# Patient Record
Sex: FEMALE | Race: BLACK OR AFRICAN AMERICAN | NOT HISPANIC OR LATINO | Employment: OTHER | URBAN - METROPOLITAN AREA
[De-identification: names, ages, dates, MRNs, and addresses within clinical notes are randomized per-mention and may not be internally consistent; named-entity substitution may affect disease eponyms.]

---

## 2021-09-28 ENCOUNTER — HOSPITAL ENCOUNTER (EMERGENCY)
Facility: CLINIC | Age: 81
Discharge: HOME | End: 2021-09-28
Attending: EMERGENCY MEDICINE | Admitting: EMERGENCY MEDICINE
Payer: COMMERCIAL

## 2021-09-28 ENCOUNTER — APPOINTMENT (OUTPATIENT)
Dept: CT IMAGING | Facility: CLINIC | Age: 81
End: 2021-09-28
Attending: EMERGENCY MEDICINE
Payer: COMMERCIAL

## 2021-09-28 ENCOUNTER — APPOINTMENT (OUTPATIENT)
Dept: GENERAL RADIOLOGY | Facility: CLINIC | Age: 81
End: 2021-09-28
Attending: EMERGENCY MEDICINE
Payer: COMMERCIAL

## 2021-09-28 VITALS
DIASTOLIC BLOOD PRESSURE: 92 MMHG | OXYGEN SATURATION: 98 % | RESPIRATION RATE: 18 BRPM | HEART RATE: 90 BPM | TEMPERATURE: 97.9 F | SYSTOLIC BLOOD PRESSURE: 120 MMHG

## 2021-09-28 DIAGNOSIS — U07.1 INFECTION DUE TO 2019 NOVEL CORONAVIRUS: ICD-10-CM

## 2021-09-28 LAB
ALBUMIN SERPL-MCNC: 3.4 G/DL (ref 3.4–5)
ALBUMIN UR-MCNC: 100 MG/DL
ALP SERPL-CCNC: 157 U/L (ref 40–150)
ALT SERPL W P-5'-P-CCNC: 13 U/L (ref 0–50)
ANION GAP SERPL CALCULATED.3IONS-SCNC: 9 MMOL/L (ref 3–14)
APPEARANCE UR: ABNORMAL
AST SERPL W P-5'-P-CCNC: 29 U/L (ref 0–45)
BASOPHILS # BLD MANUAL: 0 10E3/UL (ref 0–0.2)
BASOPHILS NFR BLD MANUAL: 0 %
BILIRUB SERPL-MCNC: 1 MG/DL (ref 0.2–1.3)
BILIRUB UR QL STRIP: ABNORMAL
BUN SERPL-MCNC: 42 MG/DL (ref 7–30)
CALCIUM SERPL-MCNC: 9.4 MG/DL (ref 8.5–10.1)
CHLORIDE BLD-SCNC: 106 MMOL/L (ref 94–109)
CO2 SERPL-SCNC: 23 MMOL/L (ref 20–32)
COLOR UR AUTO: YELLOW
CREAT SERPL-MCNC: 1.13 MG/DL (ref 0.52–1.04)
EOSINOPHIL # BLD MANUAL: 0 10E3/UL (ref 0–0.7)
EOSINOPHIL NFR BLD MANUAL: 0 %
ERYTHROCYTE [DISTWIDTH] IN BLOOD BY AUTOMATED COUNT: 15 % (ref 10–15)
GFR SERPL CREATININE-BSD FRML MDRD: 46 ML/MIN/1.73M2
GLUCOSE BLD-MCNC: 104 MG/DL (ref 70–99)
GLUCOSE UR STRIP-MCNC: NEGATIVE MG/DL
HCT VFR BLD AUTO: 51.2 % (ref 35–47)
HGB BLD-MCNC: 16.7 G/DL (ref 11.7–15.7)
HGB UR QL STRIP: NEGATIVE
HYALINE CASTS: 4 /LPF
KETONES UR STRIP-MCNC: 10 MG/DL
LEUKOCYTE ESTERASE UR QL STRIP: ABNORMAL
LIPASE SERPL-CCNC: 139 U/L (ref 73–393)
LYMPHOCYTES # BLD MANUAL: 0.8 10E3/UL (ref 0.8–5.3)
LYMPHOCYTES NFR BLD MANUAL: 18 %
MCH RBC QN AUTO: 29.6 PG (ref 26.5–33)
MCHC RBC AUTO-ENTMCNC: 32.6 G/DL (ref 31.5–36.5)
MCV RBC AUTO: 91 FL (ref 78–100)
MONOCYTES # BLD MANUAL: 0.4 10E3/UL (ref 0–1.3)
MONOCYTES NFR BLD MANUAL: 9 %
MUCOUS THREADS #/AREA URNS LPF: PRESENT /LPF
NEUTROPHILS # BLD MANUAL: 3.3 10E3/UL (ref 1.6–8.3)
NEUTROPHILS NFR BLD MANUAL: 73 %
NITRATE UR QL: NEGATIVE
NRBC # BLD AUTO: 0.1 10E3/UL
NRBC BLD MANUAL-RTO: 2 %
PH UR STRIP: 5.5 [PH] (ref 5–7)
PLAT MORPH BLD: ABNORMAL
PLATELET # BLD AUTO: 343 10E3/UL (ref 150–450)
POTASSIUM BLD-SCNC: 4.3 MMOL/L (ref 3.4–5.3)
PROT SERPL-MCNC: 8.6 G/DL (ref 6.8–8.8)
RBC # BLD AUTO: 5.65 10E6/UL (ref 3.8–5.2)
RBC MORPH BLD: ABNORMAL
RBC URINE: 4 /HPF
SARS-COV-2 RNA RESP QL NAA+PROBE: POSITIVE
SODIUM SERPL-SCNC: 138 MMOL/L (ref 133–144)
SP GR UR STRIP: 1.03 (ref 1–1.03)
SQUAMOUS EPITHELIAL: 3 /HPF
UROBILINOGEN UR STRIP-MCNC: 4 MG/DL
WBC # BLD AUTO: 4.5 10E3/UL (ref 4–11)
WBC URINE: 4 /HPF

## 2021-09-28 PROCEDURE — 82040 ASSAY OF SERUM ALBUMIN: CPT | Performed by: EMERGENCY MEDICINE

## 2021-09-28 PROCEDURE — 81003 URINALYSIS AUTO W/O SCOPE: CPT | Performed by: EMERGENCY MEDICINE

## 2021-09-28 PROCEDURE — 36415 COLL VENOUS BLD VENIPUNCTURE: CPT | Performed by: EMERGENCY MEDICINE

## 2021-09-28 PROCEDURE — 258N000003 HC RX IP 258 OP 636: Performed by: EMERGENCY MEDICINE

## 2021-09-28 PROCEDURE — 250N000011 HC RX IP 250 OP 636: Performed by: EMERGENCY MEDICINE

## 2021-09-28 PROCEDURE — C9803 HOPD COVID-19 SPEC COLLECT: HCPCS | Performed by: EMERGENCY MEDICINE

## 2021-09-28 PROCEDURE — 71046 X-RAY EXAM CHEST 2 VIEWS: CPT

## 2021-09-28 PROCEDURE — U0003 INFECTIOUS AGENT DETECTION BY NUCLEIC ACID (DNA OR RNA); SEVERE ACUTE RESPIRATORY SYNDROME CORONAVIRUS 2 (SARS-COV-2) (CORONAVIRUS DISEASE [COVID-19]), AMPLIFIED PROBE TECHNIQUE, MAKING USE OF HIGH THROUGHPUT TECHNOLOGIES AS DESCRIBED BY CMS-2020-01-R: HCPCS | Performed by: EMERGENCY MEDICINE

## 2021-09-28 PROCEDURE — 83690 ASSAY OF LIPASE: CPT | Performed by: EMERGENCY MEDICINE

## 2021-09-28 PROCEDURE — 96360 HYDRATION IV INFUSION INIT: CPT | Mod: 59 | Performed by: EMERGENCY MEDICINE

## 2021-09-28 PROCEDURE — 93005 ELECTROCARDIOGRAM TRACING: CPT | Performed by: EMERGENCY MEDICINE

## 2021-09-28 PROCEDURE — 99285 EMERGENCY DEPT VISIT HI MDM: CPT | Mod: 25 | Performed by: EMERGENCY MEDICINE

## 2021-09-28 PROCEDURE — 99284 EMERGENCY DEPT VISIT MOD MDM: CPT | Performed by: EMERGENCY MEDICINE

## 2021-09-28 PROCEDURE — 85027 COMPLETE CBC AUTOMATED: CPT | Performed by: EMERGENCY MEDICINE

## 2021-09-28 PROCEDURE — 74177 CT ABD & PELVIS W/CONTRAST: CPT

## 2021-09-28 RX ORDER — IOPAMIDOL 755 MG/ML
100 INJECTION, SOLUTION INTRAVASCULAR ONCE
Status: COMPLETED | OUTPATIENT
Start: 2021-09-28 | End: 2021-09-28

## 2021-09-28 RX ORDER — SODIUM CHLORIDE 9 MG/ML
100 INJECTION, SOLUTION INTRAVENOUS ONCE
Status: COMPLETED | OUTPATIENT
Start: 2021-09-28 | End: 2021-09-28

## 2021-09-28 RX ADMIN — IOPAMIDOL 135 ML: 755 INJECTION, SOLUTION INTRAVENOUS at 19:08

## 2021-09-28 RX ADMIN — SODIUM CHLORIDE 74 ML: 9 INJECTION, SOLUTION INTRAVENOUS at 19:08

## 2021-09-28 RX ADMIN — SODIUM CHLORIDE 1000 ML: 9 INJECTION, SOLUTION INTRAVENOUS at 18:10

## 2021-09-28 NOTE — ED PROVIDER NOTES
ED Provider Note  [facility]      History     Chief Complaint   Patient presents with     Cough     HPI  Cara Cool is a 81 year old female with a history of autoimmune hepatitis, paroxysmal atrial tachycardia, hypertension, hypercholesterolemia, IgM monoclonal gammopathy, TIA,  who presents for evaluation of Cough    Reports she has been feeling unwell for the past week, which started shortly after her niece came to stay with her.  She reports feeling fatigued, decreased oral intake, dry heaving that started last night.  No fevers that she is aware of but she has felt chilled intermittently.  She has a productive cough which she reports is chronic and has not changed in severity, volume of sputum or sputum characteristics.  No chest pain or shortness of breath.  She denies abdominal pain, dysuria, urinary frequency.  She said that she has had decreased stooling but has not had straining or constipation. No lightheadedness, dizziness, syncope/near syncope.    Vaccinated against COVID-19 x2 (last dose was 9/3)    Past Medical and Surgical History, Medications, Allergies, and Social History were reviewed in the electronic medical record. Review with the patient was attempted but limited due to patient unable to recall.       Review of Systems  A complete review of systems was performed with pertinent positives and negatives noted in the HPI, and all other systems negative.    Physical Exam   BP: 107/75  Pulse: 90  Temp: 98.1  F (36.7  C)  Resp: 16  SpO2: 100 %  Physical Exam  Vitals and nursing note reviewed.   Constitutional:       General: She is not in acute distress.  HENT:      Head: Normocephalic and atraumatic.      Right Ear: External ear normal.      Left Ear: External ear normal.   Eyes:      Conjunctiva/sclera: Conjunctivae normal.   Cardiovascular:      Rate and Rhythm: Normal rate and regular rhythm.   Pulmonary:      Effort: Pulmonary effort is normal. No respiratory  distress.      Breath sounds: Normal breath sounds. No stridor. No wheezing, rhonchi or rales.   Abdominal:      General: Abdomen is flat.      Tenderness: There is abdominal tenderness (LLQ ). There is no guarding or rebound.   Musculoskeletal:         General: No deformity.   Skin:     General: Skin is warm and dry.      Capillary Refill: Capillary refill takes less than 2 seconds.   Neurological:      General: No focal deficit present.      Mental Status: She is alert.   Psychiatric:         Mood and Affect: Mood normal.       Diagnostics/Procedures      Results for orders placed or performed during the hospital encounter of 09/28/21   Chest XR,  PA & LAT     Status: None    Narrative    EXAM: XR CHEST 2 VW  LOCATION: [facility]  DATE/TIME: 9/28/2021 7:04 PM    INDICATION: cough  COMPARISON: None.      Impression    IMPRESSION: A small indistinct opacity in the right upper lung may represent a developing pneumonia. Recommend a follow-up chest radiograph after therapy to document resolution.    Lungs hyperinflated. Mild diffuse airway wall thickening. No pleural effusion. The cardiac silhouette and pulmonary vasculature are normal.    NOTE: ABNORMAL REPORT    THE DICTATION ABOVE DESCRIBES AN ABNORMALITY FOR WHICH FOLLOW-UP IS NEEDED.    CT Abdomen Pelvis w Contrast     Status: None    Narrative    EXAM: CT ABDOMEN PELVIS W CONTRAST  LOCATION: [facility]  DATE/TIME: 9/28/2021 6:33 PM    INDICATION: Left lower quadrant pain and fever.  COMPARISON: None.  TECHNIQUE: CT scan of the abdomen and pelvis was performed following injection of IV contrast. Multiplanar reformats were obtained. Dose reduction techniques were used.  CONTRAST: 135 mL Isovue 370    FINDINGS:   LOWER CHEST: Mild fibrotic changes at both lung bases. The visualized lung bases are otherwise clear.    HEPATOBILIARY: Probable small stones or sludge within the  gallbladder. Gallbladder is otherwise unremarkable. No hepatic masses are seen.    PANCREAS: Normal.    SPLEEN: Unremarkable..    ADRENAL GLANDS: Normal.    KIDNEYS/BLADDER: There is mild cortical scarring in the interpolar region of the right kidney anteriorly. No significant mass, stones, or hydronephrosis. There are simple or benign cysts. No follow up is needed.    BOWEL: Scattered colonic diverticulosis. No obstruction or inflammatory change.    LYMPH NODES: No enlarged lymph nodes in the abdomen or pelvis.    VASCULATURE: Moderate atherosclerotic aortoiliac calcification.    PELVIC ORGANS: The uterus is not seen, and is likely surgically absent. Multiple images through the pelvis are degraded by artifact related to a left hip arthroplasty.    MUSCULOSKELETAL: Degenerative changes are noted in the visualized thoracolumbar spine.      Impression    IMPRESSION:   1.  No acute abnormality in the abdomen or pelvis. No cause for left lower quadrant pain is identified.  2.  Colonic diverticulosis, without convincing evidence for diverticulitis.  3.  Small amount of sludge and/or stones within the gallbladder.   Symptomatic COVID-19 Virus (Coronavirus) by PCR Nasopharyngeal     Status: Abnormal    Specimen: Nasopharyngeal; Swab   Result Value Ref Range    SARS CoV2 PCR Positive (A) Negative    Narrative    Testing was performed using the Xpert Xpress SARS-CoV-2 Assay on the  Cepheid Gene-Xpert Instrument Systems. Additional information about  this Emergency Use Authorization (EUA) assay can be found via the Lab  Guide. This test should be ordered for the detection of SARS-CoV-2 in  individuals who meet SARS-CoV-2 clinical and/or epidemiological  criteria. Test performance is unknown in asymptomatic patients. This  test is for in vitro diagnostic use under the FDA EUA for  laboratories certified under CLIA to perform high complexity testing.  This test has not been FDA cleared or approved. A negative result  does not  rule out the presence of PCR inhibitors in the specimen or  target RNA in concentration below the limit of detection for the  assay. The possibility of a false negative should be considered if  the patient's recent exposure or clinical presentation suggests  COVID-19. This test was validated by the [facility]. This laboratory is certified under  the Clinical Laboratory Improvement Amendments of 1988 (CLIA-88) as  qualified to perform high complexity laboratory testing.     Comprehensive metabolic panel     Status: Abnormal   Result Value Ref Range    Sodium 138 133 - 144 mmol/L    Potassium 4.3 3.4 - 5.3 mmol/L    Chloride 106 94 - 109 mmol/L    Carbon Dioxide (CO2) 23 20 - 32 mmol/L    Anion Gap 9 3 - 14 mmol/L    Urea Nitrogen 42 (H) 7 - 30 mg/dL    Creatinine 1.13 (H) 0.52 - 1.04 mg/dL    Calcium 9.4 8.5 - 10.1 mg/dL    Glucose 104 (H) 70 - 99 mg/dL    Alkaline Phosphatase 157 (H) 40 - 150 U/L    AST 29 0 - 45 U/L    ALT 13 0 - 50 U/L    Protein Total 8.6 6.8 - 8.8 g/dL    Albumin 3.4 3.4 - 5.0 g/dL    Bilirubin Total 1.0 0.2 - 1.3 mg/dL    GFR Estimate 46 (L) >60 mL/min/1.73m2   Lipase     Status: Normal   Result Value Ref Range    Lipase 139 73 - 393 U/L   UA with Microscopic reflex to Culture     Status: Abnormal    Specimen: Urine, NOS   Result Value Ref Range    Color Urine Yellow Colorless, Straw, Light Yellow, Yellow    Appearance Urine Slightly Cloudy (A) Clear    Glucose Urine Negative Negative mg/dL    Bilirubin Urine Small (A) Negative    Ketones Urine 10  (A) Negative mg/dL    Specific Gravity Urine 1.030 1.003 - 1.035    Blood Urine Negative Negative    pH Urine 5.5 5.0 - 7.0    Protein Albumin Urine 100  (A) Negative mg/dL    Urobilinogen Urine 4.0 (A) Normal, 2.0 mg/dL    Nitrite Urine Negative Negative    Leukocyte Esterase Urine Small (A) Negative    Mucus Urine Present (A) None Seen /LPF    RBC Urine 4 (H) <=2 /HPF    WBC Urine 4 <=5 /HPF     Squamous Epithelials Urine 3 (H) <=1 /HPF    Hyaline Casts Urine 4 (H) <=2 /LPF    Narrative    Urine Culture not indicated   CBC with platelets and differential     Status: Abnormal   Result Value Ref Range    WBC Count 4.5 4.0 - 11.0 10e3/uL    RBC Count 5.65 (H) 3.80 - 5.20 10e6/uL    Hemoglobin 16.7 (H) 11.7 - 15.7 g/dL    Hematocrit 51.2 (H) 35.0 - 47.0 %    MCV 91 78 - 100 fL    MCH 29.6 26.5 - 33.0 pg    MCHC 32.6 31.5 - 36.5 g/dL    RDW 15.0 10.0 - 15.0 %    Platelet Count 343 150 - 450 10e3/uL   Manual Differential     Status: Abnormal   Result Value Ref Range    % Neutrophils 73 %    % Lymphocytes 18 %    % Monocytes 9 %    % Eosinophils 0 %    % Basophils 0 %    NRBCs per 100 WBC 2 (H) <=0 %    Absolute Neutrophils 3.3 1.6 - 8.3 10e3/uL    Absolute Lymphocytes 0.8 0.8 - 5.3 10e3/uL    Absolute Monocytes 0.4 0.0 - 1.3 10e3/uL    Absolute Eosinophils 0.0 0.0 - 0.7 10e3/uL    Absolute Basophils 0.0 0.0 - 0.2 10e3/uL    Absolute NRBCs 0.1 (H) <=0.0 10e3/uL    RBC Morphology Confirmed RBC Indices     Platelet Assessment  Automated Count Confirmed. Platelet morphology is normal.     Automated Count Confirmed. Platelet morphology is normal.   EKG 12 lead     Status: None (Preliminary result)   Result Value Ref Range    Systolic Blood Pressure  mmHg    Diastolic Blood Pressure  mmHg    Ventricular Rate 86 BPM    Atrial Rate 86 BPM    VA Interval 136 ms    QRS Duration 66 ms     ms    QTc 459 ms    P Axis -5 degrees    R AXIS 12 degrees    T Axis 70 degrees    Interpretation ECG       Sinus rhythm  Nonspecific T wave abnormality  Abnormal ECG     CBC with platelets differential     Status: Abnormal    Narrative    The following orders were created for panel order CBC with platelets differential.  Procedure                               Abnormality         Status                     ---------                               -----------         ------                     CBC with platelets and d...[[id number]]   Abnormal            Final result               Manual Differential[[id number]]          Abnormal            Final result                 Please view results for these tests on the individual orders.     Medications   0.9% sodium chloride BOLUS (0 mLs Intravenous Stopped 9/28/21 1900)   sodium chloride 0.9% infusion (0 mLs Intravenous Stopped 9/28/21 1920)   iopamidol (ISOVUE-370) solution 100 mL (135 mLs Intravenous Given 9/28/21 1908)        Assessments & Plan (with Medical Decision Making)   Cara Cool is a 81 year old female presenting with generalized malaise, and poor PO intake x1 week. Vital signs are within normal limits. Patient's chief complaint is listed as cough; however, she reports this is a chronic productive cough that has not change in quality, frequency, severity or sputum characteristics.     Differential includes but is not limited to COVID-19 infection, electrolyte abnormality, bowel obstruction, diverticulitis among others    Plan - labs, ECG, CXR and CT imaging of the abdomen/pelvis. Disposition pending ED course.     I have reviewed the nursing notes. I have reviewed the findings, diagnosis, plan and need for follow up with the patient.    ED Course as of Sep 28 2204   Tue Sep 28, 2021   1734 Personally reviewed and interpreted ECG. Sinus rhythm with ventricular rate of 86bpm. Normal intervals. T wave inversions in V1, V2, T wave flattening diffusely. No other acute ischemic findings. No prior ECGs available for comparison.       1823 May be hemoconcentrated   CBC with platelets differential(!)   1832 Comprehensive metabolic panel(!)   1832 Lipase   2001 Symptomatic COVID-19 Virus (Coronavirus) by PCR Nasopharyngeal(!)   2001 1.  No acute abnormality in the abdomen or pelvis. No cause for left lower quadrant pain is identified.  2.  Colonic diverticulosis, without convincing evidence for diverticulitis.  3.  Small amount of sludge and/or stones within the gallbladder   CT Abdomen Pelvis w  Contrast   2036 A small indistinct opacity in the right upper lung may represent a developing pneumonia. Recommend a follow-up chest radiograph after therapy to document resolution.     Lungs hyperinflated. Mild diffuse airway wall thickening. No pleural effusion. The cardiac silhouette and pulmonary vasculature are normal.   Chest XR,  PA & LAT   2036 Indistinct opacity in right upper lung. Considered possible consolidative bacterial pneumonia but more likely secondary to active COVID-19 infection       2144 Small leukocyte esterase with minimal WBCs and squamous epithelial cells present. Likely contaminated.    UA with Microscopic reflex to Culture(!)   2156 Results reviewed with the patient. Patient feels comfortable with plan to discharge home. Discussed discharge instructions, return precautions and follow-up plans. Questions answered. Patient expressed understanding and agreement with the plan.  Patient was discharged home.            New Prescriptions    No medications on file       Final diagnoses:   Infection due to 2019 novel coronavirus          Maite Hill MD  09/28/21 [id number]

## 2021-09-28 NOTE — ED TRIAGE NOTES
Pt states her family member who live with her is sick and she is now having a cough and wants to be checked for COVID.

## 2021-09-29 LAB
ATRIAL RATE - MUSE: 86 BPM
DIASTOLIC BLOOD PRESSURE - MUSE: NORMAL MMHG
INTERPRETATION ECG - MUSE: NORMAL
P AXIS - MUSE: -5 DEGREES
PR INTERVAL - MUSE: 136 MS
QRS DURATION - MUSE: 66 MS
QT - MUSE: 384 MS
QTC - MUSE: 459 MS
R AXIS - MUSE: 12 DEGREES
SYSTOLIC BLOOD PRESSURE - MUSE: NORMAL MMHG
T AXIS - MUSE: 70 DEGREES
VENTRICULAR RATE- MUSE: 86 BPM

## 2021-09-29 NOTE — DISCHARGE INSTRUCTIONS
You were diagnosed with COVID-19 infection today. Stay home and self isolate. Drink plenty of fluids. All household/close contacts need to be tested for COVID. Return to the ED if you become lightheaded/dizzy, have chest pain or shortness of breath, or concerned for dehydration.

## 2024-07-17 ENCOUNTER — APPOINTMENT (OUTPATIENT)
Dept: CT IMAGING | Facility: CLINIC | Age: 84
DRG: 543 | End: 2024-07-17
Payer: COMMERCIAL

## 2024-07-17 ENCOUNTER — HOSPITAL ENCOUNTER (INPATIENT)
Facility: CLINIC | Age: 84
LOS: 7 days | Discharge: HOME HEALTH | DRG: 543 | End: 2024-07-25
Attending: INTERNAL MEDICINE | Admitting: FAMILY MEDICINE
Payer: COMMERCIAL

## 2024-07-17 DIAGNOSIS — N30.00 ACUTE CYSTITIS WITHOUT HEMATURIA: ICD-10-CM

## 2024-07-17 DIAGNOSIS — I47.19 ATRIAL TACHYCARDIA (H): Primary | ICD-10-CM

## 2024-07-17 DIAGNOSIS — I49.8 JUNCTIONAL RHYTHM: ICD-10-CM

## 2024-07-17 DIAGNOSIS — Z86.711 HX OF PULMONARY EMBOLUS: ICD-10-CM

## 2024-07-17 DIAGNOSIS — M54.9 BACK PAIN, UNSPECIFIED BACK LOCATION, UNSPECIFIED BACK PAIN LATERALITY, UNSPECIFIED CHRONICITY: ICD-10-CM

## 2024-07-17 DIAGNOSIS — S22.089A CLOSED FRACTURE OF ELEVENTH THORACIC VERTEBRA, UNSPECIFIED FRACTURE MORPHOLOGY, INITIAL ENCOUNTER (H): ICD-10-CM

## 2024-07-17 DIAGNOSIS — I48.0 PAROXYSMAL ATRIAL FIBRILLATION (H): ICD-10-CM

## 2024-07-17 DIAGNOSIS — M89.9 LYTIC BONE LESIONS ON XRAY: ICD-10-CM

## 2024-07-17 DIAGNOSIS — Z86.79 HISTORY OF PAROXYSMAL ATRIAL TACHYCARDIA: Chronic | ICD-10-CM

## 2024-07-17 PROBLEM — R57.9 SHOCK (H): Status: ACTIVE | Noted: 2021-10-08

## 2024-07-17 PROBLEM — M81.0 OSTEOPOROSIS: Status: ACTIVE | Noted: 2017-08-23

## 2024-07-17 PROBLEM — E21.3 HYPERPARATHYROIDISM (H): Status: ACTIVE | Noted: 2019-11-12

## 2024-07-17 PROBLEM — I26.02 ACUTE SADDLE PULMONARY EMBOLISM WITH ACUTE COR PULMONALE (H): Status: ACTIVE | Noted: 2021-10-08

## 2024-07-17 PROBLEM — K75.4 HEPATITIS, AUTOIMMUNE (H): Status: ACTIVE | Noted: 2017-11-13

## 2024-07-17 PROBLEM — E66.01 OBESITY, CLASS III, BMI 40-49.9 (MORBID OBESITY) (H): Status: ACTIVE | Noted: 2021-05-22

## 2024-07-17 PROBLEM — M89.8X9 LYTIC BONE LESIONS ON XRAY: Status: ACTIVE | Noted: 2024-07-17

## 2024-07-17 PROBLEM — N28.89 RENAL MASS: Status: ACTIVE | Noted: 2018-05-08

## 2024-07-17 PROBLEM — I47.10 SUPRAVENTRICULAR TACHYCARDIA (H): Status: ACTIVE | Noted: 2021-10-29

## 2024-07-17 PROBLEM — D47.2 IGM MONOCLONAL GAMMOPATHY OF UNCERTAIN SIGNIFICANCE: Status: ACTIVE | Noted: 2017-12-22

## 2024-07-17 PROBLEM — E66.813 OBESITY, CLASS III, BMI 40-49.9 (MORBID OBESITY) (H): Status: ACTIVE | Noted: 2021-05-22

## 2024-07-17 LAB
ALBUMIN SERPL BCG-MCNC: 4 G/DL (ref 3.5–5.2)
ALP SERPL-CCNC: 204 U/L (ref 40–150)
ALT SERPL W P-5'-P-CCNC: 6 U/L (ref 0–50)
ANION GAP SERPL CALCULATED.3IONS-SCNC: 16 MMOL/L (ref 7–15)
APTT PPP: 39 SECONDS (ref 22–38)
AST SERPL W P-5'-P-CCNC: 25 U/L (ref 0–45)
BASOPHILS # BLD AUTO: 0 10E3/UL (ref 0–0.2)
BASOPHILS NFR BLD AUTO: 0 %
BILIRUB SERPL-MCNC: 0.8 MG/DL
BUN SERPL-MCNC: 29.2 MG/DL (ref 8–23)
CALCIUM SERPL-MCNC: 10.5 MG/DL (ref 8.8–10.4)
CHLORIDE SERPL-SCNC: 98 MMOL/L (ref 98–107)
CREAT SERPL-MCNC: 0.86 MG/DL (ref 0.51–0.95)
EGFRCR SERPLBLD CKD-EPI 2021: 67 ML/MIN/1.73M2
EOSINOPHIL # BLD AUTO: 0 10E3/UL (ref 0–0.7)
EOSINOPHIL NFR BLD AUTO: 0 %
ERYTHROCYTE [DISTWIDTH] IN BLOOD BY AUTOMATED COUNT: 14.9 % (ref 10–15)
GLUCOSE SERPL-MCNC: 103 MG/DL (ref 70–99)
HCO3 SERPL-SCNC: 24 MMOL/L (ref 22–29)
HCT VFR BLD AUTO: 43.2 % (ref 35–47)
HGB BLD-MCNC: 14.4 G/DL (ref 11.7–15.7)
IMM GRANULOCYTES # BLD: 0 10E3/UL
IMM GRANULOCYTES NFR BLD: 1 %
INR PPP: 2.17 (ref 0.85–1.15)
LYMPHOCYTES # BLD AUTO: 0.9 10E3/UL (ref 0.8–5.3)
LYMPHOCYTES NFR BLD AUTO: 12 %
MAGNESIUM SERPL-MCNC: 1.7 MG/DL (ref 1.7–2.3)
MCH RBC QN AUTO: 29.9 PG (ref 26.5–33)
MCHC RBC AUTO-ENTMCNC: 33.3 G/DL (ref 31.5–36.5)
MCV RBC AUTO: 90 FL (ref 78–100)
MONOCYTES # BLD AUTO: 0.9 10E3/UL (ref 0–1.3)
MONOCYTES NFR BLD AUTO: 11 %
NEUTROPHILS # BLD AUTO: 5.7 10E3/UL (ref 1.6–8.3)
NEUTROPHILS NFR BLD AUTO: 76 %
NRBC # BLD AUTO: 0 10E3/UL
NRBC BLD AUTO-RTO: 0 /100
PLATELET # BLD AUTO: 434 10E3/UL (ref 150–450)
POTASSIUM SERPL-SCNC: 4.2 MMOL/L (ref 3.4–5.3)
PROT SERPL-MCNC: 8.4 G/DL (ref 6.4–8.3)
RBC # BLD AUTO: 4.82 10E6/UL (ref 3.8–5.2)
SODIUM SERPL-SCNC: 138 MMOL/L (ref 135–145)
TROPONIN T SERPL HS-MCNC: 26 NG/L
TROPONIN T SERPL HS-MCNC: 27 NG/L
WBC # BLD AUTO: 7.6 10E3/UL (ref 4–11)

## 2024-07-17 PROCEDURE — 83735 ASSAY OF MAGNESIUM: CPT

## 2024-07-17 PROCEDURE — 85025 COMPLETE CBC W/AUTO DIFF WBC: CPT

## 2024-07-17 PROCEDURE — 85610 PROTHROMBIN TIME: CPT

## 2024-07-17 PROCEDURE — 99285 EMERGENCY DEPT VISIT HI MDM: CPT | Mod: 25 | Performed by: INTERNAL MEDICINE

## 2024-07-17 PROCEDURE — 250N000011 HC RX IP 250 OP 636

## 2024-07-17 PROCEDURE — 85730 THROMBOPLASTIN TIME PARTIAL: CPT

## 2024-07-17 PROCEDURE — 96374 THER/PROPH/DIAG INJ IV PUSH: CPT

## 2024-07-17 PROCEDURE — 71260 CT THORAX DX C+: CPT

## 2024-07-17 PROCEDURE — 999N000104 CT THORACIC SPINE RECONSTRUCTED

## 2024-07-17 PROCEDURE — 99285 EMERGENCY DEPT VISIT HI MDM: CPT | Performed by: INTERNAL MEDICINE

## 2024-07-17 PROCEDURE — 80053 COMPREHEN METABOLIC PANEL: CPT

## 2024-07-17 PROCEDURE — 96361 HYDRATE IV INFUSION ADD-ON: CPT | Performed by: INTERNAL MEDICINE

## 2024-07-17 PROCEDURE — 250N000011 HC RX IP 250 OP 636: Performed by: INTERNAL MEDICINE

## 2024-07-17 PROCEDURE — 93010 ELECTROCARDIOGRAM REPORT: CPT

## 2024-07-17 PROCEDURE — 36415 COLL VENOUS BLD VENIPUNCTURE: CPT

## 2024-07-17 PROCEDURE — 82565 ASSAY OF CREATININE: CPT | Performed by: STUDENT IN AN ORGANIZED HEALTH CARE EDUCATION/TRAINING PROGRAM

## 2024-07-17 PROCEDURE — 96360 HYDRATION IV INFUSION INIT: CPT | Mod: 59 | Performed by: INTERNAL MEDICINE

## 2024-07-17 PROCEDURE — G0378 HOSPITAL OBSERVATION PER HR: HCPCS

## 2024-07-17 PROCEDURE — 93005 ELECTROCARDIOGRAM TRACING: CPT

## 2024-07-17 PROCEDURE — 258N000003 HC RX IP 258 OP 636

## 2024-07-17 PROCEDURE — 250N000009 HC RX 250: Performed by: INTERNAL MEDICINE

## 2024-07-17 PROCEDURE — 84484 ASSAY OF TROPONIN QUANT: CPT

## 2024-07-17 PROCEDURE — 250N000013 HC RX MED GY IP 250 OP 250 PS 637

## 2024-07-17 RX ORDER — HYDROCHLOROTHIAZIDE 50 MG/1
50 TABLET ORAL DAILY
COMMUNITY
Start: 2023-12-22

## 2024-07-17 RX ORDER — LIDOCAINE 40 MG/G
CREAM TOPICAL
Status: DISCONTINUED | OUTPATIENT
Start: 2024-07-17 | End: 2024-07-25 | Stop reason: HOSPADM

## 2024-07-17 RX ORDER — SPIRONOLACTONE 50 MG/1
50 TABLET, FILM COATED ORAL DAILY
Status: ON HOLD | COMMUNITY
Start: 2023-12-22 | End: 2024-07-18

## 2024-07-17 RX ORDER — ATORVASTATIN CALCIUM 20 MG/1
20 TABLET, FILM COATED ORAL DAILY
COMMUNITY
Start: 2023-12-22

## 2024-07-17 RX ORDER — OXYCODONE HYDROCHLORIDE 5 MG/1
5 TABLET ORAL EVERY 4 HOURS PRN
Status: DISCONTINUED | OUTPATIENT
Start: 2024-07-17 | End: 2024-07-18

## 2024-07-17 RX ORDER — OXYCODONE HYDROCHLORIDE 5 MG/1
5 TABLET ORAL ONCE
Status: COMPLETED | OUTPATIENT
Start: 2024-07-17 | End: 2024-07-17

## 2024-07-17 RX ORDER — DIGOXIN 125 MCG
125 TABLET ORAL DAILY
COMMUNITY
Start: 2023-12-22

## 2024-07-17 RX ORDER — AMOXICILLIN 250 MG
2 CAPSULE ORAL 2 TIMES DAILY PRN
Status: DISCONTINUED | OUTPATIENT
Start: 2024-07-17 | End: 2024-07-25 | Stop reason: HOSPADM

## 2024-07-17 RX ORDER — VITAMIN B COMPLEX
1000 TABLET ORAL DAILY
COMMUNITY

## 2024-07-17 RX ORDER — POLYETHYLENE GLYCOL 3350 17 G/17G
17 POWDER, FOR SOLUTION ORAL DAILY
Status: DISCONTINUED | OUTPATIENT
Start: 2024-07-18 | End: 2024-07-25 | Stop reason: HOSPADM

## 2024-07-17 RX ORDER — IOPAMIDOL 755 MG/ML
100 INJECTION, SOLUTION INTRAVASCULAR ONCE
Status: COMPLETED | OUTPATIENT
Start: 2024-07-17 | End: 2024-07-17

## 2024-07-17 RX ORDER — IOPAMIDOL 755 MG/ML
100 INJECTION, SOLUTION INTRAVASCULAR ONCE
Status: DISCONTINUED | OUTPATIENT
Start: 2024-07-17 | End: 2024-07-18

## 2024-07-17 RX ORDER — CALCIUM CARBONATE 500 MG/1
1000 TABLET, CHEWABLE ORAL 4 TIMES DAILY PRN
Status: DISCONTINUED | OUTPATIENT
Start: 2024-07-17 | End: 2024-07-25 | Stop reason: HOSPADM

## 2024-07-17 RX ORDER — ALENDRONATE SODIUM 70 MG/1
70 TABLET ORAL
COMMUNITY
Start: 2023-12-22

## 2024-07-17 RX ORDER — SPIRONOLACTONE 50 MG/1
50 TABLET, FILM COATED ORAL DAILY
COMMUNITY
Start: 2024-05-02

## 2024-07-17 RX ORDER — AMOXICILLIN 250 MG
1 CAPSULE ORAL 2 TIMES DAILY PRN
Status: DISCONTINUED | OUTPATIENT
Start: 2024-07-17 | End: 2024-07-25 | Stop reason: HOSPADM

## 2024-07-17 RX ORDER — ENOXAPARIN SODIUM 100 MG/ML
40 INJECTION SUBCUTANEOUS EVERY 24 HOURS
Status: DISCONTINUED | OUTPATIENT
Start: 2024-07-18 | End: 2024-07-18

## 2024-07-17 RX ORDER — HYDROMORPHONE HYDROCHLORIDE 1 MG/ML
0.5 INJECTION, SOLUTION INTRAMUSCULAR; INTRAVENOUS; SUBCUTANEOUS ONCE
Status: COMPLETED | OUTPATIENT
Start: 2024-07-17 | End: 2024-07-17

## 2024-07-17 RX ORDER — AZATHIOPRINE 50 MG/1
75 TABLET ORAL DAILY
COMMUNITY
Start: 2024-05-02

## 2024-07-17 RX ADMIN — SODIUM CHLORIDE 90 ML: 9 INJECTION, SOLUTION INTRAVENOUS at 20:20

## 2024-07-17 RX ADMIN — HYDROMORPHONE HYDROCHLORIDE 0.5 MG: 1 INJECTION, SOLUTION INTRAMUSCULAR; INTRAVENOUS; SUBCUTANEOUS at 22:25

## 2024-07-17 RX ADMIN — SODIUM CHLORIDE 1000 ML: 9 INJECTION, SOLUTION INTRAVENOUS at 18:51

## 2024-07-17 RX ADMIN — OXYCODONE HYDROCHLORIDE 5 MG: 5 TABLET ORAL at 17:38

## 2024-07-17 RX ADMIN — IOPAMIDOL 90 ML: 755 INJECTION, SOLUTION INTRAVENOUS at 20:20

## 2024-07-17 ASSESSMENT — ACTIVITIES OF DAILY LIVING (ADL)
ADLS_ACUITY_SCORE: 35
ADLS_ACUITY_SCORE: 33
ADLS_ACUITY_SCORE: 35
ADLS_ACUITY_SCORE: 35

## 2024-07-17 ASSESSMENT — COLUMBIA-SUICIDE SEVERITY RATING SCALE - C-SSRS
6. HAVE YOU EVER DONE ANYTHING, STARTED TO DO ANYTHING, OR PREPARED TO DO ANYTHING TO END YOUR LIFE?: NO
1. IN THE PAST MONTH, HAVE YOU WISHED YOU WERE DEAD OR WISHED YOU COULD GO TO SLEEP AND NOT WAKE UP?: NO
2. HAVE YOU ACTUALLY HAD ANY THOUGHTS OF KILLING YOURSELF IN THE PAST MONTH?: NO

## 2024-07-17 NOTE — ED TRIAGE NOTES
Triage Assessment (Adult)       Row Name 07/17/24 5261          Triage Assessment    Airway WDL WDL        Respiratory WDL    Respiratory WDL WDL        Skin Circulation/Temperature WDL    Skin Circulation/Temperature WDL WDL        Cardiac WDL    Cardiac WDL WDL

## 2024-07-17 NOTE — ED PROVIDER NOTES
"ED Provider Note  [facility]      History     Chief Complaint   Patient presents with    Back Pain     States that it started out like gas on her left side, flank area, then it moved to her right shoulder blade and now it is back down to her left side; has been taking \"gas pills.\" Denies nausea or vomitting; last BM was 2-3 days ago, was normal for her. Has decreased appetite.     The history is provided by the patient and medical records. No  was used.     Cara Cool is a 84 year old female who presents to the ED with complaint of back pain. Past medical history notable for saddle PE with acute cor pulmonale, atrial tachycardia, paroxysmal atrial fibrillation, cholelithiasis, hypercholesterolemia, hypertension, hyperparathyroidism, obesity, osteoarthritis, TIA, tobacco use.  She presents with complaints of worsening, nontraumatic back pain.  She states that her pain began about 1 week ago and has been progressively worsening in severity prompting her arrival today for further evaluation.  She denies trauma, new heavy lifting or exacerbating activities that could have prompted or exacerbated her back pain 1 week ago.  She locates her pain to her mid central back with radiation bilaterally.  She denies history of back injuries or back pain at this severity.  She denies any radiation of the pain into her abdomen.  She denies any symptoms of sweating, nausea, vomiting, changes in her bowel movements, or urinary symptoms.  She denies on several occasions, symptoms of chest pain, palpitations, shortness of air, pain radiating into her shoulders, neck, jaw, or upper extremities.  She denies headache, vision changes or loss of vision.  She denies any numbness, tingling, or weakness into her upper or lower extremities.  She denies any overflow incontinence of bladder or bowel or numbness in her groin area.    Past Medical History  Past Medical History:   Diagnosis Date    " Arthritis     Hypertension      Past Surgical History:   Procedure Laterality Date    ORTHOPEDIC SURGERY      hip and knee replacement surgery     No current outpatient medications on file.    No Known Allergies  Family History  History reviewed. No pertinent family history.  Social History   Social History     Tobacco Use    Smoking status: Never    Smokeless tobacco: Never   Substance Use Topics    Alcohol use: Not Currently    Drug use: Never      Past medical history, past surgical history, medications, allergies, family history, and social history were reviewed with the patient. No additional pertinent items.     A medically appropriate review of systems was performed with pertinent positives and negatives noted in the HPI, and all other systems negative.    Physical Exam   BP: 111/75  Pulse: 65  Temp: 97.9  F (36.6  C)  Resp: 20  Weight: 105.8 kg (233 lb 3.2 oz)  SpO2: 97 %  Physical Exam  Constitutional:       General: She is not in acute distress.     Appearance: Normal appearance. She is obese. She is not ill-appearing, toxic-appearing or diaphoretic.   HENT:      Mouth/Throat:      Mouth: Mucous membranes are dry.      Pharynx: Oropharynx is clear.   Cardiovascular:      Rate and Rhythm: Regular rhythm. Tachycardia present.      Pulses: Normal pulses.   Pulmonary:      Effort: Pulmonary effort is normal. No respiratory distress.      Breath sounds: Normal breath sounds. No stridor. No wheezing or rales.   Abdominal:      General: Abdomen is flat. Bowel sounds are normal. There is no distension.      Palpations: Abdomen is soft.      Tenderness: There is no abdominal tenderness. There is no guarding or rebound.   Musculoskeletal:      Cervical back: Normal range of motion.      Thoracic back: Tenderness and bony tenderness present. No spasms.      Lumbar back: Normal. No tenderness or bony tenderness.        Back:    Neurological:      General: No focal deficit present.      Mental Status: She is alert and  oriented to person, place, and time. Mental status is at baseline.   Psychiatric:         Mood and Affect: Mood normal.         Behavior: Behavior normal.           ED Course, Procedures, & Data     ED Course as of 07/17/24 2208 Wed Jul 17, 2024   1854 Troponin T, High Sensitivity(!): 27   1855 Anion Gap(!): 16   1947 Troponin T, High Sensitivity(!): 26  Not drawn at 2 hour margaret   2013 INR(!): 2.17     Procedures            EKG Interpretation:      Interpreted by Lizet Rendon PA-C  Time reviewed: 1819  Symptoms at time of EKG: Back pain   Rhythm: junctional and sinus tachycardia  Rate: Tachycardia and 120-130  Axis: Normal  Ectopy: premature junctional contraction  Conduction: normal  ST Segments/ T Waves: Non-specific ST-T wave changes  Q Waves: none  Comparison to prior: New EKG findings from previous on 9/28/2021    Clinical Impression: non-specific EKG, junctional rhythm, and sinus tachycardia         Results for orders placed or performed during the hospital encounter of 07/17/24   CT Thoracic Spine Reconstructed     Status: None    Narrative    EXAM: CT THORACIC SPINE RECONSTRUCTED  LOCATION: [facility]  DATE: 7/17/2024    INDICATION: central lower thoracic back pain, reproducible; non traumatic, reproducible  COMPARISON: None.  TECHNIQUE: Routine CT Thoracic Spine without IV contrast. Multiplanar reformats. Dose reduction techniques were used.     FINDINGS:  VERTEBRA: Fracture of T11 with slight vertebral body height loss. There is suggestion of underlying lesion in the left pedicle extending into vertebral bodies and posterior elements as well as into adjacent soft tissues. MRI thoracic spine without and   with contrast recommended; providing patient is MRI compatible. Mild wedging of a few mid thoracic vertebral bodies with mildly exaggerated thoracic kyphosis. Upper left and lower right thoracic curve.    CANAL/FORAMINA: Mild to moderate degenerative  changes without significant canal narrowing at any level. Multilevel mild to moderate neural foraminal narrowing.    PARASPINAL: Probable 22 mm nodule posterior aspect left thyroid gland; not well-visualized ultrasound recommended, if not done previously.      Impression    IMPRESSION:  1.  Fracture of T11 with slight vertebral body height loss. There is suggestion of underlying lesion in the left pedicle extending into vertebral bodies and posterior elements as well as into adjacent soft tissues.   2.  MRI thoracic spine without and with contrast recommended; providing patient is MRI compatible.  3.  No definite other fractures.     CT Aortic Survey w Contrast     Status: None    Narrative    EXAM: CT AORTIC SURVEY W CONTRAST  LOCATION: [facility]  DATE: 7/17/2024    INDICATION: back pain worsening x1 week; hx saddle PE, tobacco use  COMPARISON: No prior chest imaging; CT of the abdomen and pelvis with contrast 9/28/2021  TECHNIQUE: CT angiogram chest abdomen pelvis during arterial phase of injection of IV contrast. 2D and 3D MIP reconstructions were performed by the CT technologist. Dose reduction techniques were used.   CONTRAST: 90mL Isovue 370    FINDINGS:   CT ANGIOGRAM CHEST, ABDOMEN, AND PELVIS: The main pulmonary artery is normal caliber. There are no pulmonary artery filling defects. The pulmonary arteries taper normally as they extend towards the pleura. Minimal calcification of the aortic root.   Sinotubular junction is preserved. No thoracic aortic aneurysm. 2 vessel arch anatomy. Mixed attenuation plaque is present in the arch and descending aorta. No acute aortic syndrome.    Mixed attenuation plaque is present in the normal caliber abdominal aorta and in the common iliac arteries. The and celiac axis, SMA, and ANNETTE are patent. No stenoses of the origins or proximal segments of the renal arteries.    LUNGS AND PLEURA: Upper lung predominant emphysema. There  is a circumscribed, polygonal nodule along the lateral costal pleura of the right upper lobe measuring 5-6 mm (series 4, image 98) which has typical features of an intrapulmonary lymph node. No   other lung nodules. Trachea and central airways are patent. No bronchial wall thickening or bronchiectasis. No pleural effusions.    MEDIASTINUM: Cardiac chambers are normal in size. No pericardial effusion. No enlarged mediastinal or hilar lymph nodes. The esophagus is decompressed. Heterogeneous thyroid gland, but no actionable thyroid nodule.    CORONARY ARTERY CALCIFICATION: Severe.    HEPATOBILIARY: Slightly lobulated liver contour. Small calcified stones layer into the fundus of the gallbladder. No gallbladder wall thickening or pericholecystic inflammation. No bile duct enlargement.    PANCREAS: Normal.    SPLEEN: Spleen is normal in size. There are several embolic coils along the course of the splenic artery in the left upper quadrant.    ADRENAL GLANDS: Normal.    KIDNEYS/BLADDER: No significant mass, stones, or hydronephrosis. There are simple or benign cysts. No follow up is needed.    BOWEL: Mild diverticulosis of the colon in the left lower quadrant. No dilated bowel or bowel wall thickening.    LYMPH NODES: There are no enlarged lymph nodes in the abdomen or pelvis.    PELVIC ORGANS: Status post hysterectomy. No pelvic mass.    MUSCULOSKELETAL: Generalized bone demineralization. There is a lytic lesion related to the left transverse process of T12 (series 4, image 225). Status post left hip arthroplasty. No periprosthetic fractures. Fibrofatty thickening deep to the latissimus   muscles bilaterally consistent with mild elastofibroma dorsi. Small fat-containing umbilical hernia. Previous abdominoplasty.      Impression    IMPRESSION:    1.  No acute pulmonary embolism or acute aortic syndrome.  2.  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  3.  Diverticulosis of the distal colon but no diverticulitis.  4.  Cholelithiasis.     Comprehensive metabolic panel     Status: Abnormal   Result Value Ref Range    Sodium 138 135 - 145 mmol/L    Potassium 4.2 3.4 - 5.3 mmol/L    Carbon Dioxide (CO2) 24 22 - 29 mmol/L    Anion Gap 16 (H) 7 - 15 mmol/L    Urea Nitrogen 29.2 (H) 8.0 - 23.0 mg/dL    Creatinine 0.86 0.51 - 0.95 mg/dL    GFR Estimate 67 >60 mL/min/1.73m2    Calcium 10.5 (H) 8.8 - 10.4 mg/dL    Chloride 98 98 - 107 mmol/L    Glucose 103 (H) 70 - 99 mg/dL    Alkaline Phosphatase 204 (H) 40 - 150 U/L    AST 25 0 - 45 U/L    ALT 6 0 - 50 U/L    Protein Total 8.4 (H) 6.4 - 8.3 g/dL    Albumin 4.0 3.5 - 5.2 g/dL    Bilirubin Total 0.8 <=1.2 mg/dL   Troponin T, High Sensitivity     Status: Abnormal   Result Value Ref Range    Troponin T, High Sensitivity 27 (H) <=14 ng/L   Magnesium     Status: Normal   Result Value Ref Range    Magnesium 1.7 1.7 - 2.3 mg/dL   CBC with platelets and differential     Status: None   Result Value Ref Range    WBC Count 7.6 4.0 - 11.0 10e3/uL    RBC Count 4.82 3.80 - 5.20 10e6/uL    Hemoglobin 14.4 11.7 - 15.7 g/dL    Hematocrit 43.2 35.0 - 47.0 %    MCV 90 78 - 100 fL    MCH 29.9 26.5 - 33.0 pg    MCHC 33.3 31.5 - 36.5 g/dL    RDW 14.9 10.0 - 15.0 %    Platelet Count 434 150 - 450 10e3/uL    % Neutrophils 76 %    % Lymphocytes 12 %    % Monocytes 11 %    % Eosinophils 0 %    % Basophils 0 %    % Immature Granulocytes 1 %    NRBCs per 100 WBC 0 <1 /100    Absolute Neutrophils 5.7 1.6 - 8.3 10e3/uL    Absolute Lymphocytes 0.9 0.8 - 5.3 10e3/uL    Absolute Monocytes 0.9 0.0 - 1.3 10e3/uL    Absolute Eosinophils 0.0 0.0 - 0.7 10e3/uL    Absolute Basophils 0.0 0.0 - 0.2 10e3/uL    Absolute Immature Granulocytes 0.0 <=0.4 10e3/uL    Absolute NRBCs 0.0 10e3/uL   Troponin T, High Sensitivity     Status: Abnormal   Result Value Ref Range    Troponin T, High Sensitivity 26 (H) <=14 ng/L   INR      Status: Abnormal   Result Value Ref Range    INR 2.17 (H) 0.85 - 1.15   Partial thromboplastin time     Status: Abnormal   Result Value Ref Range    aPTT 39 (H) 22 - 38 Seconds   EKG 12-lead, tracing only     Status: None (Preliminary result)   Result Value Ref Range    Systolic Blood Pressure  mmHg    Diastolic Blood Pressure  mmHg    Ventricular Rate 129 BPM    Atrial Rate 133 BPM    OK Interval  ms    QRS Duration 110 ms     ms    QTc 457 ms    P Axis  degrees    R AXIS 2 degrees    T Axis 142 degrees    Interpretation ECG       Accelerated Junctional rhythm with retrograde conduction  Nonspecific ST and T wave abnormality  Abnormal ECG     CBC with platelets differential     Status: None    Narrative    The following orders were created for panel order CBC with platelets differential.  Procedure                               Abnormality         Status                     ---------                               -----------         ------                     CBC with platelets and d...[[id number]]                      Final result                 Please view results for these tests on the individual orders.     Medications   iopamidol (ISOVUE-370) solution 100 mL (has no administration in time range)   sodium chloride 0.9 % bag 100mL (has no administration in time range)   HYDROmorphone (PF) (DILAUDID) injection 0.5 mg (has no administration in time range)   oxyCODONE (ROXICODONE) tablet 5 mg (5 mg Oral $Given 7/17/24 1738)   sodium chloride 0.9% BOLUS 1,000 mL ( Intravenous Restarted 7/17/24 1915)   iopamidol (ISOVUE-370) solution 100 mL (90 mLs Intravenous $Given 7/17/24 2020)   sodium chloride 0.9 % bag 100mL (90 mLs Intravenous $Given 7/17/24 2020)     Labs Ordered and Resulted from Time of ED Arrival to Time of ED Departure   COMPREHENSIVE METABOLIC PANEL - Abnormal       Result Value    Sodium 138      Potassium 4.2      Carbon Dioxide (CO2) 24      Anion Gap 16 (*)     Urea Nitrogen 29.2 (*)      Creatinine 0.86      GFR Estimate 67      Calcium 10.5 (*)     Chloride 98      Glucose 103 (*)     Alkaline Phosphatase 204 (*)     AST 25      ALT 6      Protein Total 8.4 (*)     Albumin 4.0      Bilirubin Total 0.8     TROPONIN T, HIGH SENSITIVITY - Abnormal    Troponin T, High Sensitivity 27 (*)    TROPONIN T, HIGH SENSITIVITY - Abnormal    Troponin T, High Sensitivity 26 (*)    INR - Abnormal    INR 2.17 (*)    PARTIAL THROMBOPLASTIN TIME - Abnormal    aPTT 39 (*)    MAGNESIUM - Normal    Magnesium 1.7     CBC WITH PLATELETS AND DIFFERENTIAL    WBC Count 7.6      RBC Count 4.82      Hemoglobin 14.4      Hematocrit 43.2      MCV 90      MCH 29.9      MCHC 33.3      RDW 14.9      Platelet Count 434      % Neutrophils 76      % Lymphocytes 12      % Monocytes 11      % Eosinophils 0      % Basophils 0      % Immature Granulocytes 1      NRBCs per 100 WBC 0      Absolute Neutrophils 5.7      Absolute Lymphocytes 0.9      Absolute Monocytes 0.9      Absolute Eosinophils 0.0      Absolute Basophils 0.0      Absolute Immature Granulocytes 0.0      Absolute NRBCs 0.0     ROUTINE UA WITH MICROSCOPIC REFLEX TO CULTURE   IONIZED CALCIUM   TROPONIN T, HIGH SENSITIVITY   PROTEIN IMMUNOFIXATION SERUM   PROTEIN IMMUNOFIXATION URINE     CT Aortic Survey w Contrast   Final Result   IMPRESSION:      1.  No acute pulmonary embolism or acute aortic syndrome.   2.  Focal lytic lesion associated with the left T12 transverse process consistent with neoplasm, either representing a myelomatous lesion or lytic metastasis.   3.  Diverticulosis of the distal colon but no diverticulitis.   4.  Cholelithiasis.         CT Thoracic Spine Reconstructed   Final Result   IMPRESSION:   1.  Fracture of T11 with slight vertebral body height loss. There is suggestion of underlying lesion in the left pedicle extending into vertebral bodies and posterior elements as well as into adjacent soft tissues.    2.  MRI thoracic spine without and with  contrast recommended; providing patient is MRI compatible.   3.  No definite other fractures.                Critical care was not performed.     Medical Decision Making  The patient's presentation was of high complexity (an acute health issue posing potential threat to life or bodily function).    The patient's evaluation involved:  ordering and/or review of 3+ test(s) in this encounter (see separate area of note for details)    The patient's management necessitated high risk (a decision regarding hospitalization).    Assessment & Plan    Cara is an 84-year-old female that presented to the ED with complaints of nontraumatic severe back pain.  Acceptable vital signs initially on presentation but notable tachycardia in the 120s on EKG and on monitor when placed on cardiac monitoring without any chest symptoms including pain, palpitations, shortness of breath.  Otherwise afebrile without any hypotension, hypertension, hypoxia on room air.  No neurodeficits to the upper or lower extremities.  No loss of bladder or bowel function, saddle anesthesia, or complete weakness in the lower extremities.  No neurodeficits on exam.  Elevated calcium at 10.5 with pending ionized calcium.  Initial troponin 27 with repeat at 26 that was obtained due to tachycardia.  INR 2.17.   CT aortic survey negative for PE or acute aortic syndrome but notable for focal lytic lesion to the left T12 transverse process consistent with neoplasm as well as diverticulosis without diverticulitis and cholelithiasis.  CT thoracic spine reconstruction notable for fracture of T11 with slight vertebral body height loss without any other definitive fractures.  IV NS bolus in the ED with improvement of heart rate.  EKG negative for concerning ST segment changes indicating an ischemic event.  P.o. oxycodone and IV Dilaudid 0.5 in the ED for pain.  Discussed these new findings on her imaging with the patient at length as well as discussion and recommendation  of admission for pain control and as needed follow-up with oncology while in the hospital.  Patient voiced comfort and agreeability defer admission to the hospital due to her living alone and continuing to have uncontrolled pain.  Discussed patient case with triage hospitalist was agreeable to the admission plan.  All questions were answered to the best of ED provider ability prior to transfer of care to the general medicine service.    I have reviewed the nursing notes. I have reviewed the findings, diagnosis, plan and need for follow up with the patient.    New Prescriptions    No medications on file       Final diagnoses:   Lytic bone lesions on xray   Closed fracture of eleventh thoracic vertebra, unspecified fracture morphology, initial encounter (H)   Back pain     Lizet Rendon PA-C    --    ED Attending Physician Attestation    I Audrey Alexander MD, MD, cared for this patient with the Advanced Practice Provider ([facility]). I personally provided a substantive portion of the care for this patient, including approving the care plan for the number and complexity of problems addressed and taking responsibility related to the risk of complications and/or morbidity or mortality of patient management. Please see the LUCERO's documentation for full details.    Summary of HPI, PE, ED Course   Patient is a 84 year old female evaluated in the emergency department for mid back pain. Exam and ED course notable for labs ok but CT T 12 lytic lesion and T11 fx. After the completion of care in the emergency department, the patient was admitted to inpatient also added immunofixation serum and urine as labs and CT finding suggestive for myeloma with high Ca, nonanion gap metabolic acidosis and mildly elevated total protein..      Audrey Alexander MD, MD  Emergency Medicine    Audrey Alexander MD  [facility] EMERGENCY DEPARTMENT  7/17/2024     Lizet Rendon PA-C  07/17/24 2211       Audrey Alexander MD  07/18/24  0019

## 2024-07-18 ENCOUNTER — APPOINTMENT (OUTPATIENT)
Dept: CARDIOLOGY | Facility: CLINIC | Age: 84
DRG: 543 | End: 2024-07-18
Attending: NURSE PRACTITIONER
Payer: COMMERCIAL

## 2024-07-18 ENCOUNTER — APPOINTMENT (OUTPATIENT)
Dept: MRI IMAGING | Facility: CLINIC | Age: 84
DRG: 543 | End: 2024-07-18
Payer: COMMERCIAL

## 2024-07-18 ENCOUNTER — APPOINTMENT (OUTPATIENT)
Dept: MRI IMAGING | Facility: CLINIC | Age: 84
DRG: 543 | End: 2024-07-18
Attending: STUDENT IN AN ORGANIZED HEALTH CARE EDUCATION/TRAINING PROGRAM
Payer: COMMERCIAL

## 2024-07-18 ENCOUNTER — APPOINTMENT (OUTPATIENT)
Dept: OCCUPATIONAL THERAPY | Facility: CLINIC | Age: 84
DRG: 543 | End: 2024-07-18
Attending: STUDENT IN AN ORGANIZED HEALTH CARE EDUCATION/TRAINING PROGRAM
Payer: COMMERCIAL

## 2024-07-18 ENCOUNTER — APPOINTMENT (OUTPATIENT)
Dept: PHYSICAL THERAPY | Facility: CLINIC | Age: 84
DRG: 543 | End: 2024-07-18
Attending: STUDENT IN AN ORGANIZED HEALTH CARE EDUCATION/TRAINING PROGRAM
Payer: COMMERCIAL

## 2024-07-18 LAB
ALBUMIN SERPL BCG-MCNC: 3.4 G/DL (ref 3.5–5.2)
ALP SERPL-CCNC: 178 U/L (ref 40–150)
ALT SERPL W P-5'-P-CCNC: <5 U/L (ref 0–50)
ANION GAP SERPL CALCULATED.3IONS-SCNC: 15 MMOL/L (ref 7–15)
AST SERPL W P-5'-P-CCNC: 18 U/L (ref 0–45)
ATRIAL RATE - MUSE: 122 BPM
ATRIAL RATE - MUSE: 133 BPM
ATRIAL RATE - MUSE: 60 BPM
B2 GLYCOPROT1 IGG SERPL IA-ACNC: 1.4 U/ML
B2 GLYCOPROT1 IGM SERPL IA-ACNC: 2.5 U/ML
BASOPHILS # BLD AUTO: 0 10E3/UL (ref 0–0.2)
BASOPHILS NFR BLD AUTO: 0 %
BILIRUB SERPL-MCNC: 0.7 MG/DL
BUN SERPL-MCNC: 27.3 MG/DL (ref 8–23)
CA-I BLD-MCNC: 4.8 MG/DL (ref 4.4–5.2)
CALCIUM SERPL-MCNC: 10 MG/DL (ref 8.8–10.4)
CHLORIDE SERPL-SCNC: 102 MMOL/L (ref 98–107)
CREAT SERPL-MCNC: 0.89 MG/DL (ref 0.51–0.95)
CREAT SERPL-MCNC: 0.9 MG/DL (ref 0.51–0.95)
DIASTOLIC BLOOD PRESSURE - MUSE: NORMAL MMHG
DIGOXIN SERPL-MCNC: 0.7 NG/ML (ref 0.6–2)
EGFRCR SERPLBLD CKD-EPI 2021: 64 ML/MIN/1.73M2
EGFRCR SERPLBLD CKD-EPI 2021: 64 ML/MIN/1.73M2
EOSINOPHIL # BLD AUTO: 0.1 10E3/UL (ref 0–0.7)
EOSINOPHIL NFR BLD AUTO: 1 %
ERYTHROCYTE [DISTWIDTH] IN BLOOD BY AUTOMATED COUNT: 15 % (ref 10–15)
GLUCOSE SERPL-MCNC: 102 MG/DL (ref 70–99)
HCO3 SERPL-SCNC: 23 MMOL/L (ref 22–29)
HCT VFR BLD AUTO: 42 % (ref 35–47)
HGB BLD-MCNC: 13.6 G/DL (ref 11.7–15.7)
IMM GRANULOCYTES # BLD: 0 10E3/UL
IMM GRANULOCYTES NFR BLD: 1 %
INTERPRETATION ECG - MUSE: NORMAL
KAPPA LC FREE SER-MCNC: 5.36 MG/DL (ref 0.33–1.94)
KAPPA LC FREE/LAMBDA FREE SER NEPH: 1.97 {RATIO} (ref 0.26–1.65)
LAMBDA LC FREE SERPL-MCNC: 2.72 MG/DL (ref 0.57–2.63)
LDH SERPL L TO P-CCNC: 344 U/L (ref 0–250)
LVEF ECHO: NORMAL
LYMPHOCYTES # BLD AUTO: 1 10E3/UL (ref 0.8–5.3)
LYMPHOCYTES NFR BLD AUTO: 19 %
MCH RBC QN AUTO: 29.5 PG (ref 26.5–33)
MCHC RBC AUTO-ENTMCNC: 32.4 G/DL (ref 31.5–36.5)
MCV RBC AUTO: 91 FL (ref 78–100)
MONOCYTES # BLD AUTO: 0.8 10E3/UL (ref 0–1.3)
MONOCYTES NFR BLD AUTO: 16 %
NEUTROPHILS # BLD AUTO: 3.2 10E3/UL (ref 1.6–8.3)
NEUTROPHILS NFR BLD AUTO: 63 %
NRBC # BLD AUTO: 0 10E3/UL
NRBC BLD AUTO-RTO: 0 /100
P AXIS - MUSE: NORMAL DEGREES
PHOSPHATE SERPL-MCNC: 3.2 MG/DL (ref 2.5–4.5)
PLATELET # BLD AUTO: 404 10E3/UL (ref 150–450)
POTASSIUM SERPL-SCNC: 3.7 MMOL/L (ref 3.4–5.3)
PR INTERVAL - MUSE: NORMAL MS
PROT PATTERN SERPL IFE-IMP: NORMAL
PROT SERPL-MCNC: 7.3 G/DL (ref 6.4–8.3)
QRS DURATION - MUSE: 110 MS
QRS DURATION - MUSE: 78 MS
QRS DURATION - MUSE: 80 MS
QT - MUSE: 312 MS
QT - MUSE: 316 MS
QT - MUSE: 316 MS
QTC - MUSE: 452 MS
QTC - MUSE: 453 MS
QTC - MUSE: 457 MS
R AXIS - MUSE: 0 DEGREES
R AXIS - MUSE: 1 DEGREES
R AXIS - MUSE: 2 DEGREES
RBC # BLD AUTO: 4.61 10E6/UL (ref 3.8–5.2)
SODIUM SERPL-SCNC: 140 MMOL/L (ref 135–145)
SYSTOLIC BLOOD PRESSURE - MUSE: NORMAL MMHG
T AXIS - MUSE: 140 DEGREES
T AXIS - MUSE: 142 DEGREES
T AXIS - MUSE: 147 DEGREES
TROPONIN T SERPL HS-MCNC: 34 NG/L
TROPONIN T SERPL HS-MCNC: 35 NG/L
VENTRICULAR RATE- MUSE: 123 BPM
VENTRICULAR RATE- MUSE: 124 BPM
VENTRICULAR RATE- MUSE: 129 BPM
WBC # BLD AUTO: 5.1 10E3/UL (ref 4–11)

## 2024-07-18 PROCEDURE — 250N000013 HC RX MED GY IP 250 OP 250 PS 637

## 2024-07-18 PROCEDURE — 86334 IMMUNOFIX E-PHORESIS SERUM: CPT | Performed by: PATHOLOGY

## 2024-07-18 PROCEDURE — 84166 PROTEIN E-PHORESIS/URINE/CSF: CPT | Performed by: PATHOLOGY

## 2024-07-18 PROCEDURE — 82374 ASSAY BLOOD CARBON DIOXIDE: CPT | Performed by: STUDENT IN AN ORGANIZED HEALTH CARE EDUCATION/TRAINING PROGRAM

## 2024-07-18 PROCEDURE — 99222 1ST HOSP IP/OBS MODERATE 55: CPT | Mod: AI | Performed by: STUDENT IN AN ORGANIZED HEALTH CARE EDUCATION/TRAINING PROGRAM

## 2024-07-18 PROCEDURE — 82232 ASSAY OF BETA-2 PROTEIN: CPT | Performed by: PATHOLOGY

## 2024-07-18 PROCEDURE — 83521 IG LIGHT CHAINS FREE EACH: CPT | Performed by: PATHOLOGY

## 2024-07-18 PROCEDURE — 84166 PROTEIN E-PHORESIS/URINE/CSF: CPT | Mod: 26 | Performed by: PATHOLOGY

## 2024-07-18 PROCEDURE — 72148 MRI LUMBAR SPINE W/O DYE: CPT | Mod: 26 | Performed by: RADIOLOGY

## 2024-07-18 PROCEDURE — 84155 ASSAY OF PROTEIN SERUM: CPT | Performed by: PATHOLOGY

## 2024-07-18 PROCEDURE — 93010 ELECTROCARDIOGRAM REPORT: CPT | Performed by: INTERNAL MEDICINE

## 2024-07-18 PROCEDURE — 250N000012 HC RX MED GY IP 250 OP 636 PS 637: Performed by: FAMILY MEDICINE

## 2024-07-18 PROCEDURE — 97535 SELF CARE MNGMENT TRAINING: CPT | Mod: GO | Performed by: OCCUPATIONAL THERAPIST

## 2024-07-18 PROCEDURE — G0378 HOSPITAL OBSERVATION PER HR: HCPCS

## 2024-07-18 PROCEDURE — 84484 ASSAY OF TROPONIN QUANT: CPT | Performed by: STUDENT IN AN ORGANIZED HEALTH CARE EDUCATION/TRAINING PROGRAM

## 2024-07-18 PROCEDURE — 86335 IMMUNFIX E-PHORSIS/URINE/CSF: CPT | Performed by: PATHOLOGY

## 2024-07-18 PROCEDURE — 72146 MRI CHEST SPINE W/O DYE: CPT | Mod: 26 | Performed by: RADIOLOGY

## 2024-07-18 PROCEDURE — 99222 1ST HOSP IP/OBS MODERATE 55: CPT | Mod: 25 | Performed by: NURSE PRACTITIONER

## 2024-07-18 PROCEDURE — 120N000002 HC R&B MED SURG/OB UMMC

## 2024-07-18 PROCEDURE — 82330 ASSAY OF CALCIUM: CPT | Performed by: STUDENT IN AN ORGANIZED HEALTH CARE EDUCATION/TRAINING PROGRAM

## 2024-07-18 PROCEDURE — 36415 COLL VENOUS BLD VENIPUNCTURE: CPT | Performed by: STUDENT IN AN ORGANIZED HEALTH CARE EDUCATION/TRAINING PROGRAM

## 2024-07-18 PROCEDURE — 84165 PROTEIN E-PHORESIS SERUM: CPT | Mod: TC | Performed by: PATHOLOGY

## 2024-07-18 PROCEDURE — 82784 ASSAY IGA/IGD/IGG/IGM EACH: CPT | Performed by: PATHOLOGY

## 2024-07-18 PROCEDURE — 93306 TTE W/DOPPLER COMPLETE: CPT | Mod: 26 | Performed by: INTERNAL MEDICINE

## 2024-07-18 PROCEDURE — 84100 ASSAY OF PHOSPHORUS: CPT | Performed by: STUDENT IN AN ORGANIZED HEALTH CARE EDUCATION/TRAINING PROGRAM

## 2024-07-18 PROCEDURE — 86335 IMMUNFIX E-PHORSIS/URINE/CSF: CPT | Mod: 26 | Performed by: PATHOLOGY

## 2024-07-18 PROCEDURE — 85025 COMPLETE CBC W/AUTO DIFF WBC: CPT | Performed by: STUDENT IN AN ORGANIZED HEALTH CARE EDUCATION/TRAINING PROGRAM

## 2024-07-18 PROCEDURE — 82040 ASSAY OF SERUM ALBUMIN: CPT | Performed by: STUDENT IN AN ORGANIZED HEALTH CARE EDUCATION/TRAINING PROGRAM

## 2024-07-18 PROCEDURE — 83615 LACTATE (LD) (LDH) ENZYME: CPT | Performed by: STUDENT IN AN ORGANIZED HEALTH CARE EDUCATION/TRAINING PROGRAM

## 2024-07-18 PROCEDURE — 72146 MRI CHEST SPINE W/O DYE: CPT

## 2024-07-18 PROCEDURE — 255N000002 HC RX 255 OP 636: Performed by: INTERNAL MEDICINE

## 2024-07-18 PROCEDURE — 72148 MRI LUMBAR SPINE W/O DYE: CPT

## 2024-07-18 PROCEDURE — 80162 ASSAY OF DIGOXIN TOTAL: CPT | Performed by: STUDENT IN AN ORGANIZED HEALTH CARE EDUCATION/TRAINING PROGRAM

## 2024-07-18 PROCEDURE — 84165 PROTEIN E-PHORESIS SERUM: CPT | Mod: 26 | Performed by: PATHOLOGY

## 2024-07-18 PROCEDURE — 999N000208 ECHOCARDIOGRAM COMPLETE

## 2024-07-18 PROCEDURE — 97161 PT EVAL LOW COMPLEX 20 MIN: CPT | Mod: GP

## 2024-07-18 PROCEDURE — 250N000013 HC RX MED GY IP 250 OP 250 PS 637: Performed by: STUDENT IN AN ORGANIZED HEALTH CARE EDUCATION/TRAINING PROGRAM

## 2024-07-18 PROCEDURE — 97165 OT EVAL LOW COMPLEX 30 MIN: CPT | Mod: GO | Performed by: OCCUPATIONAL THERAPIST

## 2024-07-18 PROCEDURE — 99221 1ST HOSP IP/OBS SF/LOW 40: CPT | Mod: GC | Performed by: NEUROLOGICAL SURGERY

## 2024-07-18 PROCEDURE — 99223 1ST HOSP IP/OBS HIGH 75: CPT | Mod: FS | Performed by: PHYSICIAN ASSISTANT

## 2024-07-18 PROCEDURE — 86146 BETA-2 GLYCOPROTEIN ANTIBODY: CPT | Performed by: STUDENT IN AN ORGANIZED HEALTH CARE EDUCATION/TRAINING PROGRAM

## 2024-07-18 PROCEDURE — 93005 ELECTROCARDIOGRAM TRACING: CPT

## 2024-07-18 PROCEDURE — 99418 PROLNG IP/OBS E/M EA 15 MIN: CPT | Performed by: PHYSICIAN ASSISTANT

## 2024-07-18 PROCEDURE — 86334 IMMUNOFIX E-PHORESIS SERUM: CPT | Mod: 26 | Performed by: PATHOLOGY

## 2024-07-18 PROCEDURE — 97530 THERAPEUTIC ACTIVITIES: CPT | Mod: GP

## 2024-07-18 PROCEDURE — C8929 TTE W OR WO FOL WCON,DOPPLER: HCPCS

## 2024-07-18 RX ORDER — NALOXONE HYDROCHLORIDE 0.4 MG/ML
0.2 INJECTION, SOLUTION INTRAMUSCULAR; INTRAVENOUS; SUBCUTANEOUS
Status: DISCONTINUED | OUTPATIENT
Start: 2024-07-18 | End: 2024-07-25 | Stop reason: HOSPADM

## 2024-07-18 RX ORDER — SENNOSIDES 8.6 MG
8.6 TABLET ORAL 2 TIMES DAILY
Status: DISCONTINUED | OUTPATIENT
Start: 2024-07-18 | End: 2024-07-25 | Stop reason: HOSPADM

## 2024-07-18 RX ORDER — DIGOXIN 125 MCG
125 TABLET ORAL DAILY
Status: DISCONTINUED | OUTPATIENT
Start: 2024-07-18 | End: 2024-07-18

## 2024-07-18 RX ORDER — VITAMIN B COMPLEX
25 TABLET ORAL DAILY
Status: DISCONTINUED | OUTPATIENT
Start: 2024-07-18 | End: 2024-07-25 | Stop reason: HOSPADM

## 2024-07-18 RX ORDER — HYDROCHLOROTHIAZIDE 50 MG/1
50 TABLET ORAL DAILY
Status: DISCONTINUED | OUTPATIENT
Start: 2024-07-18 | End: 2024-07-25 | Stop reason: HOSPADM

## 2024-07-18 RX ORDER — SPIRONOLACTONE 25 MG/1
50 TABLET ORAL DAILY
Status: DISCONTINUED | OUTPATIENT
Start: 2024-07-18 | End: 2024-07-25 | Stop reason: HOSPADM

## 2024-07-18 RX ORDER — ALENDRONATE SODIUM 70 MG/1
70 TABLET ORAL
Status: DISCONTINUED | OUTPATIENT
Start: 2024-07-18 | End: 2024-07-18

## 2024-07-18 RX ORDER — NALOXONE HYDROCHLORIDE 0.4 MG/ML
0.4 INJECTION, SOLUTION INTRAMUSCULAR; INTRAVENOUS; SUBCUTANEOUS
Status: DISCONTINUED | OUTPATIENT
Start: 2024-07-18 | End: 2024-07-25 | Stop reason: HOSPADM

## 2024-07-18 RX ORDER — ATORVASTATIN CALCIUM 20 MG/1
20 TABLET, FILM COATED ORAL DAILY
Status: DISCONTINUED | OUTPATIENT
Start: 2024-07-18 | End: 2024-07-25 | Stop reason: HOSPADM

## 2024-07-18 RX ORDER — OXYCODONE HYDROCHLORIDE 5 MG/1
5 TABLET ORAL EVERY 4 HOURS PRN
Status: DISCONTINUED | OUTPATIENT
Start: 2024-07-18 | End: 2024-07-21

## 2024-07-18 RX ORDER — LIDOCAINE 4 G/G
1 PATCH TOPICAL
Status: DISCONTINUED | OUTPATIENT
Start: 2024-07-18 | End: 2024-07-25 | Stop reason: HOSPADM

## 2024-07-18 RX ORDER — POLYETHYLENE GLYCOL 3350 17 G/17G
1 POWDER, FOR SOLUTION ORAL DAILY PRN
COMMUNITY

## 2024-07-18 RX ORDER — AZATHIOPRINE 50 MG/1
50 TABLET ORAL DAILY
Status: DISCONTINUED | OUTPATIENT
Start: 2024-07-18 | End: 2024-07-18

## 2024-07-18 RX ORDER — ACETAMINOPHEN 325 MG/1
650 TABLET ORAL EVERY 8 HOURS PRN
Status: DISCONTINUED | OUTPATIENT
Start: 2024-07-18 | End: 2024-07-25 | Stop reason: HOSPADM

## 2024-07-18 RX ADMIN — OXYCODONE HYDROCHLORIDE 5 MG: 5 TABLET ORAL at 21:17

## 2024-07-18 RX ADMIN — AZATHIOPRINE 75 MG: 50 TABLET ORAL at 11:58

## 2024-07-18 RX ADMIN — SENNOSIDES 8.6 MG: 8.6 TABLET, FILM COATED ORAL at 09:12

## 2024-07-18 RX ADMIN — Medication 25 MCG: at 09:12

## 2024-07-18 RX ADMIN — Medication 12.5 MG: at 20:35

## 2024-07-18 RX ADMIN — OXYCODONE HYDROCHLORIDE 5 MG: 5 TABLET ORAL at 09:12

## 2024-07-18 RX ADMIN — ACETAMINOPHEN 650 MG: 325 TABLET, FILM COATED ORAL at 02:36

## 2024-07-18 RX ADMIN — POLYETHYLENE GLYCOL 3350 17 G: 17 POWDER, FOR SOLUTION ORAL at 09:12

## 2024-07-18 RX ADMIN — ATORVASTATIN CALCIUM 20 MG: 20 TABLET, FILM COATED ORAL at 09:12

## 2024-07-18 RX ADMIN — OXYCODONE HYDROCHLORIDE 5 MG: 5 TABLET ORAL at 02:36

## 2024-07-18 RX ADMIN — HUMAN ALBUMIN MICROSPHERES AND PERFLUTREN 5 ML: 10; .22 INJECTION, SOLUTION INTRAVENOUS at 13:15

## 2024-07-18 RX ADMIN — SENNOSIDES 8.6 MG: 8.6 TABLET, FILM COATED ORAL at 20:35

## 2024-07-18 RX ADMIN — SPIRONOLACTONE 50 MG: 25 TABLET ORAL at 09:12

## 2024-07-18 RX ADMIN — RIVAROXABAN 20 MG: 10 TABLET, FILM COATED ORAL at 17:31

## 2024-07-18 RX ADMIN — HYDROCHLOROTHIAZIDE 50 MG: 50 TABLET ORAL at 09:12

## 2024-07-18 RX ADMIN — SENNOSIDES AND DOCUSATE SODIUM 2 TABLET: 50; 8.6 TABLET ORAL at 02:36

## 2024-07-18 ASSESSMENT — ACTIVITIES OF DAILY LIVING (ADL)
ADLS_ACUITY_SCORE: 32
ADLS_ACUITY_SCORE: 35
ADLS_ACUITY_SCORE: 32
ADLS_ACUITY_SCORE: 35
ADLS_ACUITY_SCORE: 32
ADLS_ACUITY_SCORE: 35
ADLS_ACUITY_SCORE: 35
PREVIOUS_RESPONSIBILITIES: MEAL PREP;HOUSEKEEPING;LAUNDRY;SHOPPING;MEDICATION MANAGEMENT;FINANCES
ADLS_ACUITY_SCORE: 35
ADLS_ACUITY_SCORE: 35
ADLS_ACUITY_SCORE: 32
ADLS_ACUITY_SCORE: 35
ADLS_ACUITY_SCORE: 35
ADLS_ACUITY_SCORE: 32
ADLS_ACUITY_SCORE: 32
ADLS_ACUITY_SCORE: 35
ADLS_ACUITY_SCORE: 32
ADLS_ACUITY_SCORE: 32
ADLS_ACUITY_SCORE: 35
ADLS_ACUITY_SCORE: 32
ADLS_ACUITY_SCORE: 35

## 2024-07-18 NOTE — CONSULTS
Hematology Consult Note   Date of Service: 07/18/2024    Patient: Cara Cool  MRN: [id number]  Admission Date: 7/17/2024  Hospital Day # Hospital Day: 2  Primary Outpatient Hematologist: Dr. Partha Sherman at [facility]    Reason for Consult: MGUS, unclear if MM?  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      #MGUS (co-migrating IgM and IgG kappa)-dx 2017  -Last visit with hematology 12/2023, labs stable, recommended follow-up in 6 months with repeat labs   -M spike has been stable since 10/2020   -k/l FLC ratio 1.97 (previously 2.64 12/2023, and 2.28 11/2021)  -CBC without anemia, no renal dysfunction, or hypercalcemia     #possible lytic bone lesions of T12/Fracture of T11  -myelomatous vs metastatic, MRI recommended   -CT aortic survey without evidence of RCC recurrence (has renal cysts), no other evidence of malignancy     Recommendations:   -follow-up SPEP, b2 microglobulin   -Follow-up MRI spine for further characterization of lesion  -Bone scan   -Given low suspicion for progression of MGUS as cause of lytic  lesion we favor biopsy of spinal lesion over bone marrow biopsy for next diagnostic step-- appreciate neurosurgery consultation    Patient was seen and plan of care was discussed with attending physician Dr. Huang.    We will continue to follow this patient. Please don't hesitate to contact the Fellow On-Call with questions.    I spent 35 minutes in the care of this patient today, which included time necessary for preparation for the visit, obtaining history, ordering medications/tests/procedures as medically indicated, review of pertinent medical literature, counseling of the patient, communication of recommendations to the care team, and documentation time.     Karly Granados PA-C  [facility]  [phone]        HEMATOLOGY STAFF  Patient was seen and evaluated as part of a shared APRN/PA visit.  Chart notes, vital signs, labs, and imaging studies reviewed.      At this time, our suspicion for multiple myeloma is low, although some laboratory tests are still pending (e.g. repeat SPEP).  However as of December 2023, the amount of monoclonal protein in her serum was stable to decreased over the previous several years ([facility]  In addition her current renal function is normal and unchanged from baseline, her CBC is completely normal (no anemia), and although she has elevated free kappa and lambda light chains in her serum, the degree of elevation is slight and unchanged over the last several years when compared with outside labs.  Her calcium is high normal to minimally elevated, but this is also not a new finding, and dates back at least several years when compared to outside labs.    Thus, although she does have a monoclonal gammopathy (MGUS) our suspicion for transformation to myeloma is low.  Nonetheless, the lytic bone lesion is concerning and warrants further workup.  At this time, biopsy of that lesion is a higher yield test than bone marrow biopsy given our low suspicion for  myeloma.    Total time on date of encounter 80 minutes.      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        Hematologic History:  #IgM Kappa MGUS- Dx 2017 in the setting of acute autoimmune hepatitis  --11/3/2017:   -SPEP 0.8  monoclonal protein, IgM kappa  -k/l FLC: kappa elevated 3.44, lambda WNL 2.45, no ratio reported     12/15/27:   -UPEP without monoclonal protein     4/6/18:   -SPEP with 0.3 monoclonal protein   --k/l FLC: kappa elevated 6.58, lambda elevated 3.01, no ratio reported     10/19/18:   -SPEP with 0.4 monoclonal protein   -k/l FLC: kappa elevated 7.57, lambda elevated 3.49,  no ratio reported     4/19/19:  -SPEP with 0.3 monoclonal protein   -k/l FLC: kappa elevated 5.6, lambda elevated  2.72, ratio elevated 2.06    11/7/19:   -SPEP with 0.3 monoclonal protein   -k/l FLC: kappa elevated 5.79, lambda elevated 2.73, ratio 2.12     10/8/2020:   -SPEP with 0.2 monoclonal protein, now comitigating IgM and IgG kappa   -k/l ratio: kappa elevated 5.47, lambda WNL 2.47, ratio elevated 2.21    8/11/21:   -SPEP with 0.2 monoclonal protein, comitigating IgM and IgG kappa   -k/l ratio: kappa elevated 6.13, lambda elevated 2.69, ratio elevated 2.28    -1/25/24: most recent visit with Dr. Partha Sherman at [facility] (prior to  that hadn't been seen since 2021)  -M spike 0.2, comigrating IgM and IgG kappa   -k/l ratio: kappa elevated 5.18, lambda WNL 1.96, k/l ratio 2.64  -B2 microglobulin 3.2  -Everything stable, recommended to return in 6 months for follow-up     7/17/24:   -Presented with back pain   -CT spine without contrast showed Fracture of T11 with slight vertebral body height loss- suggestion of underlying lesion in left pedicle extending into adjacent soft tissues, recommend MRI spine  -CT aortic survey lytic lesion related to the left transverse process of T12. No lymphadenopathy or malignancy noted.    Review of Systems: Pertinent positive and negative systems described in HPI; the remainder of the 14 systems are negative    Past Medical History:  Past Medical History:   Diagnosis Date    Arthritis     Hypertension        Past Surgical History:  Past Surgical History:   Procedure Laterality Date    ORTHOPEDIC SURGERY      hip and knee replacement surgery       Social History:  Social History     Socioeconomic History    Marital status:      Spouse name: None    Number of children: None    Years of education: None    Highest education level: None   Tobacco Use    Smoking status: Never    Smokeless tobacco: Never   Substance and Sexual Activity    Alcohol use: Not Currently    Drug use: Never        Family History  History reviewed. No pertinent family history.    Outpatient Medications:  Current Facility-Administered Medications   Medication Dose Route Frequency Provider Last Rate Last Admin    acetaminophen (TYLENOL) tablet 650 mg  650 mg Oral Q8H PRN Luc Hammonds MD   650 mg at 07/18/24 0236    atorvastatin (LIPITOR) tablet 20 mg  20 mg Oral Daily Luc Hammonds MD   20 mg at 07/18/24 0912    azaTHIOprine (IMURAN) tablet 50 mg  50 mg Oral Daily Luc Hammonds MD        calcium carbonate (TUMS) chewable tablet 1,000 mg  1,000 mg Oral 4x Daily PRN Luc Hammonds MD        [Held by provider]  digoxin (LANOXIN) tablet 125 mcg  125 mcg Oral Daily Luc Hammonds MD        hydrochlorothiazide (HYDRODIURIL) tablet 50 mg  50 mg Oral Daily Luc Hammonds MD   50 mg at 07/18/24 0912    Lidocaine (LIDOCARE) 4 % Patch 1 patch  1 patch Transdermal Q24H PRN Luc Hammonds MD        lidocaine (LMX4) cream   Topical Q1H PRN Luc Hammonds MD        lidocaine 1 % 0.1-1 mL  0.1-1 mL Other Q1H PRN Luc Hammonds MD        naloxone (NARCAN) injection 0.2 mg  0.2 mg Intravenous Q2 Min PRN Mayra Nicolas MD        Or    naloxone (NARCAN) injection 0.4 mg  0.4 mg Intravenous Q2 Min PRMayra Noonan MD        Or    naloxone (NARCAN) injection 0.2 mg  0.2 mg Intramuscular Q2 Min PRMayra Noonan MD        Or    naloxone (NARCAN) injection 0.4 mg  0.4 mg Intramuscular Q2 Min PRMayra Noonan MD        oxyCODONE (ROXICODONE) tablet 5 mg  5 mg Oral Q4H PRN Mayra Nicolas MD   5 mg at 07/18/24 0912    Or    oxyCODONE IR (ROXICODONE) half-tab 2.5 mg  2.5 mg Oral Q4H PRN Mayra Nicolas MD        polyethylene glycol (MIRALAX) Packet 17 g  17 g Oral Daily Luc Hammonds MD   17 g at 07/18/24 0912    rivaroxaban ANTICOAGULANT (XARELTO) tablet 20 mg  20 mg Oral Daily with supper Luc Hammonds MD        senna-docusate (SENOKOT-S/PERICOLACE) 8.6-50 MG per tablet 1 tablet  1 tablet Oral BID PRN Luc Hammonds MD        Or    senna-docusate (SENOKOT-S/PERICOLACE) 8.6-50 MG per tablet 2 tablet  2 tablet Oral BID PRN Luc Hammonds MD   2 tablet at 07/18/24 0236    sennosides (SENOKOT) tablet 8.6 mg  8.6 mg Oral BID Luc Hammonds MD   8.6 mg at 07/18/24 0912    sodium chloride (PF) 0.9% PF flush 3 mL  3 mL Intracatheter Q8H Luc Hammonds MD   3 mL at 07/18/24 0915    sodium chloride (PF) 0.9% PF flush 3 mL  3 mL Intracatheter q1 min prn Luc Hammonds MD        spironolactone (ALDACTONE) tablet 50 mg  50 mg Oral Daily Luc Hammonds MD   50 mg at 07/18/24 0912     Vitamin D3 (CHOLECALCIFEROL) tablet 25 mcg  25 mcg Oral Daily Luc Hammonds MD   25 mcg at 07/18/24 0912        Physical Exam:    /69 (BP Location: Right arm)   Pulse 77   Temp 97.5  F (36.4  C) (Oral)   Resp 19   Wt 107.1 kg (236 lb 1.6 oz)   SpO2 100%   Constitutional: Awake, alert, cooperative, in NAD. Sitting up in bed.   Eyes: EOMI, sclera clear, conjunctiva normal.   ENT: Normocephalic, without obvious abnormality, moist mucus membranes   Respiratory: Non-labored breathing.   Cardiovascular: no edema, warm well perfused   GI: soft, non-distended   Skin: No concerning lesions or rash on exposed areas.   Musculoskeletal: Normal bulk.   Neurologic: Awake, alert & oriented x 3, clear speech, moves all 4 extremities   Psych: appropriate affect     Labs & Studies: I personally reviewed the following studies:  ROUTINE LABS (Last four results):  CMP  Recent Labs   Lab 07/18/24  0649 07/18/24  0026 07/17/24 1914 07/17/24  1804     --   --  138   POTASSIUM 3.7  --   --  4.2   CHLORIDE 102  --   --  98   CO2 23  --   --  24   ANIONGAP 15  --   --  16*   *  --   --  103*   BUN 27.3*  --   --  29.2*   CR 0.90  --  0.89 0.86   GFRESTIMATED 64  --  64 67   TREVOR 10.0  --   --  10.5*   MAG  --   --   --  1.7   PHOS  --  3.2  --   --    PROTTOTAL 7.3  --   --  8.4*   ALBUMIN 3.4*  --   --  4.0   BILITOTAL 0.7  --   --  0.8   ALKPHOS 178*  --   --  204*   AST 18  --   --  25   ALT <5  --   --  6     CBC  Recent Labs   Lab 07/18/24 0649 07/17/24  1804   WBC 5.1 7.6   RBC 4.61 4.82   HGB 13.6 14.4   HCT 42.0 43.2   MCV 91 90   MCH 29.5 29.9   MCHC 32.4 33.3   RDW 15.0 14.9    434     INR  Recent Labs   Lab 07/17/24 1914   INR 2.17*

## 2024-07-18 NOTE — PROGRESS NOTES
07/18/24 1511   Appointment Info   Signing Clinician's Name / Credentials (PT) Emma Westphalen, SPT   Student Supervision On-site supervision provided   Living Environment   People in Home alone   Current Living Arrangements house   Home Accessibility stairs to enter home;stairs within home   Number of Stairs, Main Entrance 6   Stair Railings, Main Entrance railings on both sides of stairs   Number of Stairs, Within Home, Primary four  (4 stairs to get to basement where laundry is)   Stair Railings, Within Home, Primary railing on left side (ascending)   Transportation Anticipated family or friend will provide  (son in-law can drive her)   Living Environment Comments granddaughter comes over to help with cleanin when needed   Self-Care   Usual Activity Tolerance good   Current Activity Tolerance moderate   Regular Exercise No   Equipment Currently Used at Home cane, straight;shower chair;grab bar, tub/shower;raised toilet seat;walker, standard   Fall history within last six months no   Activity/Exercise/Self-Care Comment Pt typically completes all ADLs/ self-care independently, has family that lives close and can come help when necessary. Pt uses cane when ambulating longer distances, but reports she is a furniture surfer at home   General Information   Onset of Illness/Injury or Date of Surgery 07/17/24   Referring Physician Luc Hammonds MD   Patient/Family Therapy Goals Statement (PT) not endorsed   Pertinent History of Current Problem (include personal factors and/or comorbidities that impact the POC) per EMR: Cara Cool is an 84 year old female admitted on 7/17/24 for worsening non-traumatic back pain. Relevant PMHx of RCC, b/l renal cysts, MGUS, saddle PE with acute cor pulmonale, atrial tachycardia, and paroxysmal afib. Found to have left T12 transverse process lytic lesion consistent with neoplasm, T11 fx, tachycardia (resolved) and junctional rhythm on EKG. Currently, getting worked up for MM  and possibly getting T12 biopsy. Hematology, cardiology, neurosurgery, and IR onboard.   Existing Precautions/Restrictions no known precautions/restrictions   Weight-Bearing Status - LUE full weight-bearing   Weight-Bearing Status - RUE full weight-bearing   Weight-Bearing Status - LLE full weight-bearing   Weight-Bearing Status - RLE full weight-bearing   Cognition   Affect/Mental Status (Cognition) WNL   Pain Assessment   Patient Currently in Pain Yes, see Vital Sign flowsheet   Integumentary/Edema   Integumentary/Edema no deficits were identifed   Posture    Posture Forward head position;Protracted shoulders;Kyphosis   Range of Motion (ROM)   Range of Motion ROM is WFL   Strength (Manual Muscle Testing)   Strength (Manual Muscle Testing) strength is WFL   Bed Mobility   Comment, (Bed Mobility) Pt performed supine <> sit using logroll technique with SBA and at very slow speed   Transfers   Comment, (Transfers) Pt performed STS with SBA using cane/FWW   Gait/Stairs (Locomotion)   Comment, (Gait/Stairs) Did not assess stairs at this time; pt able to ambulate 75' with CGA using cane initially and trailing FWW   Balance   Balance Comments Pt has good sitting balance, fair balance with standing and ambulation   Sensory Examination   Sensory Perception patient reports no sensory changes   Coordination   Coordination no deficits were identified   Muscle Tone   Muscle Tone no deficits were identified   Clinical Impression   Criteria for Skilled Therapeutic Intervention Yes, treatment indicated   PT Diagnosis (PT) decreased functional mobility   Influenced by the following impairments back pain which increases with mobility, weakness. decreased activity tolerance   Functional limitations due to impairments bed mobility, transfers, gait and stairs   Clinical Presentation (PT Evaluation Complexity) stable   Clinical Presentation Rationale per clinical judgement   Clinical Decision Making (Complexity) low complexity   Planned  Therapy Interventions (PT) balance training;bed mobility training;gait training;groups;home exercise program;patient/family education;postural re-education;ROM (range of motion);stair training;strengthening;stretching;transfer training;progressive activity/exercise;risk factor education;home program guidelines;other (see comments);neuromuscular re-education   Risk & Benefits of therapy have been explained evaluation/treatment results reviewed;care plan/treatment goals reviewed   PT Total Evaluation Time   PT Eval, Low Complexity Minutes (59780) 5   Physical Therapy Goals   PT Frequency Daily   PT Predicted Duration/Target Date for Goal Attainment 07/25/24   PT Goals Bed Mobility;Transfers;Gait;Stairs   PT: Bed Mobility Independent  (using logroll)   PT: Transfers Modified independent   PT: Gait Modified independent;100 feet   PT: Stairs 6 stairs;Supervision/stand-by assist;Rail on both sides   Interventions   Interventions Quick Adds Therapeutic Activity   Therapeutic Activity   Therapeutic Activities: dynamic activities to improve functional performance Minutes (04458) 13   Symptoms Noted During/After Treatment Increased pain   Treatment Detail/Skilled Intervention Pt supine in bed upon arrival and agreeable to therapy. Explained to pt using logroll technique when getting in/out of bed d/t severe back pain and potential MM. Pt was able to complete supine <> sitting using the logroll technique, but needed extra time to complete due to increasing back pain. Pt completed sit>stand with SBA and use of cane. Pt ambulated ~30' with cane in R and began using rail on L, so recommended FWW use. Pt ambulated addition ~50' with FWW' pt had no LOB or instability with ambulation but had to take several standing rest breaks due to increased back pain. Pt supine in bed at end of session with call light within reach and all needs met.   PT Discharge Planning   PT Plan bed mobility using logroll, progress ambulation distance with  can vs FWW, STSs, initiate stairs when appropriate   PT Discharge Recommendation (DC Rec) home with home care physical therapy;Transitional Care Facility   PT Rationale for DC Rec Pt lives alone with 6 stairs to enter the home and 4 stairs down to the laundry, but has family close that is available to help with needed. Pts' mobility is currently limited d/t severe back pain, but once pain is better controlled, anticipate that pts mobility will improve and can safely discharge home due to previously being independent and being motivated to gain mobility back.   PT Brief overview of current status SBA- CGA for mobility with FWW/cane   Total Session Time   Timed Code Treatment Minutes 13   Total Session Time (sum of timed and untimed services) 18

## 2024-07-18 NOTE — PROGRESS NOTES
[facility]    Progress Note - [facility]       Date of Admission:  7/17/2024    Today's plan:   - discuss further MM workup with hematology  - consult neurosurgery re: any neurosurgical interventions  - consult IR re T12 biopsy per neurosurgery recs  - request 2nd read for CT aortic survey to assess metastatic burden per neurosurgery recs  - order bone scan    Assessment & Plan   Cara Cool is an 84 year old female admitted on 7/17/24 for worsening non-traumatic back pain. Relevant PMHx of RCC, b/l renal cysts, MGUS, saddle PE with acute cor pulmonale, atrial tachycardia, and paroxysmal afib. Found to have left T12 transverse process lytic lesion consistent with neoplasm, T11 fx, tachycardia (resolved) and junctional rhythm on EKG. Currently, getting worked up for MM and possibly getting T12 biopsy. Hematology, cardiology, neurosurgery, and IR onboard.     # Mid-back pain  # Concern for multiple myeloma  # MGUS  # Elevated SPEP  # Elevated beta 2 glycoprotein  # Elevated serum   Differential diagnosis includes most likely MM (constant back pain rather than aggravated by movement, elevated lab values above that are associated with multiple myeloma, lytic lesions on imaging), metastasis from other malignancy, other bone and tissue cancers.    Interventions  Pain:  - Tylenol 650 mg q8h PRN (not to exceed 2g in one day due to autoimmune hepatitis)  - Lidocaine patch  - Oxycodone 2.5-5 mg q4h PRN  - Hold PTA tramadol as patient takes 50 mg daily - not as effective for bone pain but can consider restarting if above medications not helpful  - Consider additional cancer screening with shared decision making     Workup  - SPEP serum, urine, iCal, UA, kappa and lambda light chain  - Bone scan on 7/19  - Phosphorus  - Daily CBC, CMP  - MRI w wo contrast T & L spine (added L too per neurosx recs)  - Consulted IR for biopsy per neurosx recs   - Other  considerations to discuss with oncology to look for additional bone involvement and mets: Technetium-99 bone scintigraphy, radiographic skeletal survey, whole body CT, whole body MRI, or positron emission tomography scan     Consults  - Hematology, IR, neurosurgery      # History of Afib  # Tachycardia  # Junctional rhythm  # Elevated troponin  History of Afib, tachycardic to 120s-130s on admission, asymptomatic. Multiple EKGs showed junctional rhythm and ST and T-wave abnormalities; however, these abnormalities were not present by last EKG. Troponin plateau at 27. Consulted cardiology who recommended treating with metoprolol if her HR is sustained at 140s. Low concern for ACS due to absence of symptoms and EKG reassuring against ACS. Elevated troponin may be due to demand ischemia with tachycardia.  - Per cards, don't resume digoxin (7/18 digoxin level 0.7, therapeutic) + no need to trend troponin  - Per cards, start metoprolol 2.5mg BID  - Per cards, if elevated rates become more persistent, could trial low dose BB (Lopressor 12.5 mg BID)  - Per cards, follow up 1-2 months (they will place referral)  - Cards will obtain TTE to ensure EF still normal  - Rivaroxaban  20 mg daily    # History of acute saddle PE with acute cor pulmonale  Occurred in 2021. Unknown whether provoked; per chart review may have been provoked after the patient had covid. She did state that she was told to take anticoagulants indefinitely. No SOB or CP though tachycardic throughout admission thus far. CT aortic survey not concerning for PE.   - Rivaroxaban (XARELTO) 20 MG tablet       # Osteoporosis  DXA in 2/2024 diagnosed osteoporosis. Fracture at T11 on CTSee above; patient may have had non-traumatic pathologic fracture either alone or in tandem with another pathologic process.  - Cholecalciferol 800 unit(s) daily     # Acute autoimmune hepatitis   # Cholelithiasis  $ Bilateral multiple renal cysts  Hepatitis diagnosed in 2017. Stable on  azathioprine. Stable LFTs with elevated alk phos, which may be due to bone pathology such as multiple myeloma. Bilateral multiple renal cysts, benign-appearing, seen on abdominal imaging since at least 2021. Stable creatinine with no masses or tenderness on abdominal exam.  - AzaTHIOprine 50 MG tablet daily  - Daily CMP     # Hyperparathyroidism s/p parathyroid adenoma removal  # Hypercalcemia, chronic  Per chart review, calcium elevated since at least 2010, after which she was found to have an elevated PTH which eventually led to discovery of an inferior L parathyroid adenoma which was resected in 2019. Her calcium remains elevated at 10.5 (corrected also 10.5). Unclear whether this is due to ongoing parathyroid pathology and/or additional pathology such as multiple myeloma.   - iCal  - Consider additional hypercalcemia workup     # TIA  # Hypercholesterolemia  TIA occurred in 2013 per chart review. Left her with eye findings that resemble strabismus and moderately constricted pupils at baseline per patient. Otherwise, nonfocal neurological exam.  - Atorvastatin 20 mg daily     # Hypertension  Wnl and stable since admission. Unclear whether she takes individual medications below or combination pill. Pharmacy med rec done.   - HydroCHLOROthiazide (ORETIC) 50 MG tablet    - Spironolactone (ALDACTONE) 50 MG tablet      Osteoarthritis  Tramadol 50 mg daily PTA - patient agreed to try tylenol and oxycodone as there is better evidence for these medications for bone pain than tramadol.  - Hold PTA tramadol     # Constipation  Last BM four days ago.  -Miralax scheduled daily  -Senna scheduled daily     # Tobacco use  Unsure of pack years; consider exploring her smoking history and if she is interested in quitting if applicable.     Diet: Combination Diet Regular Diet Adult    DVT Prophylaxis: DOAC  Lundberg Catheter: Not present  Fluids: None  Lines: PIV    Cardiac Monitoring: ACTIVE order. Indication: junctional rhythm  Code  Status: Full Code           # Hypercalcemia: Highest Ca = 10.5 mg/dL in last 2 days, will monitor as appropriate    # Hypoalbuminemia: Lowest albumin = 3.4 g/dL at 7/18/2024  6:49 AM, will monitor as appropriate  # Drug Induced Coagulation Defect: home medication list includes an anticoagulant medication    # Hypertension: Noted on problem list                        Disposition Plan      Expected Discharge Date: discharge after imaging results and recs from teams involved.                 The patient's care was discussed with the Attending Physician, Dr. Uriostegui, Chief Resident/Fellow, and Patient.    Clara Metcalf MD  [facility]  [facility]  Securely message with [employer] (more info)  Text page via [facility] Paging/Directory   See signed in provider for up to date coverage information  ______________________________________________________________________    Interval History   Pertinent overnight events: no acute events.     Pt reports feeling better. Endorses mid-back pain with movement only. Denies CP, SOB, N/V/D, abdominal pain.     Physical Exam   Vital Signs: Temp: 97.5  F (36.4  C) Temp src: Oral BP: 101/69 Pulse: 77   Resp: 19 SpO2: 100 % O2 Device: Nasal cannula Oxygen Delivery: 2 LPM  Weight: 236 lbs 1.6 oz    Constitutional: awake, alert, cooperative, no apparent distress, and appears stated age  Eyes: Eyes with R eye down and to right, pupils constricted, which patient states is her baseline.   Respiratory: No increased work of breathing, good air exchange, clear to auscultation bilaterally, no crackles or wheezing  Cardiovascular: Regular rate and rhythm, normal S1 and S2, no S3 or S4, and no murmur noted  GI: Non-distended, non-tender      Data     I have personally reviewed the following data over the past 24 hrs:    5.1  \   13.6   / 404     140 102 27.3 (H) /  102 (H)   3.7 23 0.90 \     ALT: <5 AST: 18 AP: 178 (H) TBILI: 0.7   ALB: 3.4  (L) TOT PROTEIN: 7.3 LIPASE: N/A     Trop: 34 (H) BNP: N/A     INR:  2.17 (H) PTT:  39 (H)   D-dimer:  N/A Fibrinogen:  N/A     Ferritin:  N/A % Retic:  N/A LDH:  344 (H)       Imaging results reviewed over the past 24 hrs:   Recent Results (from the past 24 hour(s))   CT Thoracic Spine Reconstructed    Narrative    EXAM: CT THORACIC SPINE RECONSTRUCTED  LOCATION: [facility]  DATE: 7/17/2024    INDICATION: central lower thoracic back pain, reproducible; non traumatic, reproducible  COMPARISON: None.  TECHNIQUE: Routine CT Thoracic Spine without IV contrast. Multiplanar reformats. Dose reduction techniques were used.     FINDINGS:  VERTEBRA: Fracture of T11 with slight vertebral body height loss. There is suggestion of underlying lesion in the left pedicle extending into vertebral bodies and posterior elements as well as into adjacent soft tissues. MRI thoracic spine without and   with contrast recommended; providing patient is MRI compatible. Mild wedging of a few mid thoracic vertebral bodies with mildly exaggerated thoracic kyphosis. Upper left and lower right thoracic curve.    CANAL/FORAMINA: Mild to moderate degenerative changes without significant canal narrowing at any level. Multilevel mild to moderate neural foraminal narrowing.    PARASPINAL: Probable 22 mm nodule posterior aspect left thyroid gland; not well-visualized ultrasound recommended, if not done previously.      Impression    IMPRESSION:  1.  Fracture of T11 with slight vertebral body height loss. There is suggestion of underlying lesion in the left pedicle extending into vertebral bodies and posterior elements as well as into adjacent soft tissues.   2.  MRI thoracic spine without and with contrast recommended; providing patient is MRI compatible.  3.  No definite other fractures.     CT Aortic Survey w Contrast    Narrative    EXAM: CT AORTIC SURVEY W CONTRAST  LOCATION: [facility]  [facility]  DATE: 7/17/2024    INDICATION: back pain worsening x1 week; hx saddle PE, tobacco use  COMPARISON: No prior chest imaging; CT of the abdomen and pelvis with contrast 9/28/2021  TECHNIQUE: CT angiogram chest abdomen pelvis during arterial phase of injection of IV contrast. 2D and 3D MIP reconstructions were performed by the CT technologist. Dose reduction techniques were used.   CONTRAST: 90mL Isovue 370    FINDINGS:   CT ANGIOGRAM CHEST, ABDOMEN, AND PELVIS: The main pulmonary artery is normal caliber. There are no pulmonary artery filling defects. The pulmonary arteries taper normally as they extend towards the pleura. Minimal calcification of the aortic root.   Sinotubular junction is preserved. No thoracic aortic aneurysm. 2 vessel arch anatomy. Mixed attenuation plaque is present in the arch and descending aorta. No acute aortic syndrome.    Mixed attenuation plaque is present in the normal caliber abdominal aorta and in the common iliac arteries. The and celiac axis, SMA, and ANNETTE are patent. No stenoses of the origins or proximal segments of the renal arteries.    LUNGS AND PLEURA: Upper lung predominant emphysema. There is a circumscribed, polygonal nodule along the lateral costal pleura of the right upper lobe measuring 5-6 mm (series 4, image 98) which has typical features of an intrapulmonary lymph node. No   other lung nodules. Trachea and central airways are patent. No bronchial wall thickening or bronchiectasis. No pleural effusions.    MEDIASTINUM: Cardiac chambers are normal in size. No pericardial effusion. No enlarged mediastinal or hilar lymph nodes. The esophagus is decompressed. Heterogeneous thyroid gland, but no actionable thyroid nodule.    CORONARY ARTERY CALCIFICATION: Severe.    HEPATOBILIARY: Slightly lobulated liver contour. Small calcified stones layer into the fundus of the gallbladder. No gallbladder wall thickening or pericholecystic inflammation. No bile  duct enlargement.    PANCREAS: Normal.    SPLEEN: Spleen is normal in size. There are several embolic coils along the course of the splenic artery in the left upper quadrant.    ADRENAL GLANDS: Normal.    KIDNEYS/BLADDER: No significant mass, stones, or hydronephrosis. There are simple or benign cysts. No follow up is needed.    BOWEL: Mild diverticulosis of the colon in the left lower quadrant. No dilated bowel or bowel wall thickening.    LYMPH NODES: There are no enlarged lymph nodes in the abdomen or pelvis.    PELVIC ORGANS: Status post hysterectomy. No pelvic mass.    MUSCULOSKELETAL: Generalized bone demineralization. There is a lytic lesion related to the left transverse process of T12 (series 4, image 225). Status post left hip arthroplasty. No periprosthetic fractures. Fibrofatty thickening deep to the latissimus   muscles bilaterally consistent with mild elastofibroma dorsi. Small fat-containing umbilical hernia. Previous abdominoplasty.      Impression    IMPRESSION:    1.  No acute pulmonary embolism or acute aortic syndrome.  2.  Focal lytic lesion associated with the left T12 transverse process consistent with neoplasm, either representing a myelomatous lesion or lytic metastasis.  3.  Diverticulosis of the distal colon but no diverticulitis.  4.  Cholelithiasis.

## 2024-07-18 NOTE — PROGRESS NOTES
"Brief Note  Assessed patient at bedside in light of persistent tachycardia and EKG still showing junctional rhythm. Patient was sleeping calmly with monitor reading of HR 120s prior to provider waking her. Patient states she is unclear of her complete heart history, but does state she was on \"something\" for her heart previously. Patient states feels currently fine. Patient denies chest pain. Denies SOB. Denies    General: Resting on the bed.  Head: No obvious trauma to head.  Ears, Nose, Throat:  External ears normal.    Eyes:  Conjunctivae clear.    CV: Tachycardic rate and  regular rhythm.  No murmurs.      Respiratory: Effort normal and breath sounds normal.  No wheezing or crackles.   Neuro: Alert. Normal mentation.  Normal speech.    Psych: Normal mood and affect. Behavior is normal.      Assessment/Plan:  #Tachycardia  Patient has tachycardia of unclear etiology at this time although appears most likely either related to atrial fibrillation, junctional rhythm or AVNRT. Thus, will repeat EKG   - STAT repeat EKG  - transfer for telemetry (currently accepted to 6MS and being transferred shortly per bedside RN report)    #Elevated troponin  Appears likely elevated troponin is demand ischemia not ACS. However up trended troponin of 35 (at 0026) from 26 (at 1914) will repeat ACS rule out.  - STAT troponin  - STAT repeat EKG  - anticipate discuss case with cardiology if repeat troponin is still up trended     Mayra Nicolas MD  7/18/2024  1:50 AM      Brief Update  Patient case was discussed with cardiologist over the phone by Dr. Hammonds. Cardiology recommends not starting metoprolol at this time unless HR>140 or hemodynamic instability  - formal cardiology consult placed  - continue on telemetry  7/18/2024  3:01 AM    Brief Update  Cardiology discussed case further and does not think her EKG is showing Afib with RVR but rather junctional rhythm. They recommend starting 12.5 mg po metoprolol if she has sustained HR " 140s-150s and if that is not effective 2.5 mg IV metoprolol. If she becomes hemodynamically unstable they recommend not giving metoprolol and calling them to consider cardioversion.

## 2024-07-18 NOTE — H&P
[facility]    History and Physical - [facility] Service       Date of Admission:  7/17/2024    Assessment & Plan      Cara Cool is a 84 year old female who presents to the ED with complaint of back pain. Past medical history notable for saddle PE with acute cor pulmonale, atrial tachycardia, paroxysmal atrial fibrillation, cholelithiasis, bilateral renal cysts, hypercholesterolemia, hypertension, hyperparathyroidism, obesity, osteoarthritis, TIA, tobacco use.  She is admitted for workup of worsening, nontraumatic back pain and pain management.     # Mid-back pain  # Concern for multiple myeloma  # MGUS  # Elevated SPEP  # Elevated beta 2 glycoprotein  # Elevated serum   Patient with a week of constant mid-back pain with no history of trauma. Had elevated labs above in 1/2024--though asymptomatic. She was found to have a very small (0.8 g) IgM kappa monoclonal protein during a workup for autoimmune hepatitis (see below) with plan to follow up in 6 months and repeat labs (repeat labs not yet drawn). Differential diagnosis includes most likely multiple myeloma (constant back pain rather than aggravated by movement, elevated lab values above that are associated with multiple myeloma, lytic lesions on imaging), other less likely traumatic fracture given no trauma history (though could still be pathological fracture in context of osteoporosis), metastasis from other malignancy (reassuring lung nodule on CT resembling a lymph node though smokes tobacco, negative breast cancer screen in 2016, no other evidence on chest/abdomen/pelvic imaging of malignancy), other bone and tissue cancers.    Interventions  Pain:  - Tylenol 650 mg q8h (not to exceed 2g in one day due to autoimmune hepatitis)  - Lidocaine patch  - Oxycodone 2.5-5 mg q4h  - Hold PTA tramadol as patient takes 50 mg daily - not as effective for bone pain but can consider restarting if above  medications not helpful  - Consider additional cancer screening with shared decision making    Workup  - SPEP serum, urine, iCal, UA, kappa and lambda light chain  - LDH  - Phosphorus  - Daily CBC, CMP  - MRI w wo contrast T spine  - Other considerations to discuss with oncology to look for additional bone involvement and mets: Technetium-99 bone scintigraphy, radiographic skeletal survey, whole body CT, whole body MRI, or positron emission tomography scan    Consults  - Oncology    # History of Afib  # Tachycardia  # Junctional rhythm  # Elevated troponin  History of Afib,  though  tachycardic to 120s-130s since admission with intermittent decreases to 80s. Patient denies CP, SOB, dizziness, lightheadedness. Multiple EKGs showing junctional rhythm and ST and T-wave abnormalities; however, these abnormalities were not present by last EKG. Troponin initially in 20s, peaked, reinier again to 30s, peaked again. Consulted cardiology who recommended treating with metoprolol if her HR is sustained at 140s-140s. Low concern for ACS due to absence of symptoms and EKG reassuring against ACS. Additional differential for tachycardia with junctional rhythm is digoxin toxicity. Elevated troponin may be due to demand ischemia with tachycardia. Cardiology consulted to assist with investigating etiology and management.  - Digoxin (LANOXIN) 125 MCG tablet  - Rivaroxaban  20 mg daily  - Digoxin level  - Cardiology consult    # History of acute saddle pulmonary embolism with acute cor pulmonale  Occurred in 2021. Unknown whether provoked; per chart review may have been provoked after the patient had covid. She did state that she was told to take anticoagulants indefinitely. No SOB or CP though tachycardic throughout admission thus far. CT aortic survey not concerning for PE.   - Rivaroxaban (XARELTO) 20 MG tablet      # Osteoporosis  DXA in 2/2024 diagnosed osteoporosis. Fracture at T11 on CTSee above; patient may have had non-traumatic  pathologic fracture either alone or in tandem with another pathologic process.  - Cholecalciferol 800 unit(s) daily    # Acute autoimmune hepatitis   # Cholelithiasis  $ Bilateral multiple renal cysts  Hepatitis diagnosed in 2017. Stable on azathioprine. Stable LFTs with elevated alk phos, which may be due to bone pathology such as multiple myeloma. Bilateral multiple renal cysts, benign-appearing, seen on abdominal imaging since at least 2021. Stable creatinine with no masses or tenderness on abdominal exam.  - AzaTHIOprine 50 MG tablet daily  - Daily CMP    # Hyperparathyroidism s/p parathyroid adenoma removal  # Hypercalcemia, chronic  Per chart review, calcium elevated since at least 2010, after which she was found to have an elevated PTH which eventually led to discovery of an inferior L parathyroid adenoma which was resected in 2019. Her calcium remains elevated at 10.5 (corrected also 10.5). Unclear whether this is due to ongoing parathyroid pathology and/or additional pathology such as multiple myeloma.   - iCal  - Consider additional hypercalcemia workup    # TIA  # Hypercholesterolemia  TIA occurred in 2013 per chart review. Left her with eye findings that resemble strabismus and moderately constricted pupils at baseline per patient. Otherwise, nonfocal neurological exam.  - Atorvastatin 20 mg daily    # Hypertension  Wnl and stable since admission. Unclear whether she takes individual medications below or combination pill. Patient did not know her med list exactly; used list in home meds in Epic. Took all meds 7/17 AM, needs to be resolved ASA 7/18. Consult pharmacy as below.  - Clarify with patient which pill she takes  - HydroCHLOROthiazide (ORETIC) 50 MG tablet    - Spironolactone (ALDACTONE) 50 MG tablet   - Pharmacy med history consult    Osteoarthritis  Tramadol 50 mg daily PTA - patient agreed to try tylenol and oxycodone as there is better evidence for these medications for bone pain than  tramadol.  - Hold PTA tramadol    # Constipation  Last BM four days ago.  -Miralax scheduled daily  -Senna scheduled daily    # Tobacco use  Unsure of pack years; consider exploring her smoking history and if she is interested in quitting if applicable.       Diet:  Regular  DVT Prophylaxis: DOAC  Lundberg Catheter: Not present  Fluids: None  Lines: None     Cardiac Monitoring: tele  Code Status:  Full    Clinically Significant Risk Factors Present on Admission           # Hypercalcemia: Highest Ca = 10.5 mg/dL in last 2 days, will monitor as appropriate     # Coagulation Defect: INR = 2.17 (Ref range: 0.85 - 1.15) and/or PTT = 39 Seconds (Ref range: 22 - 38 Seconds), will monitor for bleeding    # Hypertension: Noted on problem list                        Disposition Plan      Expected Discharge Date: 07/18/2024                The patient's care was discussed with the Attending Physician, Dr. Barker .      Luc Hammonds MD  [facility]  [facility]  Securely message with Vocera (more info)  Text page via [facility] Paging/Directory   See signed in provider for up to date coverage information  ______________________________________________________________________    Chief Complaint     Back pain    History is obtained from the patient    History of Present Illness     Cara Cool is a 84 year old female who presents to the ED with complaint of back pain. Past medical history notable for saddle PE with acute cor pulmonale, atrial tachycardia, paroxysmal atrial fibrillation, cholelithiasis, bilateral renal cysts, hypercholesterolemia, hypertension, hyperparathyroidism, obesity, osteoarthritis, TIA, tobacco use.     Pain began one week ago and progressively worsened in severity. Denies trauma heavy lifting or other exacerbating activities. Pain is in central mid back and radiatese bilaterally. No history of back pain that feels like this. Denies sweating,  nausea, vomiting, changes in BM or urinary symptoms. No chest pain, palpitations, shortness of breath. No numbness tingling or weakness. No bowel or bladder incontinence.    Patient went to oncology 1/2024 where she was found to have MGUS. Labs as above were drawn to work up for multiple myeloma.    ED Course  -Vitals: tachy to 140, otherwise wnl  -Labs: CMP hypercalcemia to 10.5 (corrected 10.5), AG 16, Total protein 8.4, alk phos 204, Trop 27 --> 26, INR 2.17, Ptt 39  -EKG: Qtc 457, accelerated junctional rhythm with retrograde conduction, nonspecific ST and T wave abnormalities  -Imaging: CT aortic survey with contrast: no evidence of PE or acute aortic pathology.  **focal lytic lesion associated w left T12 transverse process consistent with neoplasm, either represesnting a myelomatous lesion or nabil metastasis**, cholelithiasis. CT T-spine reconstructed: T11 fracture w slight vertebral body height loss, possible underlying lesion in L pedicle estending into vertebral bodies and posterir elements and adjacent soft tissues  **recommended MRI T spine w and wo contrast recommended**    Interventions:  IV dilaudid, 0.5 mg  Po oxycodone 5 mg  1L NS bolus      Past Medical History    Past Medical History:   Diagnosis Date    Arthritis     Hypertension        Past Surgical History   Past Surgical History:   Procedure Laterality Date    ORTHOPEDIC SURGERY      hip and knee replacement surgery       Prior to Admission Medications   None           Physical Exam   Vital Signs: Temp: 97.6  F (36.4  C) Temp src: Oral BP: 132/80 Pulse: 84   Resp: 18 SpO2: 93 % O2 Device: None (Room air)    Weight: 233 lbs 3.2 oz  Constitutional:       General: She is not in acute distress.     Appearance: Normal appearance. She is not ill-appearing, toxic-appearing or diaphoretic.   HENT:      Mouth/Throat:      Mouth: Mucous membranes are dry.      Pharynx: Oropharynx is clear.   Cardiovascular:      Rate and Rhythm: Regular rhythm.  Tachycardia present.      Pulses: Normal pulses.   Pulmonary:      Effort: Pulmonary effort is normal. No respiratory distress.      Breath sounds: Normal breath sounds. No stridor. No wheezing or rales.   Abdominal:      General: Abdomen is flat. Bowel sounds are normal. There is no distension.      Palpations: Abdomen is soft.      Tenderness: There is no abdominal tenderness. There is no guarding or rebound.   Musculoskeletal:      Cervical back: Normal range of motion.      Thoracic back: Tenderness and bony tenderness present. No spasms.      Lumbar back: Normal. No tenderness or bony tenderness.        Back:  Neurological:      General: No focal deficit present.      Mental Status: She is alert and oriented to person, place, and time. Mental status is at baseline.  Eyes with R eye down and to right, pupils constricted, which patient states is her baseline.   Psychiatric:         Mood and Affect: Mood normal.         Behavior: Behavior normal.

## 2024-07-18 NOTE — PROGRESS NOTES
/62 (BP Location: Right arm, Patient Position: Supine)   Pulse (!) 124   Temp 97.5  F (36.4  C) (Oral)   Resp 18   Wt 105.8 kg (233 lb 3.2 oz)   SpO2 92%     Arrived to 5 ortho from  ED. Tachycardiac 125-140s. 1 LPM while sleeping. Minimal pain on the left lower back.       Notified by MD patient needs tele after re-evaluating EKG Results. Charge nurse made aware. Patient will be transferring to [address] [place] [address] for tele monitoring.

## 2024-07-18 NOTE — PROGRESS NOTES
Major Shift Events:  Arrived to unit from 5 ortho at 0210. 2 RN skin check done by RN Celeste Ruiz and RN Sue Burch. Skin intact except for infiltrated IV site on right forearm from ED, denies pain at site.   A&Ox4, pain in back controlled w/ PRN tylenol and oxycodone. Tachycardic, normotensive. Denies chest pain and SOB. Lung sounds clear. Possible murmur detected. Stated she is constipated and has not had a bowel movement since 7/14, given PRN senna. Continent, up to bathroom with SBA and uses her cane from home.  Provider notified at 0245 about sustained HR in the 120's. Per provider, no intervention necessary at this time and to notify team if HR is sustained 140's. Cardiology consulted.    Plan: Continue observation. Thoracic spine MRI in morning.    For vital signs and complete assessments, please see documentation flowsheets.

## 2024-07-18 NOTE — PHARMACY-ADMISSION MEDICATION HISTORY
Pharmacist Admission Medication History    Admission medication history is complete. The information provided in this note is only as accurate as the sources available at the time of the update.    Information Source(s): [facility]Everywhere[facility]    Pertinent Information:   Patient was a good historian, knew most medications/dosing. She reports not having taken any medications in past few days. Takes alendronate on Sundays but did not take it this past Sunday.    Changes made to PTA medication list:  Added: polyethylene glycol (per patient)  Deleted: duplicate spironolactone, vitamin D 10 mcg, spironolactone-hydrochlorothiazide tablet,   Changed:   Added directions to rivaroxaban, azathioprine, alendronate, vitamin D 25 mcg tablet (per patient, Surescripts, [facility] Care Everywhere)     Allergies reviewed with patient and updates made in EHR: yes    Medication History Completed By: Rosa Isela [facility] 7/18/2024 8:26 AM      PTA Med List   Medication Sig Last Dose    alendronate (FOSAMAX) 70 MG tablet Take 70 mg by mouth every 7 days On Sundays Past Month    atorvastatin (LIPITOR) 20 MG tablet Take 20 mg by mouth daily Past Week    azaTHIOprine (IMURAN) 50 MG tablet Take 75 mg by mouth daily Past Week    digoxin (LANOXIN) 125 MCG tablet Take 125 mcg by mouth daily Past Week    hydrochlorothiazide (HYDRODIURIL) 50 MG tablet Take 50 mg by mouth daily Past Week    polyethylene glycol (MIRALAX) 17 GM/Dose powder Take 1 Capful by mouth daily as needed for constipation Past Week    rivaroxaban ANTICOAGULANT (XARELTO) 20 MG TABS tablet Take 20 mg by mouth daily (with dinner) Past Week    spironolactone (ALDACTONE) 50 MG tablet Take 50 mg by mouth daily Past Week    Vitamin D3 (VITAMIN D-1000 MAX ST) 25 mcg (1000 units) tablet Take 1,000 Units by mouth daily Past Week

## 2024-07-18 NOTE — PLAN OF CARE
Goal Outcome Evaluation:      Plan of Care Reviewed With: patient    Overall Patient Progress: no change    Outcome Evaluation: Patient A&O x4. Able to make needs known and uses call light appropriately. Assist x1 with walker and gait belt. Continent of bowel and bladder. R PIV saline locked. Pain managed by prn oxycodone. No acute events this shift. Continue with plan of care.

## 2024-07-18 NOTE — CONSULTS
Cardiology Inpatient Consultation  July 18, 2024    Reason for Consult:  A cardiology consult was requested by Dr. Barker from the [place]'s service to provide clinical guidance regarding intermittent tachycardia of unclear etiology.    Assessment and Plan:   # Atrial tachycardia, likely AVNRT  # History of paroxysmal atrial fibrillation  # Hx saddle PE 2021  # Elevated troponin  Patient states she was diagnosed with AF/AT when diagnosed with COVID-19 in 2021. She states that she has been on digoxin since then and has never experienced any other cardiac symptoms including palpitations, SOB, dizziness, syncope or chest pain. She has overall been tolerating her medications, including anticoagulation, well without side effects.    Patient was admitted to hospital 7/17 with c/c of back pain with concern for multiple myeloma based on lab abnormalities. Oncology has been consulted for this reason. Other pertinent labs/imaging includes CT aorta revealed no PE, focal lytic lesion with left T12 transverse process consistent with neoplasm. Troponin elevated but flat, likely due to tachycardia.  Overnight 7/17, RN noticed elevated HR's to the 120's. Cardiology consult was placed for this persistent tachycardia. Patient remained HDS with BP > 100 and was asymptomatic (per chart review appears she was awoken by staff and she had no complaints/palpitations). On review of her EKG's, it appears that she was in atrial tachycardia. This does not appear to be atrial fibrillation as the QRS complexes are at a regular interval. This does not appear to be sinus tachycardia due to lack of P wave prior to each QRS complex. This does not appear to be a ventricular complex due to narrow QRS complexes. There were no saved telemetry strips to review  On exam, she denies any symptoms during her tachycardia event. States she was diagnosed with AF in 2021 and denies ever having recurrence of symptoms.     Recommendation:  - Unclear rational why  patient was on digoxin PTA, patient does not believe she has tried other rate control options (Diltiazem, BB). Will obtain TTE to ensure EF still normal (previously normal EF in 2021 with RV dilation in setting of PE and cor pulmonale, no follow up TTE available). Would recommend stopping digoxin and starting Lopressor 12.5 mg BID  # No need to continue to trend troponin  - Asymptomatic, self-limiting symptoms, no further treatment indicated. She has a history of atrial tachycardia and these arrhythmias are likely triggered by acute illness/lab abnormalities  - If elevated rates become more persistent, could trial low dose BB (Lopressor 12.5 mg BID)  - Continue PTA Xarelto  - Cardiology follow up 1-2 months (will place referral)    Patient discussed with Dr. Finch, who agrees with above plan.    Thank you for consulting the cardiovascular services at the [facility]. Please do not hesitate to call us with any questions. We will sign off pending stable echocardiogram findings. If abnormal we will continue to follow.     Acacia MONTGOMERY, CNP  [facility] Cardiology Consult Team  [phone]    HPI:   Cara Cool is a 84 year old female with past medical history of saddle PE with acute cor pulmonale, atrial tachycardia, paroxysmal atrial fibrillation, cholelithiasis, bilateral renal cysts, hypercholesterolemia, hypertension, hyperparathyroidism, obesity, osteoarthritis, TIA, tobacco use.  She is admitted for workup of worsening, nontraumatic back pain and pain management.     At the time of interview, the patient denies chest pain, dyspnea at rest or with exertion, orthopnea, PND, palpitations, lightheadedness, or syncope.     Review of Systems:    Complete review of systems was performed and negative except per HPI.    PMH:  Past Medical History:   Diagnosis Date    Arthritis     Hypertension      Active Problems:  Patient Active Problem List    Diagnosis Date Noted    Back pain  07/17/2024     Priority: Medium    Lytic bone lesions on xray 07/17/2024     Priority: Medium    Closed fracture of eleventh thoracic vertebra, unspecified fracture morphology, initial encounter (H) 07/17/2024     Priority: Medium    Paroxysmal atrial fibrillation (H) 12/04/2022     Priority: Medium     Formatting of this note might be different from the original.   Occured when patient had COVID and a PE      Hx of pulmonary embolus 12/04/2022     Priority: Medium     Formatting of this note might be different from the original.   When pt had COVID, felt to be provoked      Supraventricular tachycardia (H24) 10/29/2021     Priority: Medium    Acute saddle pulmonary embolism with acute cor pulmonale (H) 10/08/2021     Priority: Medium    Shock (H) 10/08/2021     Priority: Medium    Obesity, Class III, BMI 40-49.9 (morbid obesity) (H) 05/22/2021     Priority: Medium    Atrial tachycardia (H24) 05/20/2021     Priority: Medium    Hyperparathyroidism (H24) 11/12/2019     Priority: Medium    Renal mass 05/08/2018     Priority: Medium     Formatting of this note might be different from the original.   Added automatically from request for surgery [id number]  Formatting of this note might be different from the original.   Formatting of this note might be different from the original.   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   Noticed on admission for hip replacement on 3/13/13.  Was given IV metoprolol x 2 and converted  back to sinus rhythm.  Had another short run the next day. Was started on 12.5 bid of metoprolol for prevention.  Cardiology saw pt and stated that if recurrences remain a problem in the future she may be a candidate for radiofrequency ablation.  Formatting of this note might be different from the original.   Formatting of this note might be different from the original.   Noticed on admission for hip replacement on 3/13/13.  Was given IV metoprolol x 2 and converted back to sinus rhythm.  Had another short run the next day. Was started on 12.5 bid of metoprolol for prevention.  Cardiology saw pt and stated that if recurrences remain a problem in the future she may be a candidate for radiofrequency ablation.      S/P hip replacement 03/12/2013     Priority: Medium     Formatting of this note might be different from the original.   3/2013      Gallbladder polyp 04/30/2010     Priority: Medium    Cholelithiasis 04/21/2010     Priority: Medium    Constipation 05/30/2008     Priority: Medium    Varicose veins of legs 05/30/2008     Priority: Medium    Allergic rhinitis 08/16/2007     Priority: Medium    Hypercholesterolemia 08/16/2007     Priority: Medium    Hypertension 08/16/2007     Priority: Medium    Osteoarthritis of knee 08/16/2007     Priority: Medium    Retinal artery occlusion 08/16/2007     Priority: Medium    Tobacco abuse 08/16/2007     Priority: Medium     Social History:  Social History     Tobacco Use    Smoking status: Never    Smokeless tobacco: Never   Substance Use Topics    Alcohol use: Not Currently    Drug use: Never     Family History:  History reviewed. No pertinent family history.    Medications:  Current Facility-Administered Medications   Medication Dose Route Frequency Provider Last Rate Last Admin    atorvastatin (LIPITOR) tablet 20 mg  20 mg Oral Daily Luc Hammonds MD        azaTHIOprine (IMURAN) tablet 50 mg  50 mg Oral Daily Luc Hammonds MD        cholecalciferol (VITAMIN  D3) tablet 20 mcg  20 mcg Oral Daily Luc Hammonds MD        [Held by provider] digoxin (LANOXIN) tablet 125 mcg  125 mcg Oral Daily Luc Hammonds MD        hydrochlorothiazide (HYDRODIURIL) tablet 50 mg  50 mg Oral Daily Luc Hammonds MD        polyethylene glycol (MIRALAX) Packet 17 g  17 g Oral Daily Luc Hammonds MD        rivaroxaban ANTICOAGULANT (XARELTO) tablet 20 mg  20 mg Oral Daily with supper Luc Hammonds MD        sennosides (SENOKOT) tablet 8.6 mg  8.6 mg Oral BID Luc Hammonds MD        sodium chloride (PF) 0.9% PF flush 3 mL  3 mL Intracatheter Q8H Luc Hammonds MD        spironolactone (ALDACTONE) tablet 50 mg  50 mg Oral Daily Luc Hammonds MD        Vitamin D3 (CHOLECALCIFEROL) tablet 25 mcg  25 mcg Oral Daily Luc Hammonds MD           Current Facility-Administered Medications   Medication Dose Route Frequency Provider Last Rate Last Admin       Physical Exam:  Temp:  [96.8  F (36  C)-97.9  F (36.6  C)] 96.8  F (36  C)  Pulse:  [] 126  Resp:  [10-30] 20  BP: (103-132)/(62-80) 116/77  SpO2:  [91 %-98 %] 92 %    Intake/Output Summary (Last 24 hours) at 7/18/2024 0704  Last data filed at 7/18/2024 0241  Gross per 24 hour   Intake 240 ml   Output --   Net 240 ml     GEN: pleasant, no acute distress  HEENT: no icterus  CV: RRR, normal s1/s2, no murmurs/rubs/s3/s4, no heave. JVP not elevated.   CHEST: clear to ausculation bilaterally, no rales or wheezing  ABD: soft, non-tender, normal active bowel sounds  EXTR: No clubbing, cyanosis or edema.   NEURO: alert oriented, speech fluent/appropriate, motor grossly nonfocal    Diagnostics:  All labs and imaging were reviewed, of note:    CMP  Recent Labs   Lab 07/18/24  0026 07/17/24  1914 07/17/24  1804   NA  --   --  138   POTASSIUM  --   --  4.2   CHLORIDE  --   --  98   CO2  --   --  24   ANIONGAP  --   --  16*   GLC  --   --  103*   BUN  --   --  29.2*   CR  --  0.89 0.86  "  GFRESTIMATED  --  64 67   TREVOR  --   --  10.5*   MAG  --   --  1.7   PHOS 3.2  --   --    PROTTOTAL  --   --  8.4*   ALBUMIN  --   --  4.0   BILITOTAL  --   --  0.8   ALKPHOS  --   --  204*   AST  --   --  25   ALT  --   --  6     CBC  Recent Labs   Lab 07/17/24  1804   WBC 7.6   RBC 4.82   HGB 14.4   HCT 43.2   MCV 90   MCH 29.9   MCHC 33.3   RDW 14.9        INR  Recent Labs   Lab 07/17/24  1914   INR 2.17*     Arterial Blood GasNo lab results found in last 7 days.    No results found for: \"TROPI\", \"TROPONIN\", \"TROPR\", \"TROPN\"      EKG 7/17/24:      Transthoracic echocardiogram 10/11/21:   Final Impressions:   Limited Echocardiogram performed    1. Technically limited exam.    2. Normal LV size, mildly increased wall thickness, hyperdynamic global systolic function with an estimated EF of > 75%.    3. Right ventricular cavity size is not well visualized, global systolic RV function is not well visualized. RV is not well visualized compared to the prior study of 10/8/21    4. Moderate septal left ventricular hypertrophy.    5. Echo contrast was administrered to enhance visualization of all left ventricular segments.    6. The aortic valve is trileaflet and sclerotic.     Medical Decision Making       60 MINUTES SPENT BY ME on the date of service doing chart review, history, exam, documentation & further activities per the note.       "

## 2024-07-18 NOTE — PROGRESS NOTES
"   07/18/24 6748   Appointment Info   Signing Clinician's Name / Credentials (OT) Yasmeen LyOTR/L   Rehab Comments (OT) spine prec.   Living Environment   People in Home alone   Current Living Arrangements house   Home Accessibility stairs to enter home;stairs within home   Number of Stairs, Main Entrance 6   Stair Railings, Main Entrance railings on both sides of stairs   Number of Stairs, Within Home, Primary   (flight to basement with railing to laundry)   Stair Railings, Within Home, Primary railings safe and in good condition   Transportation Anticipated health plan transportation;family or friend will provide  (uses metro mobility)   Living Environment Comments pt. lives alone multi level home, has tub/shower combo   Self-Care   Usual Activity Tolerance moderate   Current Activity Tolerance fair   Regular Exercise No   Equipment Currently Used at Home cane, straight;raised toilet seat;shower chair;grab bar, tub/shower   Fall history within last six months no   Activity/Exercise/Self-Care Comment indep. with BADLs, amb. with SEC   Instrumental Activities of Daily Living (IADL)   Previous Responsibilities meal prep;housekeeping;laundry;shopping;medication management;finances   IADL Comments does online shopping, uses metro mob., has A with yardwork   General Information   Onset of Illness/Injury or Date of Surgery 07/17/24   Referring Physician Luc Hammonds MD   Patient/Family Therapy Goal Statement (OT) to return home, indep.   Additional Occupational Profile Info/Pertinent History of Current Problem Per  chart \"84 year old presenting with spinal fracture, most likely pathological in the setting of a lytic lesion.  Appreciate Heme/Onc.  Will ask Neurosurgery about next steps, precautions and possible biopsy\"   Existing Precautions/Restrictions spinal;fall   General Observations and Info act. orders; up ad beverly; rec. up with A   Cognitive Status Examination   Orientation Status orientation to person, " place and time   Pain Assessment   Patient Currently in Pain Yes, see Vital Sign flowsheet  (back pain)   Range of Motion Comprehensive   General Range of Motion bilateral upper extremity ROM WFL   Strength Comprehensive (MMT)   Comment, General Manual Muscle Testing (MMT) Assessment BUE str. WFL   Bathing Assessment/Intervention   [place] Level (Bathing) minimum assist (75% patient effort);moderate assist (50% patient effort)   Comment, (Bathing) per clinical judgement   Upper Body Dressing Assessment/Training   [place] Level (Upper Body Dressing) set up;minimum assist (75% patient effort)   Lower Body Dressing Assessment/Training   [place] Level (Lower Body Dressing) moderate assist (50% patient effort)   Grooming Assessment/Training   [place] Level (Grooming) set up;contact guard assist   Toileting   [place] Level (Toileting) minimum assist (75% patient effort)   Clinical Impression   Criteria for Skilled Therapeutic Interventions Met (OT) Yes, treatment indicated   OT Diagnosis impaired ADLs/mobility   Influenced by the following impairments back pain, medcial dx.   OT Problem List-Impairments impacting ADL activity tolerance impaired;balance;pain   ADL comments/analysis impaired ADls/mobility   Assessment of Occupational Performance 3-5 Performance Deficits   Identified Performance Deficits dressing, toileting, bathing, home mgmt.   Planned Therapy Interventions (OT) ADL retraining;strengthening;transfer training;home program guidelines;progressive activity/exercise   Clinical Decision Making Complexity (OT) problem focused assessment/low complexity   Risk & Benefits of therapy have been explained evaluation/treatment results reviewed;care plan/treatment goals reviewed;risks/benefits reviewed;current/potential barriers reviewed;participants voiced agreement with care plan;participants included;patient   OT Total Evaluation Time   OT Eval, Low Complexity Minutes (43760) 10   OT Goals    Therapy Frequency (OT) 5 times/week   OT Predicted Duration/Target Date for Goal Attainment 08/08/24   OT Discharge Planning   OT Plan OT PLAN: LB dressing, standing annel.; g/h at sink, toileting (ADLs with use of prev. spine prec.), A.E. training prn, tub/shower transf.   OT Discharge Recommendation (DC Rec) home with assist;home with home care occupational therapy   OT Rationale for DC Rec pt. doing well pending cont. progress and ability of family to A anticipate pt. will be able to d/c home with A.   OT Brief overview of current status min/SBA with BADL and functional mob. amb. with SEC   Total Session Time   Total Session Time (sum of timed and untimed services) 10

## 2024-07-18 NOTE — UTILIZATION REVIEW
Admission Status; Secondary Review Determination       Under the authority of the Utilization Management Committee, the utilization review process indicated a secondary review on the above patient. The review outcome is based on review of the medical records, discussions with staff, and applying clinical experience noted on the date of the review.     (x) Inpatient Status Appropriate - This patient's medical care is consistent with medical management for inpatient care and reasonable inpatient medical practice.     RATIONALE FOR DETERMINATION   84-year-old female with history of paroxysmal atrial fibrillation, atrial tachycardia, hypertension, history of tobacco use who presented for care on 7/17/2024 for worsening of mid back pain over the past week.  No trauma or falls.  In the emergency department tachycardia to 140.  Elevated calcium at 10.5.    Imaging obtained includes a CT which showed a focal lytic lesion with fracture of T11 and slight vertebral body height loss.  Admitted for management of pathologic bone fracture.  She has required opiates IV and p.o. formulations for pain management.    There are multiple consultants involved at this time including neurosurgery, interventional radiology, oncology, cardiology.  Awaiting an MRI with and without contrast of the thoracic and lumbar spine.  Anticipate need for interventional radiology involvement to obtain a biopsy.    At this time, with the information available to the attending physician, more than 2 nights hospital complex care was anticipated, based on patient risk of adverse outcome if treated as outpatient and complex care required. Inpatient admission is appropriate based on the Medicare guidelines.     This document was produced using voice recognition software.    The information on this document is developed by the utilization review team in order for the business office to ensure compliance. This only denotes the appropriateness of proper admission  status and does not reflect the quality of care rendered.   The definitions of Inpatient Status and Observation Status used in making the determination above are those provided in the CMS Coverage Manual, Chapter 1 and Chapter 6, section 70.4.     Sincerely,   Cindi Chowdhury MD  Utilization Review  Physician Advisor  [facility].

## 2024-07-18 NOTE — PLAN OF CARE
Goal Outcome Evaluation:      Plan of Care Reviewed With: patient    Overall Patient Progress: no changeOverall Patient Progress: no change    Outcome Evaluation: patient has been transferred to [address] [place] [address]

## 2024-07-18 NOTE — CONSULTS
[facility]         NEUROSURGERY CONSULTATION    This consultation was requested by Dr. 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 Previously found to have IgM Kappa monoclonal protein. CT C/A/P was obtained as a part of the work-up, which showed a lytic lesion on T11 associated with compression fracture with minimal height loss. There is also a possible second lesion at L4, which was present on previous imaging in 2021. At the time of our evaluation, patient does not complain of back pain. She took her Eliquis on Tuesday prior to presenting to the hospital yesterday. Neurosurgery was consulted for activity recommendations and consideration for biopsy.      ROS: 10 point ROS of systems including Constitutional, Eyes, Respiratory, Cardiovascular, Gastroenterology, Genitourinary, Integumentary, Muscularskeletal, Psychiatric were all negative except for pertinent positives noted in my HPI.    Physical exam:   Blood pressure 139/69, pulse 77, temperature 98.6  F (37  C), temperature source Oral, resp. rate 20, weight 107.1 kg (236 lb 1.6 oz), SpO2 100%.  General: NAD   PULM: Breathing comfortably on room air  MSK: Tenderness to palpation on the lower back   NEUROLOGIC:  -- Awake and alert   -- Follows commands briskly    Cranial Nerves:  -- L eye deviated laterally (baseline per patient)  -- Face symmetrical, tongue midline  --  Sensory V1-V3 intact bilaterally  -- Palate elevates symmetrically, uvula midline  -- Hearing grossly intact bilat  -- Trapezii 5/5 strength bilat symmetric    Motor:  Normal bulk / tone; no tremor, rigidity, or bradykinesia.  No muscle wasting or fasciculations  No Pronator Drift     Delt Bi Tri Hand Flexion/  Extension Iliopsoas Quadriceps Hamstrings Tibialis Anterior Gastroc    C5 C6 C7 C8/T1 L2 L3 L4-S1 L4 S1   R 5 5 5 5 5 5 5 5 5   L 5 5 5 5 5 5 5 5 5     Sensory:  intact to LT x 4 extremities    Reflexes: no clonus       Bi Tri BR Yue Pat Ach     C5-6 C7-8 C6 UMN L2-4 S1   R 2+ 2+ 2+ Norm 2+ 2+   L 2+ 2+ 2+ Norm 2+ 2+      Gait: deferred     ASSESSMENT:  84 year old female with history of RCC and MGUS (currently being worked up for multiple myeloma), presenting with back pain and found to have a lytic lesion on T11 associated with compression fracture with minimal height loss and a second lesion at L4. She is currently full strength at all 4 extremities, does not have any sensory deficits, no hyperreflexia, Mendes's or clonus. She has been on her Xarelto recently and her last INR is 2.17. She also has elevated troponin levels (trending up) and being evaluated by cardiology. Patient currently does not endorse back pain, only has tenderness to palpation on the lower back, more consistent with L4 area. At this time, a less invasive method for biopsy would be safer for initial approach if a biopsy is warranted per primary team.    RECOMMENDATIONS:  -- No neurosurgical intervention indicated at this time   -- Recommend contacting Interventional Radiology if biopsy is pursued  -- No activity restrictions and bracing needed   -- MRI T and L spine with and without contrast for further evaluation   -- Neurosurgery will continue to follow    The patient's presentation, exam, and imaging were discussed with Dr. Gomez, neurosurgery chief resident, and Dr. Dutta, neurosurgery staff.    Mary Grace Ellison MD,  PGY-1  Department of Neurosurgery  Pager: [phone]    Please contact neurosurgery resident on call with questions.    Dial * * *[phone], enter [phone] when prompted.     Associated Relevant Data in the Medical Record:  PAST MEDICAL HISTORY:   Past Medical History:   Diagnosis Date    Arthritis     Hypertension      PAST SURGICAL HISTORY:   Past Surgical History:   Procedure Laterality Date    ORTHOPEDIC SURGERY      hip and knee replacement surgery     FAMILY HISTORY: History reviewed. No pertinent family history.    SOCIAL HISTORY:   Social History     Tobacco Use    Smoking status: Never    Smokeless tobacco: Never   Substance Use Topics    Alcohol use: Not Currently       MEDICATIONS:  No current outpatient medications on file.       Allergies:  No Known Allergies    IMAGING:  Recent Results (from the past 24 hour(s))   CT Thoracic Spine Reconstructed    Narrative    EXAM: CT THORACIC SPINE RECONSTRUCTED  LOCATION: [facility]  DATE: 7/17/2024    INDICATION: central lower thoracic back pain, reproducible; non traumatic, reproducible  COMPARISON: None.  TECHNIQUE: Routine CT Thoracic Spine without IV contrast. Multiplanar reformats. Dose reduction techniques were used.     FINDINGS:  VERTEBRA: Fracture of T11 with slight vertebral body height loss. There is suggestion of underlying lesion in the left pedicle extending into vertebral bodies and posterior elements as well as into adjacent soft tissues. MRI thoracic spine without and   with contrast recommended; providing patient is MRI compatible. Mild wedging of a few mid thoracic vertebral bodies with mildly exaggerated thoracic kyphosis. Upper left and lower right thoracic curve.    CANAL/FORAMINA: Mild to moderate degenerative changes without significant canal narrowing at any level. Multilevel mild to moderate neural foraminal narrowing.    PARASPINAL: Probable 22 mm nodule posterior aspect left thyroid gland; not  "well-visualized ultrasound recommended, if not done previously.      Impression    IMPRESSION:  1.  Fracture of T11 with slight vertebral body height loss. There is suggestion of underlying lesion in the left pedicle extending into vertebral bodies and posterior elements as well as into adjacent soft tissues.   2.  MRI thoracic spine without and with contrast recommended; providing patient is MRI compatible.  3.  No definite other fractures.     CT Aortic Survey w Contrast    Addendum: 7/18/2024    ADDITIONAL INDICATIONS ADDENDUM:    Received a request from the clinical service for additional review of the images to evaluate \"metastatic burden\". Images were reviewed in detail. The lumbar vertebra, pelvis, and sacrum are demineralized, however no other lytic or destructive bone   lesions are identified. With regard to a site of primary malignancy, imaged portions of the breasts show no nodules. There are no concerning lung nodules or solid renal lesions. No potential source of primary neoplasm is identified in the chest, abdomen,   or pelvis.    These findings were discussed with Dr. Uriostegui by telephone 7/18/2024 3:21 PM CDT.    END ADDENDUM       Narrative    EXAM: CT AORTIC SURVEY W CONTRAST  LOCATION: [facility]  DATE: 7/17/2024    INDICATION: back pain worsening x1 week; hx saddle PE, tobacco use  COMPARISON: No prior chest imaging; CT of the abdomen and pelvis with contrast 9/28/2021  TECHNIQUE: CT angiogram chest abdomen pelvis during arterial phase of injection of IV contrast. 2D and 3D MIP reconstructions were performed by the CT technologist. Dose reduction techniques were used.   CONTRAST: 90mL Isovue 370    FINDINGS:   CT ANGIOGRAM CHEST, ABDOMEN, AND PELVIS: The main pulmonary artery is normal caliber. There are no pulmonary artery filling defects. The pulmonary arteries taper normally as they extend towards the pleura. Minimal calcification of the aortic " root.   Sinotubular junction is preserved. No thoracic aortic aneurysm. 2 vessel arch anatomy. Mixed attenuation plaque is present in the arch and descending aorta. No acute aortic syndrome.    Mixed attenuation plaque is present in the normal caliber abdominal aorta and in the common iliac arteries. The and celiac axis, SMA, and ANNETTE are patent. No stenoses of the origins or proximal segments of the renal arteries.    LUNGS AND PLEURA: Upper lung predominant emphysema. There is a circumscribed, polygonal nodule along the lateral costal pleura of the right upper lobe measuring 5-6 mm (series 4, image 98) which has typical features of an intrapulmonary lymph node. No   other lung nodules. Trachea and central airways are patent. No bronchial wall thickening or bronchiectasis. No pleural effusions.    MEDIASTINUM: Cardiac chambers are normal in size. No pericardial effusion. No enlarged mediastinal or hilar lymph nodes. The esophagus is decompressed. Heterogeneous thyroid gland, but no actionable thyroid nodule.    CORONARY ARTERY CALCIFICATION: Severe.    HEPATOBILIARY: Slightly lobulated liver contour. Small calcified stones layer into the fundus of the gallbladder. No gallbladder wall thickening or pericholecystic inflammation. No bile duct enlargement.    PANCREAS: Normal.    SPLEEN: Spleen is normal in size. There are several embolic coils along the course of the splenic artery in the left upper quadrant.    ADRENAL GLANDS: Normal.    KIDNEYS/BLADDER: No significant mass, stones, or hydronephrosis. There are simple or benign cysts. No follow up is needed.    BOWEL: Mild diverticulosis of the colon in the left lower quadrant. No dilated bowel or bowel wall thickening.    LYMPH NODES: There are no enlarged lymph nodes in the abdomen or pelvis.    PELVIC ORGANS: Status post hysterectomy. No pelvic mass.    MUSCULOSKELETAL: Generalized bone demineralization. There is a lytic lesion related to the left transverse  process of T12 (series 4, image 225). Status post left hip arthroplasty. No periprosthetic fractures. Fibrofatty thickening deep to the latissimus   muscles bilaterally consistent with mild elastofibroma dorsi. Small fat-containing umbilical hernia. Previous abdominoplasty.      Impression    IMPRESSION:    1.  No acute pulmonary embolism or acute aortic syndrome.  2.  Focal lytic lesion associated with the left T12 transverse process consistent with neoplasm, either representing a myelomatous lesion or lytic metastasis.  3.  Diverticulosis of the distal colon but no diverticulitis.  4.  Cholelithiasis.     Echo Complete   Result Value    LVEF  70%    Narrative    [id number]  [id number]  [id number]  [id number]^PAYAL^ALEXX^EDYTA     [facility],[place]  Echocardiography Laboratory  [address] [place] [id number]     Name: VIJAY PERRIN  MRN: [id number]  : 1940  Study Date: 2024 12:49 PM  Age: 84 yrs  Gender: Female  Patient Location: [facility], [place]  Reason For Study: Arrhythmia  Ordering Physician: ALEXX MOE  Performed By: Gabbie Diaz RDCS     BSA: 2.2 m2  Height: 67 in  Weight: 236 lb  HR: 74  BP: 101/59 mmHg  ______________________________________________________________________________  Procedure  Complete Portable Echo Adult. Contrast Optison. Technically difficult  study.Extremely poor acoustic windows. Optison (NDC #1186-5450-30) given  intravenously. Patient was given 5 ml mixture of 3 ml Optison and 6 ml saline.  4 ml wasted.  ______________________________________________________________________________  Interpretation Summary  Technically difficult study.Extremely poor acoustic windows.  Global and regional left ventricular function is hyperkinetic with an EF >70%.  Global right ventricular function is normal.  The inferior vena cava is normal.  No pericardial effusion.  There is no prior study for direct  comparison.  ______________________________________________________________________________  Left Ventricle  Global and regional left ventricular function is hyperkinetic with an EF >70%.  Left ventricular wall thickness cannot evaluate. Left ventricular cavity size  is small. Left ventricular diastolic function is not assessable. No regional  wall motion abnormalities are seen.     Right Ventricle  The right ventricle is normal size. Global right ventricular function is  normal.     Atria  The atria cannot be assessed.     Mitral Valve  The valve leaflets are not well visualized. Trace mitral insufficiency is  present.     Aortic Valve  The valve leaflets are not well visualized. On Doppler interrogation, there is  no significant stenosis or regurgitation.     Tricuspid Valve  The tricuspid valve cannot be assessed.     Pulmonic Valve  The pulmonic valve cannot be assessed.     Vessels  The aorta root cannot be assessed. The thoracic aorta cannot be assessed. The  inferior vena cava is normal.     Pericardium  No pericardial effusion is present.     Compared to Previous Study  There is no prior study for direct comparison.  ______________________________________________________________________________  MMode/2D Measurements & Calculations     TAPSE: 1.3 cm     Doppler Measurements & Calculations  MV E max horace: 53.6 cm/sec  MV A max horace: 86.5 cm/sec  MV E/A: 0.62  MV dec time: 0.30 sec  E/E' av.7  Lateral E/e': 7.9  Medial E/e': 11.6  RV S Horace: 12.6 cm/sec     ______________________________________________________________________________  Report approved by: Renate Reece 2024 03:25 PM             LABS:   Last Comprehensive Metabolic Panel:  Lab Results   Component Value Date     2024    POTASSIUM 3.7 2024    CHLORIDE 102 2024    CO2 23 2024    ANIONGAP 15 2024     (H) 2024    BUN 27.3 (H) 2024    CR 0.90 2024    GFRESTIMATED 64  07/18/2024    TREVOR 10.0 07/18/2024         Lab Results   Component Value Date    WBC 5.1 07/18/2024     Lab Results   Component Value Date    RBC 4.61 07/18/2024     Lab Results   Component Value Date    HGB 13.6 07/18/2024     Lab Results   Component Value Date    HCT 42.0 07/18/2024     Lab Results   Component Value Date    MCV 91 07/18/2024     Lab Results   Component Value Date    MCH 29.5 07/18/2024     Lab Results   Component Value Date    MCHC 32.4 07/18/2024     Lab Results   Component Value Date    RDW 15.0 07/18/2024     Lab Results   Component Value Date     07/18/2024     INR   Date Value Ref Range Status   07/17/2024 2.17 (H) 0.85 - 1.15 Final      aPTT   Date Value Ref Range Status   07/17/2024 39 (H) 22 - 38 Seconds Final

## 2024-07-19 ENCOUNTER — APPOINTMENT (OUTPATIENT)
Dept: MRI IMAGING | Facility: CLINIC | Age: 84
DRG: 543 | End: 2024-07-19
Payer: COMMERCIAL

## 2024-07-19 ENCOUNTER — APPOINTMENT (OUTPATIENT)
Dept: NUCLEAR MEDICINE | Facility: CLINIC | Age: 84
DRG: 543 | End: 2024-07-19
Payer: COMMERCIAL

## 2024-07-19 ENCOUNTER — APPOINTMENT (OUTPATIENT)
Dept: OCCUPATIONAL THERAPY | Facility: CLINIC | Age: 84
DRG: 543 | End: 2024-07-19
Payer: COMMERCIAL

## 2024-07-19 ENCOUNTER — APPOINTMENT (OUTPATIENT)
Dept: PHYSICAL THERAPY | Facility: CLINIC | Age: 84
DRG: 543 | End: 2024-07-19
Payer: COMMERCIAL

## 2024-07-19 LAB
ALBUMIN SERPL BCG-MCNC: 3.5 G/DL (ref 3.5–5.2)
ALBUMIN SERPL ELPH-MCNC: 3.2 G/DL (ref 3.7–5.1)
ALP SERPL-CCNC: 172 U/L (ref 40–150)
ALPHA1 GLOB SERPL ELPH-MCNC: 0.5 G/DL (ref 0.2–0.4)
ALPHA2 GLOB SERPL ELPH-MCNC: 1.3 G/DL (ref 0.5–0.9)
ALT SERPL W P-5'-P-CCNC: 5 U/L (ref 0–50)
ANION GAP SERPL CALCULATED.3IONS-SCNC: 14 MMOL/L (ref 7–15)
AST SERPL W P-5'-P-CCNC: 21 U/L (ref 0–45)
B-GLOBULIN SERPL ELPH-MCNC: 0.8 G/DL (ref 0.6–1)
B2 MICROGLOB TUMOR MARKER SER-MCNC: 3.6 MG/L
BASOPHILS # BLD AUTO: 0 10E3/UL (ref 0–0.2)
BASOPHILS NFR BLD AUTO: 1 %
BILIRUB SERPL-MCNC: 0.7 MG/DL
BUN SERPL-MCNC: 28.7 MG/DL (ref 8–23)
CALCIUM SERPL-MCNC: 10.3 MG/DL (ref 8.8–10.4)
CHLORIDE SERPL-SCNC: 102 MMOL/L (ref 98–107)
CREAT SERPL-MCNC: 0.96 MG/DL (ref 0.51–0.95)
EGFRCR SERPLBLD CKD-EPI 2021: 59 ML/MIN/1.73M2
EOSINOPHIL # BLD AUTO: 0.2 10E3/UL (ref 0–0.7)
EOSINOPHIL NFR BLD AUTO: 3 %
ERYTHROCYTE [DISTWIDTH] IN BLOOD BY AUTOMATED COUNT: 14.9 % (ref 10–15)
GAMMA GLOB SERPL ELPH-MCNC: 1.2 G/DL (ref 0.7–1.6)
GLUCOSE SERPL-MCNC: 102 MG/DL (ref 70–99)
HCO3 SERPL-SCNC: 24 MMOL/L (ref 22–29)
HCT VFR BLD AUTO: 41.4 % (ref 35–47)
HGB BLD-MCNC: 13.1 G/DL (ref 11.7–15.7)
IGA SERPL-MCNC: 229 MG/DL (ref 84–499)
IGG SERPL-MCNC: 1214 MG/DL (ref 610–1616)
IGM SERPL-MCNC: 119 MG/DL (ref 35–242)
IMM GRANULOCYTES # BLD: 0 10E3/UL
IMM GRANULOCYTES NFR BLD: 1 %
LYMPHOCYTES # BLD AUTO: 0.9 10E3/UL (ref 0.8–5.3)
LYMPHOCYTES NFR BLD AUTO: 14 %
M PROTEIN SERPL ELPH-MCNC: 0.2 G/DL
MCH RBC QN AUTO: 28.8 PG (ref 26.5–33)
MCHC RBC AUTO-ENTMCNC: 31.6 G/DL (ref 31.5–36.5)
MCV RBC AUTO: 91 FL (ref 78–100)
MONOCYTES # BLD AUTO: 0.9 10E3/UL (ref 0–1.3)
MONOCYTES NFR BLD AUTO: 15 %
NEUTROPHILS # BLD AUTO: 4.3 10E3/UL (ref 1.6–8.3)
NEUTROPHILS NFR BLD AUTO: 66 %
NRBC # BLD AUTO: 0 10E3/UL
NRBC BLD AUTO-RTO: 0 /100
PLATELET # BLD AUTO: 393 10E3/UL (ref 150–450)
POTASSIUM SERPL-SCNC: 4 MMOL/L (ref 3.4–5.3)
PROT ELPH PNL UR ELPH: NORMAL
PROT PATTERN SERPL ELPH-IMP: ABNORMAL
PROT PATTERN UR ELPH-IMP: NORMAL
PROT SERPL-MCNC: 7.4 G/DL (ref 6.4–8.3)
RBC # BLD AUTO: 4.55 10E6/UL (ref 3.8–5.2)
SODIUM SERPL-SCNC: 140 MMOL/L (ref 135–145)
TOTAL PROTEIN SERUM FOR ELP: 7 G/DL (ref 6.4–8.3)
WBC # BLD AUTO: 6.3 10E3/UL (ref 4–11)

## 2024-07-19 PROCEDURE — 250N000013 HC RX MED GY IP 250 OP 250 PS 637: Performed by: STUDENT IN AN ORGANIZED HEALTH CARE EDUCATION/TRAINING PROGRAM

## 2024-07-19 PROCEDURE — 343N000001 HC RX 343: Performed by: FAMILY MEDICINE

## 2024-07-19 PROCEDURE — 99232 SBSQ HOSP IP/OBS MODERATE 35: CPT | Mod: GC

## 2024-07-19 PROCEDURE — A9585 GADOBUTROL INJECTION: HCPCS | Performed by: FAMILY MEDICINE

## 2024-07-19 PROCEDURE — 97530 THERAPEUTIC ACTIVITIES: CPT | Mod: GP | Performed by: PHYSICAL THERAPIST

## 2024-07-19 PROCEDURE — 72147 MRI CHEST SPINE W/DYE: CPT | Mod: 26 | Performed by: STUDENT IN AN ORGANIZED HEALTH CARE EDUCATION/TRAINING PROGRAM

## 2024-07-19 PROCEDURE — A9503 TC99M MEDRONATE: HCPCS | Performed by: FAMILY MEDICINE

## 2024-07-19 PROCEDURE — 999N000127 HC STATISTIC PERIPHERAL IV START W US GUIDANCE

## 2024-07-19 PROCEDURE — 250N000011 HC RX IP 250 OP 636: Performed by: STUDENT IN AN ORGANIZED HEALTH CARE EDUCATION/TRAINING PROGRAM

## 2024-07-19 PROCEDURE — 97116 GAIT TRAINING THERAPY: CPT | Mod: GP | Performed by: PHYSICAL THERAPIST

## 2024-07-19 PROCEDURE — 72149 MRI LUMBAR SPINE W/DYE: CPT

## 2024-07-19 PROCEDURE — 85025 COMPLETE CBC W/AUTO DIFF WBC: CPT | Performed by: STUDENT IN AN ORGANIZED HEALTH CARE EDUCATION/TRAINING PROGRAM

## 2024-07-19 PROCEDURE — 250N000011 HC RX IP 250 OP 636

## 2024-07-19 PROCEDURE — 36415 COLL VENOUS BLD VENIPUNCTURE: CPT | Performed by: STUDENT IN AN ORGANIZED HEALTH CARE EDUCATION/TRAINING PROGRAM

## 2024-07-19 PROCEDURE — 999N000040 HC STATISTIC CONSULT NO CHARGE VASC ACCESS

## 2024-07-19 PROCEDURE — 250N000013 HC RX MED GY IP 250 OP 250 PS 637

## 2024-07-19 PROCEDURE — 72149 MRI LUMBAR SPINE W/DYE: CPT | Mod: 26 | Performed by: STUDENT IN AN ORGANIZED HEALTH CARE EDUCATION/TRAINING PROGRAM

## 2024-07-19 PROCEDURE — 78306 BONE IMAGING WHOLE BODY: CPT | Mod: 26 | Performed by: RADIOLOGY

## 2024-07-19 PROCEDURE — 78306 BONE IMAGING WHOLE BODY: CPT

## 2024-07-19 PROCEDURE — 250N000012 HC RX MED GY IP 250 OP 636 PS 637: Performed by: FAMILY MEDICINE

## 2024-07-19 PROCEDURE — 120N000002 HC R&B MED SURG/OB UMMC

## 2024-07-19 PROCEDURE — 255N000002 HC RX 255 OP 636: Performed by: FAMILY MEDICINE

## 2024-07-19 PROCEDURE — 72147 MRI CHEST SPINE W/DYE: CPT

## 2024-07-19 PROCEDURE — 80053 COMPREHEN METABOLIC PANEL: CPT | Performed by: STUDENT IN AN ORGANIZED HEALTH CARE EDUCATION/TRAINING PROGRAM

## 2024-07-19 PROCEDURE — 97535 SELF CARE MNGMENT TRAINING: CPT | Mod: GO | Performed by: OCCUPATIONAL THERAPIST

## 2024-07-19 RX ORDER — HYDROMORPHONE HYDROCHLORIDE 1 MG/ML
0.5 INJECTION, SOLUTION INTRAMUSCULAR; INTRAVENOUS; SUBCUTANEOUS
Status: COMPLETED | OUTPATIENT
Start: 2024-07-19 | End: 2024-07-19

## 2024-07-19 RX ORDER — TC 99M MEDRONATE 20 MG/10ML
20-30 INJECTION, POWDER, LYOPHILIZED, FOR SOLUTION INTRAVENOUS ONCE
Status: COMPLETED | OUTPATIENT
Start: 2024-07-19 | End: 2024-07-19

## 2024-07-19 RX ORDER — HYDROMORPHONE HYDROCHLORIDE 1 MG/ML
0.3 INJECTION, SOLUTION INTRAMUSCULAR; INTRAVENOUS; SUBCUTANEOUS
Status: DISCONTINUED | OUTPATIENT
Start: 2024-07-19 | End: 2024-07-19

## 2024-07-19 RX ORDER — GADOBUTROL 604.72 MG/ML
10.7 INJECTION INTRAVENOUS ONCE
Status: COMPLETED | OUTPATIENT
Start: 2024-07-19 | End: 2024-07-19

## 2024-07-19 RX ADMIN — SPIRONOLACTONE 50 MG: 25 TABLET ORAL at 10:06

## 2024-07-19 RX ADMIN — SENNOSIDES 8.6 MG: 8.6 TABLET, FILM COATED ORAL at 10:07

## 2024-07-19 RX ADMIN — Medication 12.5 MG: at 10:06

## 2024-07-19 RX ADMIN — OXYCODONE HYDROCHLORIDE 5 MG: 5 TABLET ORAL at 05:57

## 2024-07-19 RX ADMIN — RIVAROXABAN 20 MG: 10 TABLET, FILM COATED ORAL at 17:28

## 2024-07-19 RX ADMIN — HYDROCHLOROTHIAZIDE 50 MG: 50 TABLET ORAL at 10:06

## 2024-07-19 RX ADMIN — AZATHIOPRINE 75 MG: 50 TABLET ORAL at 10:06

## 2024-07-19 RX ADMIN — POLYETHYLENE GLYCOL 3350 17 G: 17 POWDER, FOR SOLUTION ORAL at 10:07

## 2024-07-19 RX ADMIN — Medication 12.5 MG: at 20:25

## 2024-07-19 RX ADMIN — OXYCODONE HYDROCHLORIDE 5 MG: 5 TABLET ORAL at 11:42

## 2024-07-19 RX ADMIN — HYDROMORPHONE HYDROCHLORIDE 0.5 MG: 1 INJECTION, SOLUTION INTRAMUSCULAR; INTRAVENOUS; SUBCUTANEOUS at 12:43

## 2024-07-19 RX ADMIN — GADOBUTROL 10.7 ML: 604.72 INJECTION INTRAVENOUS at 22:48

## 2024-07-19 RX ADMIN — TC 99M MEDRONATE 22.6 MILLICURIE: 20 INJECTION, POWDER, LYOPHILIZED, FOR SOLUTION INTRAVENOUS at 08:55

## 2024-07-19 RX ADMIN — HYDROMORPHONE HYDROCHLORIDE 0.5 MG: 1 INJECTION, SOLUTION INTRAMUSCULAR; INTRAVENOUS; SUBCUTANEOUS at 22:11

## 2024-07-19 RX ADMIN — ATORVASTATIN CALCIUM 20 MG: 20 TABLET, FILM COATED ORAL at 10:07

## 2024-07-19 RX ADMIN — Medication 25 MCG: at 10:07

## 2024-07-19 NOTE — CONSULTS
"Consult received for Vascular access care.  See LDA for details.  For additional needs place \"Nursing to Consult for Vascular Access\" [id number] order in EPIC.  "

## 2024-07-19 NOTE — PLAN OF CARE
"  Problem: Adult Inpatient Plan of Care  Goal: Plan of Care Review  Description:   VS:  /74 (BP Location: Right arm)   Pulse 68   Temp 98  F (36.7  C) (Oral)   Resp 18   Ht 1.721 m (5' 7.76\")   Wt 107.1 kg (236 lb 1.6 oz)   SpO2 97%   BMI 36.16 kg/m      O2:  Room Air    Output:  Continent of Bowel and Bladder    Last BM:  07/19/24   Activity:  SBA / Walker / Cane/    Up for meals?  Yes    Skin:  No Issues    Pain:  Yes    CMS:  A&OX 3-4   Dressing:  None    Diet:  Regular    LDA:  Left PIV / SL   Equipment:  Personal Belongings    Plan:  POC   Additional Info:  Telemetry       Pain   Oxycodone   PO     5 mg  1142  Dilaudid         IV   0.5 mg  1243    Bone Scan    Outcome: Progressing  Goal: Patient-Specific Goal (Individualized)  Description: You can add care plan individualizations to a care plan. Examples of Individualization might be:  \"Parent requests to be called daily at 9am for status\", \"I have a hard time hearing out of my right ear\", or \"Do not touch me to wake me up as it startles  me\".  Outcome: Progressing  Goal: Absence of Hospital-Acquired Illness or Injury  Outcome: Progressing  Intervention: Identify and Manage Fall Risk  Recent Flowsheet Documentation  Taken 7/19/2024 0735 by Latrice Ly, RN  Safety Promotion/Fall Prevention:   safety round/check completed   nonskid shoes/slippers when out of bed  Intervention: Prevent Skin Injury  Recent Flowsheet Documentation  Taken 7/19/2024 0735 by Latrice Ly, RN  Body Position: supine, head elevated  Goal: Optimal Comfort and Wellbeing  Outcome: Progressing  Goal: Readiness for Transition of Care  Outcome: Progressing     Problem: Risk for Delirium  Goal: Optimal Coping  Outcome: Progressing  Goal: Improved Behavioral Control  Outcome: Progressing  Goal: Improved Attention and Thought Clarity  Outcome: Progressing  Goal: Improved Sleep  Outcome: Progressing   Goal Outcome Evaluation:                        "

## 2024-07-19 NOTE — CONSULTS
"    Interventional Radiology  [facility] Consult Service Note  07/19/24   4:52 PM    Consult Requested: Bone biopsy    Recommendations/Plan:    Patient will be added to IR schedule on next week (week of 7/22/24) if able for a CT-guided bone biopsy with Neuroradiology. Date/timing of procedure is TBD based on staffing/schedule and triage.    This is a 84 year old female with history of MGUS and multiple lytic bone lesions. Patient's team requesting biopsy. Patient is on anticoagulation, with last INR > 2.     Images/patient reviewed by Neuroradiology, with approved CT-guided biopsy of T11/transverse process lesion. This will likely be performed outpatient, though Neuroradiology service did not clarify this. Team should not delay discharge. This may be scheduled EB or WB, pending Neuroradiology availability.    Orders for surg/path are in. Any additional diagnostics to be entered by requesting team.    Labs WNL for procedure. Repeat INR is pending.  Preprocedural orders entered, as well as orders for procedure, NPO statu.   Medications to be held include: Xarelto  Consent will be done prior to procedure.     Please contact the IR charge RN at [phone] for estimated time of procedure.     Case and imaging discussed with Neuroradiology attending (Dr. Kearns). Recommendations were reviewed with requesting team.        Pertinent Imaging Reviewed: MR 7/18/24.    Expected date of discharge:  TBD    Vitals:   /74 (BP Location: Right arm)   Pulse 68   Temp 98  F (36.7  C) (Oral)   Resp 18   Ht 1.721 m (5' 7.76\")   Wt 107.1 kg (236 lb 1.6 oz)   SpO2 97%   BMI 36.16 kg/m      Pertinent Labs:   Lab Results   Component Value Date    WBC 6.3 07/19/2024    WBC 5.1 07/18/2024    WBC 7.6 07/17/2024     Lab Results   Component Value Date    HGB 13.1 07/19/2024    HGB 13.6 07/18/2024    HGB 14.4 07/17/2024     Lab Results   Component Value Date     07/19/2024     07/18/2024     " 07/17/2024     Lab Results   Component Value Date    INR 2.17 (H) 07/17/2024    PTT 39 (H) 07/17/2024     Lab Results   Component Value Date    POTASSIUM 4.0 07/19/2024    POTASSIUM 4.3 09/28/2021        COVID-19 Antibody Results, Testing for Immunity           No data to display              COVID-19 PCR Results           No data to display                Esau Guzman PA-C  Interventional Radiology  Pager: [phone]

## 2024-07-19 NOTE — PLAN OF CARE
A/O x4. Telemetry discontinued  Regular diet. Takes medications whole.   Continent B/B. LBM 7/19  SBA w/ cane  L PIV removed d/t pain  MRI w/out contrast done  Urine samples sent to lab for urinanalysis  Pain managed with PRN oxy.  Call light within reach, will continue to monitor and follow POC.

## 2024-07-19 NOTE — PROGRESS NOTES
[facility]    Progress Note - [facility]       Date of Admission:  7/17/2024    Today's plan:   - Will need to repeat Lumbar/Thoracic MRI as contrast was not administered during initial exam  - Bone scan today  - Working to coordinate bone biopsy  - IV dilaudid 0.5mg PRN once for repeat MRI    Assessment & Plan   Cara Cool is an 84 year old female admitted on 7/17/24 for worsening non-traumatic back pain. Relevant PMHx of osteoporosis, RCC, b/l renal cysts, MGUS, saddle PE with acute cor pulmonale, atrial tachycardia, and paroxysmal afib. Found to have left T12 transverse process lytic lesion consistent with neoplasm, T11 fx, tachycardia (resolved) and junctional rhythm on EKG. Currently, getting worked up for MM and possibly getting T12 biopsy. Hematology, cardiology, neurosurgery, and IR onboard.     # Numerous bony lesions concerning for malignancy with heavy metastatic burden  # MGUS, concern for multiple myeloma  # Mid-back pain  # Osteoporosis (DEXA Feb '24)  # Osteoarthritis  Presented with significant, persistent mid-back pain and found to have T12 lytic lesion and T11 fracture on ED imaging. History of MGUS (diagnosed Dec 2023) and RCC (diagnosed 2020/2021, tx'd with cryoablation without recurrence). Here, has had elevations in free light chains. On imaging (CT aortic survey, MRI thoracolumbar, bone scan) has had evidence of widespread bony metastasis but without clear evidence of primary source.     Consults  - Hematology, recs:   - Biopsy of bony lesio   - Bone scan   - SPEP, B2 microglobulin, follow-up MRI as below  - Neurosurgery, recs:   - Biopsy of bony lesion   - MRI T & L spine w/ & w/o   - No surgical intervention needed at present  - IR, attempting to coordinate inpatient bone biopsy    - Tylenol 650 mg q8h PRN (not to exceed 2g in one day due to autoimmune hepatitis)  - Lidocaine patch  - Oxycodone 2.5-5 mg q4h PRN  - Hold  PTA tramadol  - PTA Cholecalciferol 800 unit(s) daily  - Daily CBC, CMP     # Paroxysmal Afib  # Tachycardia  # Junctional rhythm  History of Afib, tachycardic to 120s-130s on admission, asymptomatic. Multiple EKGs showed junctional rhythm and ST and T-wave abnormalities not present on last EKG. Troponin slightly elevated on admit, plateau at 27. Echo without evidence of structural or functional change, EF >70%.  Cardiology consulted, recs:  - discontinue digoxin   - start metoprolol 2.5mg BID  - follow up 1-2 months (they will place referral)  - Rivaroxaban  20 mg daily    # Autoimmune hepatitis   # Cholelithiasis  # Bilateral multiple renal cysts  Hepatitis diagnosed in 2017. Stable on azathioprine. Stable LFTs with elevated alk phos, which may be due to bone pathology such as multiple myeloma. Bilateral multiple renal cysts, benign-appearing, seen on abdominal imaging since at least 2021.  - AzaTHIOprine 50 MG tablet daily  - Daily CMP     # Hyperparathyroidism s/p parathyroid adenoma removal  # Hypercalcemia, chronic  Per chart review, calcium elevated since at least 2010, after which she was found to have an elevated PTH which eventually led to discovery of an inferior L parathyroid adenoma which was resected in 2019. Slightly elevated on admit, otherwise WNL.    # History of saddle PE with cor pulmonale, 2021  - HOLD PTA Xarelto in anticipation of upcoming biopsy as above     Chronic/Stable  # Hypercholesterolemia: Atorvastatin 20 mg daily  # Hypertension: PTA Hydrochlorothiazide, PTA Spironolactone   # Constipation: Miralax, Senna scheduled daily  # Remote hx of TIA (2013) & resultant strabismus     Diet: Combination Diet Regular Diet Adult    DVT Prophylaxis: DOAC  Lundberg Catheter: Not present  Fluids: None  Lines: PIV    Cardiac Monitoring: None  Code Status: Full Code           # Hypercalcemia: Highest Ca = 10.5 mg/dL in last 2 days, will monitor as appropriate    # Hypoalbuminemia: Lowest albumin = 3.4  "g/dL at 7/18/2024  6:49 AM, will monitor as appropriate    # Coagulation Defect: INR = 2.17 (Ref range: 0.85 - 1.15) and/or PTT = 39 Seconds (Ref range: 22 - 38 Seconds), will monitor for bleeding    # Hypertension: Noted on problem list                # Obesity: Estimated body mass index is 36.16 kg/m  as calculated from the following:    Height as of this encounter: 1.721 m (5' 7.76\").    Weight as of this encounter: 107.1 kg (236 lb 1.6 oz)., PRESENT ON ADMISSION            Disposition Plan      Expected Discharge Date: discharge after imaging results and recs from teams involved.     The patient's care was discussed with the Attending Physician, Dr. Uriostegui, Chief Resident/Fellow, and Patient.    DO [facility]  [facility]  Securely message with [employer] (more info)  Text page via [employer] Paging/Directory   See signed in provider for up to date coverage information  ______________________________________________________________________    Interval History   Pertinent overnight events: no acute events.     Pt denies any new concerns this morning, other than sharing that her pain was quite significant during the MRI (exacerbated by long period of time lying on the table). No new neurologic findings, denies any new complaints.    Physical Exam   Vital Signs: Temp: 98  F (36.7  C) Temp src: Oral BP: 118/74 Pulse: 68   Resp: 18 SpO2: 97 % O2 Device: None (Room air)    Weight: 236 lbs 1.6 oz    Constitutional: awake, alert, cooperative, no apparent distress, and appears stated age  Eyes: Strabismus with significant L sided exotropia, consistent with baseline  Respiratory: No increased work of breathing, good air exchange, clear to auscultation bilaterally, no crackles or wheezing  Cardiovascular: Regular rate and rhythm, normal S1 and S2, no S3 or S4, and no murmur noted  GI: Non-distended, non-tender  MSK: Declined MSK exam due " to pain on palpation, states no significant change      Data     I have personally reviewed the following data over the past 24 hrs:    6.3  \   13.1   / 393     140 102 28.7 (H) /  102 (H)   4.0 24 0.96 (H) \     ALT: 5 AST: 21 AP: 172 (H) TBILI: 0.7   ALB: 3.5 TOT PROTEIN: 7.4 LIPASE: N/A       Imaging results reviewed over the past 24 hrs:   Recent Results (from the past 24 hour(s))   Echo Complete   Result Value    LVEF  70%    Narrative    [id number]  [id number]  [id number]  [id number]^PAYAL^ALEXX^EDYTA     [facility],[place]  Echocardiography Laboratory  [address] [place] [address]     Name: VIJAY PERRIN  MRN: [id number]  : 1940  Study Date: 2024 12:49 PM  Age: 84 yrs  Gender: Female  Patient Location: [facility]  Reason For Study: Arrhythmia  Ordering Physician: ALEXX MOE  Performed By: Gabbie Diaz YAQUELIN     BSA: 2.2 m2  Height: 67 in  Weight: 236 lb  HR: 74  BP: 101/59 mmHg  ______________________________________________________________________________  Procedure  Complete Portable Echo Adult. Contrast Optison. Technically difficult  study.Extremely poor acoustic windows. Optison (NDC #8886-2656-19) given  intravenously. Patient was given 5 ml mixture of 3 ml Optison and 6 ml saline.  4 ml wasted.  ______________________________________________________________________________  Interpretation Summary  Technically difficult study.Extremely poor acoustic windows.  Global and regional left ventricular function is hyperkinetic with an EF >70%.  Global right ventricular function is normal.  The inferior vena cava is normal.  No pericardial effusion.  There is no prior study for direct comparison.  ______________________________________________________________________________  Left Ventricle  Global and regional left ventricular function is hyperkinetic with an EF >70%.  Left ventricular wall thickness cannot evaluate. Left ventricular cavity size  is  small. Left ventricular diastolic function is not assessable. No regional  wall motion abnormalities are seen.     Right Ventricle  The right ventricle is normal size. Global right ventricular function is  normal.     Atria  The atria cannot be assessed.     Mitral Valve  The valve leaflets are not well visualized. Trace mitral insufficiency is  present.     Aortic Valve  The valve leaflets are not well visualized. On Doppler interrogation, there is  no significant stenosis or regurgitation.     Tricuspid Valve  The tricuspid valve cannot be assessed.     Pulmonic Valve  The pulmonic valve cannot be assessed.     Vessels  The aorta root cannot be assessed. The thoracic aorta cannot be assessed. The  inferior vena cava is normal.     Pericardium  No pericardial effusion is present.     Compared to Previous Study  There is no prior study for direct comparison.  ______________________________________________________________________________  MMode/2D Measurements & Calculations     TAPSE: 1.3 cm     Doppler Measurements & Calculations  MV E max horace: 53.6 cm/sec  MV A max horace: 86.5 cm/sec  MV E/A: 0.62  MV dec time: 0.30 sec  E/E' av.7  Lateral E/e': 7.9  Medial E/e': 11.6  RV S Horace: 12.6 cm/sec     ______________________________________________________________________________  Report approved by: Renate Reece 2024 03:25 PM         MR Thoracic Spine w/o Contrast    Narrative    EXAM: MR THORACIC SPINE W/O CONTRAST, MR LUMBAR SPINE W/O CONTRAST   2024 10:56 PM     HISTORY: 83 yo with constant back pain for a week w MGUS and lytic  lesions on t spine       COMPARISON: Thoracic spine CT 2024, CT AP 2021    TECHNIQUE: Sagittal T1-weighted, sagittal T2-weighted, sagittal STIR,  sagittal diffusion weighted (with ADC map), and axial T2-weighted  images of the thoracic spine were obtained without intravenous  contrast.    FINDINGS:  12 thoracic vertebra. 5 lumbar vertebra.    Thoracic:  Slight  exaggeration of the normal thoracic kyphosis..No loss of disc  height. Spinal cord signal is within normal limits. No significant  spinal canal or neuroforaminal stenosis.     Multiple foci of T1 hypointense and and STIR/DWI hyperintense lesions  diffusely throughout the thoracic vertebra, largest at T11 left aspect  of the vertebral body and extending into the posterior elements,  causing mass effect upon the thecal sac, moderate spinal canal  stenosis and moderate right neural foraminal stenosis..    Multiple renal cysts including one in the left upper pole measuring  5.6 x 6.9 cm.     Lumbar:  Conus tip at L1. Normal lumbar lordosis. Grade 1 anterolisthesis of L4  and L5. Loss of disc height at L4-5 and L5-S1. Additional degenerative  change including Schmorl's nodes, mild facet hypertrophy, anterior  osteophytic spurring. No loss of vertebral body height. Normal cauda  equina.    Multiple foci of T1 hypointense and STIR/ DWI hyperintense lesions  diffusely throughout the lumbar vertebra and proximal sacrum, and  proximal bilateral ischium.    On a level by level basis, the findings are as follows:    L1-2: No spinal canal or neural foraminal stenosis. Facet arthropathy  bilaterally. Ligamentum flavum thickening.    L2-3: Disc bulge. Facet arthropathy bilaterally. Ligamentum flavum  thickening. Mild bilateral neural foraminal narrowing. No spinal canal  narrowing..    L3-4: Disc bulge. Facet arthropathy bilaterally. Mild bilateral neural  foraminal narrowing. No spinal canal narrowing.    L4-5: Grade 1 anterolisthesis with uncovering of the disc. Facet  arthropathy bilaterally. Ligamentum flavum hypertrophy. Moderate  bilateral neural foraminal narrowing. Moderate to severe spinal canal  narrowing.    L5-S1: Central disc protrusion. Mild bilateral neural foraminal  narrowing. No spinal canal narrowing.    The visualized paraspinous soft tissues are within normal limits.         Impression    IMPRESSION:   1.  Multifocal diffuse thoracolumbar, proximal sacral, and bilateral  ischial lesions concerning for metastasis.  2. Multilevel thoracolumbar spondylosis with moderate-severe spinal  canal narrowing at L4-5.    I have personally reviewed the examination and initial interpretation  and I agree with the findings.    CAITLYN LEE MD         SYSTEM ID:  [id number]   MR Lumbar Spine w/o Contrast    Narrative    EXAM: MR THORACIC SPINE W/O CONTRAST, MR LUMBAR SPINE W/O CONTRAST   7/18/2024 10:56 PM     HISTORY: 85 yo with constant back pain for a week w MGUS and lytic  lesions on t spine       COMPARISON: Thoracic spine CT 7/17/2024, CT AP 9/20/2021    TECHNIQUE: Sagittal T1-weighted, sagittal T2-weighted, sagittal STIR,  sagittal diffusion weighted (with ADC map), and axial T2-weighted  images of the thoracic spine were obtained without intravenous  contrast.    FINDINGS:  12 thoracic vertebra. 5 lumbar vertebra.    Thoracic:  Slight exaggeration of the normal thoracic kyphosis..No loss of disc  height. Spinal cord signal is within normal limits. No significant  spinal canal or neuroforaminal stenosis.     Multiple foci of T1 hypointense and and STIR/DWI hyperintense lesions  diffusely throughout the thoracic vertebra, largest at T11 left aspect  of the vertebral body and extending into the posterior elements,  causing mass effect upon the thecal sac, moderate spinal canal  stenosis and moderate right neural foraminal stenosis..    Multiple renal cysts including one in the left upper pole measuring  5.6 x 6.9 cm.     Lumbar:  Conus tip at L1. Normal lumbar lordosis. Grade 1 anterolisthesis of L4  and L5. Loss of disc height at L4-5 and L5-S1. Additional degenerative  change including Schmorl's nodes, mild facet hypertrophy, anterior  osteophytic spurring. No loss of vertebral body height. Normal cauda  equina.    Multiple foci of T1 hypointense and STIR/ DWI hyperintense lesions  diffusely throughout the lumbar vertebra  and proximal sacrum, and  proximal bilateral ischium.    On a level by level basis, the findings are as follows:    L1-2: No spinal canal or neural foraminal stenosis. Facet arthropathy  bilaterally. Ligamentum flavum thickening.    L2-3: Disc bulge. Facet arthropathy bilaterally. Ligamentum flavum  thickening. Mild bilateral neural foraminal narrowing. No spinal canal  narrowing..    L3-4: Disc bulge. Facet arthropathy bilaterally. Mild bilateral neural  foraminal narrowing. No spinal canal narrowing.    L4-5: Grade 1 anterolisthesis with uncovering of the disc. Facet  arthropathy bilaterally. Ligamentum flavum hypertrophy. Moderate  bilateral neural foraminal narrowing. Moderate to severe spinal canal  narrowing.    L5-S1: Central disc protrusion. Mild bilateral neural foraminal  narrowing. No spinal canal narrowing.    The visualized paraspinous soft tissues are within normal limits.         Impression    IMPRESSION:   1. Multifocal diffuse thoracolumbar, proximal sacral, and bilateral  ischial lesions concerning for metastasis.  2. Multilevel thoracolumbar spondylosis with moderate-severe spinal  canal narrowing at L4-5.    I have personally reviewed the examination and initial interpretation  and I agree with the findings.    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

## 2024-07-20 ENCOUNTER — APPOINTMENT (OUTPATIENT)
Dept: OCCUPATIONAL THERAPY | Facility: CLINIC | Age: 84
DRG: 543 | End: 2024-07-20
Payer: COMMERCIAL

## 2024-07-20 ENCOUNTER — APPOINTMENT (OUTPATIENT)
Dept: PHYSICAL THERAPY | Facility: CLINIC | Age: 84
DRG: 543 | End: 2024-07-20
Payer: COMMERCIAL

## 2024-07-20 LAB
ALBUMIN SERPL BCG-MCNC: 3.4 G/DL (ref 3.5–5.2)
ALP SERPL-CCNC: 167 U/L (ref 40–150)
ALT SERPL W P-5'-P-CCNC: 6 U/L (ref 0–50)
ANION GAP SERPL CALCULATED.3IONS-SCNC: 13 MMOL/L (ref 7–15)
AST SERPL W P-5'-P-CCNC: 21 U/L (ref 0–45)
BASOPHILS # BLD AUTO: 0 10E3/UL (ref 0–0.2)
BASOPHILS NFR BLD AUTO: 1 %
BILIRUB SERPL-MCNC: 0.7 MG/DL
BUN SERPL-MCNC: 26 MG/DL (ref 8–23)
CALCIUM SERPL-MCNC: 10.1 MG/DL (ref 8.8–10.4)
CHLORIDE SERPL-SCNC: 103 MMOL/L (ref 98–107)
CREAT SERPL-MCNC: 0.9 MG/DL (ref 0.51–0.95)
EGFRCR SERPLBLD CKD-EPI 2021: 64 ML/MIN/1.73M2
EOSINOPHIL # BLD AUTO: 0.2 10E3/UL (ref 0–0.7)
EOSINOPHIL NFR BLD AUTO: 3 %
ERYTHROCYTE [DISTWIDTH] IN BLOOD BY AUTOMATED COUNT: 14.8 % (ref 10–15)
GLUCOSE SERPL-MCNC: 97 MG/DL (ref 70–99)
HCO3 SERPL-SCNC: 24 MMOL/L (ref 22–29)
HCT VFR BLD AUTO: 40.9 % (ref 35–47)
HGB BLD-MCNC: 13.5 G/DL (ref 11.7–15.7)
IMM GRANULOCYTES # BLD: 0 10E3/UL
IMM GRANULOCYTES NFR BLD: 1 %
LYMPHOCYTES # BLD AUTO: 1 10E3/UL (ref 0.8–5.3)
LYMPHOCYTES NFR BLD AUTO: 16 %
MCH RBC QN AUTO: 30.1 PG (ref 26.5–33)
MCHC RBC AUTO-ENTMCNC: 33 G/DL (ref 31.5–36.5)
MCV RBC AUTO: 91 FL (ref 78–100)
MONOCYTES # BLD AUTO: 0.8 10E3/UL (ref 0–1.3)
MONOCYTES NFR BLD AUTO: 14 %
NEUTROPHILS # BLD AUTO: 4.1 10E3/UL (ref 1.6–8.3)
NEUTROPHILS NFR BLD AUTO: 65 %
NRBC # BLD AUTO: 0 10E3/UL
NRBC BLD AUTO-RTO: 0 /100
PLATELET # BLD AUTO: 390 10E3/UL (ref 150–450)
POTASSIUM SERPL-SCNC: 4.4 MMOL/L (ref 3.4–5.3)
PROT SERPL-MCNC: 7.4 G/DL (ref 6.4–8.3)
RBC # BLD AUTO: 4.49 10E6/UL (ref 3.8–5.2)
SODIUM SERPL-SCNC: 140 MMOL/L (ref 135–145)
WBC # BLD AUTO: 6.1 10E3/UL (ref 4–11)

## 2024-07-20 PROCEDURE — 250N000013 HC RX MED GY IP 250 OP 250 PS 637

## 2024-07-20 PROCEDURE — 36415 COLL VENOUS BLD VENIPUNCTURE: CPT | Performed by: STUDENT IN AN ORGANIZED HEALTH CARE EDUCATION/TRAINING PROGRAM

## 2024-07-20 PROCEDURE — 97530 THERAPEUTIC ACTIVITIES: CPT | Mod: GP

## 2024-07-20 PROCEDURE — 85025 COMPLETE CBC W/AUTO DIFF WBC: CPT | Performed by: STUDENT IN AN ORGANIZED HEALTH CARE EDUCATION/TRAINING PROGRAM

## 2024-07-20 PROCEDURE — 80053 COMPREHEN METABOLIC PANEL: CPT | Performed by: STUDENT IN AN ORGANIZED HEALTH CARE EDUCATION/TRAINING PROGRAM

## 2024-07-20 PROCEDURE — 120N000002 HC R&B MED SURG/OB UMMC

## 2024-07-20 PROCEDURE — 97535 SELF CARE MNGMENT TRAINING: CPT | Mod: GO

## 2024-07-20 PROCEDURE — 97116 GAIT TRAINING THERAPY: CPT | Mod: GP

## 2024-07-20 PROCEDURE — 250N000013 HC RX MED GY IP 250 OP 250 PS 637: Performed by: STUDENT IN AN ORGANIZED HEALTH CARE EDUCATION/TRAINING PROGRAM

## 2024-07-20 PROCEDURE — 99232 SBSQ HOSP IP/OBS MODERATE 35: CPT | Mod: GC

## 2024-07-20 PROCEDURE — 250N000012 HC RX MED GY IP 250 OP 636 PS 637: Performed by: FAMILY MEDICINE

## 2024-07-20 RX ADMIN — AZATHIOPRINE 75 MG: 50 TABLET ORAL at 08:00

## 2024-07-20 RX ADMIN — OXYCODONE HYDROCHLORIDE 5 MG: 5 TABLET ORAL at 16:45

## 2024-07-20 RX ADMIN — HYDROCHLOROTHIAZIDE 50 MG: 50 TABLET ORAL at 08:01

## 2024-07-20 RX ADMIN — OXYCODONE HYDROCHLORIDE 5 MG: 5 TABLET ORAL at 22:51

## 2024-07-20 RX ADMIN — SPIRONOLACTONE 50 MG: 25 TABLET ORAL at 08:01

## 2024-07-20 RX ADMIN — Medication 12.5 MG: at 08:01

## 2024-07-20 RX ADMIN — ACETAMINOPHEN 650 MG: 325 TABLET, FILM COATED ORAL at 09:44

## 2024-07-20 RX ADMIN — Medication 12.5 MG: at 20:05

## 2024-07-20 RX ADMIN — ATORVASTATIN CALCIUM 20 MG: 20 TABLET, FILM COATED ORAL at 08:01

## 2024-07-20 RX ADMIN — OXYCODONE HYDROCHLORIDE 5 MG: 5 TABLET ORAL at 09:44

## 2024-07-20 RX ADMIN — RIVAROXABAN 20 MG: 10 TABLET, FILM COATED ORAL at 16:43

## 2024-07-20 RX ADMIN — Medication 25 MCG: at 08:01

## 2024-07-20 ASSESSMENT — ACTIVITIES OF DAILY LIVING (ADL)
ADLS_ACUITY_SCORE: 34

## 2024-07-20 NOTE — PROGRESS NOTES
[id number]    Plan of care reviewed with patient.  Overall goal no change  Patient Alert orient X4 . Able to make needs known uses call light approprietly. SBA . LBM 7/19. LP IV SL and patent.   No acute events during this shift.   Plan of care ongoing

## 2024-07-20 NOTE — PROGRESS NOTES
[facility]    Progress Note - [facility]       Date of Admission:  7/17/2024    Today's plan  - discuss bone scan results with the patient  - page neurosurgery today or tomorrow for their official note saying pt is cleared for bone biopsy  - stay in touch with IR re scheduling bone biopsy, possibly during this admisison  - hold xarelto in anticipation of IR procedure tomorrow     Assessment & Plan   Cara Cool is an 84 year old female admitted on 7/17/24 for worsening non-traumatic back pain. Relevant PMHx of osteoporosis, RCC, b/l renal cysts, MGUS, saddle PE with acute cor pulmonale, atrial tachycardia, and paroxysmal afib. Found to have left T12 transverse process lytic lesion consistent with neoplasm, T11 fx, tachycardia (resolved) and junctional rhythm on EKG. Currently, getting worked up for MM and getting T12 biopsy. Hematology, cardiology, neurosurgery, and IR onboard.     # Numerous bony lesions concerning for malignancy with heavy metastatic burden  # MGUS, concern for multiple myeloma  # Mid-back pain  # Osteoporosis (DEXA Feb '24)  # Osteoarthritis  Presented with significant, persistent mid-back pain and found to have T12 lytic lesion and T11 fracture on ED imaging. History of MGUS (diagnosed Dec 2023) and RCC (diagnosed 2020/2021, tx'd with cryoablation without recurrence). Here, has had elevations in free light chains. On imaging (CT aortic survey, MRI thoracolumbar, bone scan) has had evidence of widespread bony metastasis but without clear evidence of primary source.    Consults  - Hematology, recs:              - Biopsy of bony lesion              - Bone scan - completed on 7/19/24              - SPEP, B2 microglobulin, follow-up MRI as below  - Neurosurgery, recs:              - Biopsy of bony lesion              - MRI T & L spine w/ & w/o              - No surgical intervention needed at present  - IR, attempting to  coordinate inpatient bone biopsy     - Tylenol 650 mg q8h PRN (not to exceed 2g in one day due to autoimmune hepatitis)  - Lidocaine patch  - Oxycodone 2.5-5 mg q4h PRN  - Hold PTA tramadol  - PTA Cholecalciferol 800 unit(s) daily  - Daily CBC, CMP     # Paroxysmal Afib  # Tachycardia  # Junctional rhythm  History of Afib, tachycardic to 120s-130s on admission, asymptomatic. Multiple EKGs showed junctional rhythm and ST and T-wave abnormalities not present on last EKG. Troponin slightly elevated on admit, plateau at 27. Echo without evidence of structural or functional change, EF >70%.  Cardiology consulted, recs:  - discontinue digoxin   - start metoprolol 2.5mg BID  - follow up 1-2 months (they will place referral)  - Rivaroxaban  20 mg daily     # Autoimmune hepatitis   # Cholelithiasis  # Bilateral multiple renal cysts  Hepatitis diagnosed in 2017. Stable on azathioprine. Stable LFTs with elevated alk phos, which may be due to bone pathology such as multiple myeloma. Bilateral multiple renal cysts, benign-appearing, seen on abdominal imaging since at least 2021.  - AzaTHIOprine 50 MG tablet daily  - Daily CMP     # Hyperparathyroidism s/p parathyroid adenoma removal  # Hypercalcemia, chronic  Per chart review, calcium elevated since at least 2010, after which she was found to have an elevated PTH which eventually led to discovery of an inferior L parathyroid adenoma which was resected in 2019. Slightly elevated on admit, otherwise WNL.     # History of saddle PE with cor pulmonale, 2021  - PTA Xarelto     Chronic/Stable  # Hypercholesterolemia: Atorvastatin 20 mg daily  # Hypertension: PTA Hydrochlorothiazide, PTA Spironolactone   # Constipation: Miralax, Senna scheduled daily  # Remote hx of TIA (2013) & resultant strabismus    Diet: Combination Diet Regular Diet Adult    DVT Prophylaxis: DOAC  Lundberg Catheter: Not present  Fluids: None  Lines: PIV    Cardiac Monitoring: None  Code Status: Full Code           #  Hypercalcemia: Highest Ca = 10.3 mg/dL in last 2 days, will monitor as appropriate    # Hypoalbuminemia: Lowest albumin = 3.4 g/dL at 7/20/2024  1:09 PM, will monitor as appropriate     # Hypertension: Noted on problem list             Disposition Plan   Expected Discharge Date: discharge after imaging results and recs from teams involved.     The patient's care was discussed with the Attending Physician, Dr. Uriostegui, Chief Resident/Fellow, Patient, and Patient's Family.    Clara Metcalf MD  [facility]  [facility]  Securely message with Vocera (more info)  Text page via [facility] Paging/Directory   See signed in provider for up to date coverage information  ______________________________________________________________________    Interval History   Pertinent overnight events: RAJINDER    Pt denies any new concerns this morning, reports her pain is controlled. No new neurologic findings, denies any new complaints.     We shared the sad news with Cara and her daughter (on the phone) that her bone scan showed multiple mets, meaning that she has cancer but we don't know where the mets are traveling from. They both acknowledged and asked about the next steps. We told them we will do our best to get the bone biopsy scheduled with IR as soon as possible, the results of which will give us better understanding of what type of cancer it is and it's treatment/ management.       Physical Exam   Vital Signs: Temp: 97.7  F (36.5  C) Temp src: Oral BP: (!) 153/95 Pulse: 62   Resp: 18 SpO2: 95 % O2 Device: None (Room air)    Weight: 236 lbs 1.6 oz    Constitutional: awake, alert, cooperative, no apparent distress, and appears stated age  Eyes: Strabismus with significant L sided exotropia, consistent with baseline  Respiratory: No increased work of breathing, good air exchange, clear to auscultation bilaterally, no crackles or wheezing  Cardiovascular: RRR, normal S1 and  S2, no S3 or S4, and no murmur noted      Data     I have personally reviewed the following data over the past 24 hrs:    6.1  \   13.5   / 390     140 103 26.0 (H) /  97   4.4 24 0.90 \     ALT: 6 AST: 21 AP: 167 (H) TBILI: 0.7   ALB: 3.4 (L) TOT PROTEIN: 7.4 LIPASE: N/A       Imaging results reviewed over the past 24 hrs:   Recent Results (from the past 24 hour(s))   MR Thoracic Spine w Contrast    Narrative    EXAM: MR THORACIC SPINE W CONTRAST, MR LUMBAR SPINE W CONTRAST   7/19/2024 11:14 PM     HISTORY: Hx of MGUS, lytic lesion on T12, concern for metastasis vs  primary lesion       COMPARISON: Thoracolumbar MRI 7/18/2024    TECHNIQUE: Axial and sagittal T1 postcontrast images of the thoracic  and lumbar spine obtained.    CONTRAST: 10.7ml gadavist.    FINDINGS:  Reading in conjunction with the thoracal lumbar spine dated 7/18/2024.    Diffuse multifocal thoracolumbar vertebral body enhancing foci  correlating with the areas of T1 hypointense foci seen on MRI from  7/18/2024.  Especially notable areas of enhancement include but not limited to:  T2, predominantly in the posterior vertebral body  T4, predominantly in the posterior vertebral body  T5, predominantly in the posterior vertebral body  T8, predominantly in the right inferior vertebral body  T11, diffusely throughout the vertebral body    L1, predominantly in the right inferior posterior vertebral body  L2, predominantly in the left anterolateral posterior vertebral body    S1, primarily in the right sacral ala  Right ischium, immediately to the right of the sacroiliac joints  Left ischium, involving a significant portion of the left iliac wing      Impression    IMPRESSION:   Multifocal enhancing lesions in the thoracolumbar, proximal sacrum,  and bilateral ischium correlating with the areas of abnormal signal on  the noncontrast thoracolumbar MRI 7/18/2024. These are concerning for  metastasis.    I have personally reviewed the examination and initial  interpretation  and I agree with the findings.    PAYTON STORY MD         SYSTEM ID:  [id number]   MR Lumbar Spine w Contrast    Narrative    EXAM: MR THORACIC SPINE W CONTRAST, MR LUMBAR SPINE W CONTRAST   7/19/2024 11:14 PM     HISTORY: Hx of MGUS, lytic lesion on T12, concern for metastasis vs  primary lesion       COMPARISON: Thoracolumbar MRI 7/18/2024    TECHNIQUE: Axial and sagittal T1 postcontrast images of the thoracic  and lumbar spine obtained.    CONTRAST: 10.7ml gadavist.    FINDINGS:  Reading in conjunction with the thoracal lumbar spine dated 7/18/2024.    Diffuse multifocal thoracolumbar vertebral body enhancing foci  correlating with the areas of T1 hypointense foci seen on MRI from  7/18/2024.  Especially notable areas of enhancement include but not limited to:  T2, predominantly in the posterior vertebral body  T4, predominantly in the posterior vertebral body  T5, predominantly in the posterior vertebral body  T8, predominantly in the right inferior vertebral body  T11, diffusely throughout the vertebral body    L1, predominantly in the right inferior posterior vertebral body  L2, predominantly in the left anterolateral posterior vertebral body    S1, primarily in the right sacral ala  Right ischium, immediately to the right of the sacroiliac joints  Left ischium, involving a significant portion of the left iliac wing      Impression    IMPRESSION:   Multifocal enhancing lesions in the thoracolumbar, proximal sacrum,  and bilateral ischium correlating with the areas of abnormal signal on  the noncontrast thoracolumbar MRI 7/18/2024. These are concerning for  metastasis.    I have personally reviewed the examination and initial interpretation  and I agree with the findings.    PAYTON STORY MD         SYSTEM ID:  [id number]

## 2024-07-20 NOTE — PROGRESS NOTES
"   VS: BP (!) 153/95 (BP Location: Right arm)   Pulse 62   Temp 97.7  F (36.5  C) (Oral)   Resp 18   Ht 1.721 m (5' 7.76\")   Wt 107.1 kg (236 lb 1.6 oz)   SpO2 95%   BMI 36.16 kg/m     O2: Stable on RA, > 90   Output: Voids spontaneous   Last BM: 7/19   Activity: SBA   Skin: Discoloration on the lower extremities   Pain: Managed with PRN meds see MARS              CMS: Intact alert orient X4   Dressing: N/A   Diet: Regular diet   LDA: RPIV SL   Equipment: Personal belongins   Plan:    Additional Info:         "

## 2024-07-20 NOTE — PROGRESS NOTES
"VS: /68 (BP Location: Right arm)   Pulse 63   Temp 98.4  F (36.9  C) (Oral)   Resp 18   Ht 1.721 m (5' 7.76\")   Wt 107.1 kg (236 lb 1.6 oz)   SpO2 95%   BMI 36.16 kg/m      O2: SpO2 stable on RA.  Denies chest pain and SOB.    Output: Voids spontaneously without difficulty to bathroom    Last BM: 7/19/24. Denies abdominal discomfort.    Activity: Up with standby assist. Uses cane.   Skin: Visible skin intact.    Pain: No complaints of pain during the shift.    CMS: Intact, AOx4. Denies numbness and tingling.   Diet: Regular diet. Denies nausea/vomiting.      LDA: Left PIV SL.   Equipment: IV pole, personal belongings,    Plan: Standard precautions maintained / Continue with plan of care. Call light within reach, pt able to make needs known.    Additional Info: MRI lumbar spine with contrast done.      "

## 2024-07-21 ENCOUNTER — APPOINTMENT (OUTPATIENT)
Dept: PHYSICAL THERAPY | Facility: CLINIC | Age: 84
DRG: 543 | End: 2024-07-21
Payer: COMMERCIAL

## 2024-07-21 LAB
ALBUMIN SERPL BCG-MCNC: 3.6 G/DL (ref 3.5–5.2)
ALP SERPL-CCNC: 158 U/L (ref 40–150)
ALT SERPL W P-5'-P-CCNC: 6 U/L (ref 0–50)
ANION GAP SERPL CALCULATED.3IONS-SCNC: 12 MMOL/L (ref 7–15)
AST SERPL W P-5'-P-CCNC: 18 U/L (ref 0–45)
BASOPHILS # BLD AUTO: 0 10E3/UL (ref 0–0.2)
BASOPHILS NFR BLD AUTO: 1 %
BILIRUB SERPL-MCNC: 0.6 MG/DL
BUN SERPL-MCNC: 27.1 MG/DL (ref 8–23)
CALCIUM SERPL-MCNC: 9.7 MG/DL (ref 8.8–10.4)
CHLORIDE SERPL-SCNC: 102 MMOL/L (ref 98–107)
CREAT SERPL-MCNC: 0.86 MG/DL (ref 0.51–0.95)
EGFRCR SERPLBLD CKD-EPI 2021: 67 ML/MIN/1.73M2
EOSINOPHIL # BLD AUTO: 0.2 10E3/UL (ref 0–0.7)
EOSINOPHIL NFR BLD AUTO: 4 %
ERYTHROCYTE [DISTWIDTH] IN BLOOD BY AUTOMATED COUNT: 14.7 % (ref 10–15)
GLUCOSE SERPL-MCNC: 93 MG/DL (ref 70–99)
HCO3 SERPL-SCNC: 25 MMOL/L (ref 22–29)
HCT VFR BLD AUTO: 40.3 % (ref 35–47)
HGB BLD-MCNC: 12.7 G/DL (ref 11.7–15.7)
IMM GRANULOCYTES # BLD: 0 10E3/UL
IMM GRANULOCYTES NFR BLD: 1 %
INR PPP: 1.76 (ref 0.85–1.15)
LYMPHOCYTES # BLD AUTO: 1 10E3/UL (ref 0.8–5.3)
LYMPHOCYTES NFR BLD AUTO: 18 %
MCH RBC QN AUTO: 29 PG (ref 26.5–33)
MCHC RBC AUTO-ENTMCNC: 31.5 G/DL (ref 31.5–36.5)
MCV RBC AUTO: 92 FL (ref 78–100)
MONOCYTES # BLD AUTO: 0.9 10E3/UL (ref 0–1.3)
MONOCYTES NFR BLD AUTO: 16 %
NEUTROPHILS # BLD AUTO: 3.4 10E3/UL (ref 1.6–8.3)
NEUTROPHILS NFR BLD AUTO: 60 %
NRBC # BLD AUTO: 0 10E3/UL
NRBC BLD AUTO-RTO: 0 /100
PLATELET # BLD AUTO: 388 10E3/UL (ref 150–450)
POTASSIUM SERPL-SCNC: 3.9 MMOL/L (ref 3.4–5.3)
PROT SERPL-MCNC: 7.1 G/DL (ref 6.4–8.3)
RBC # BLD AUTO: 4.38 10E6/UL (ref 3.8–5.2)
SODIUM SERPL-SCNC: 139 MMOL/L (ref 135–145)
WBC # BLD AUTO: 5.7 10E3/UL (ref 4–11)

## 2024-07-21 PROCEDURE — 97530 THERAPEUTIC ACTIVITIES: CPT | Mod: GP

## 2024-07-21 PROCEDURE — 250N000013 HC RX MED GY IP 250 OP 250 PS 637

## 2024-07-21 PROCEDURE — 97116 GAIT TRAINING THERAPY: CPT | Mod: GP

## 2024-07-21 PROCEDURE — 36415 COLL VENOUS BLD VENIPUNCTURE: CPT | Performed by: STUDENT IN AN ORGANIZED HEALTH CARE EDUCATION/TRAINING PROGRAM

## 2024-07-21 PROCEDURE — 82565 ASSAY OF CREATININE: CPT | Performed by: STUDENT IN AN ORGANIZED HEALTH CARE EDUCATION/TRAINING PROGRAM

## 2024-07-21 PROCEDURE — 85025 COMPLETE CBC W/AUTO DIFF WBC: CPT | Performed by: STUDENT IN AN ORGANIZED HEALTH CARE EDUCATION/TRAINING PROGRAM

## 2024-07-21 PROCEDURE — 250N000012 HC RX MED GY IP 250 OP 636 PS 637: Performed by: FAMILY MEDICINE

## 2024-07-21 PROCEDURE — 85610 PROTHROMBIN TIME: CPT | Performed by: PHYSICIAN ASSISTANT

## 2024-07-21 PROCEDURE — 120N000002 HC R&B MED SURG/OB UMMC

## 2024-07-21 PROCEDURE — 250N000013 HC RX MED GY IP 250 OP 250 PS 637: Performed by: STUDENT IN AN ORGANIZED HEALTH CARE EDUCATION/TRAINING PROGRAM

## 2024-07-21 PROCEDURE — 99232 SBSQ HOSP IP/OBS MODERATE 35: CPT | Mod: GC

## 2024-07-21 RX ORDER — OXYCODONE HYDROCHLORIDE 5 MG/1
5 TABLET ORAL EVERY 4 HOURS PRN
Status: DISCONTINUED | OUTPATIENT
Start: 2024-07-21 | End: 2024-07-25 | Stop reason: HOSPADM

## 2024-07-21 RX ORDER — OXYCODONE HYDROCHLORIDE 5 MG/1
5 TABLET ORAL DAILY PRN
Status: DISCONTINUED | OUTPATIENT
Start: 2024-07-22 | End: 2024-07-25 | Stop reason: HOSPADM

## 2024-07-21 RX ADMIN — OXYCODONE HYDROCHLORIDE 5 MG: 5 TABLET ORAL at 10:32

## 2024-07-21 RX ADMIN — HYDROCHLOROTHIAZIDE 50 MG: 50 TABLET ORAL at 08:30

## 2024-07-21 RX ADMIN — Medication 12.5 MG: at 08:30

## 2024-07-21 RX ADMIN — ACETAMINOPHEN 650 MG: 325 TABLET, FILM COATED ORAL at 10:32

## 2024-07-21 RX ADMIN — AZATHIOPRINE 75 MG: 50 TABLET ORAL at 08:30

## 2024-07-21 RX ADMIN — Medication 7.5 MG: at 20:35

## 2024-07-21 RX ADMIN — Medication 12.5 MG: at 20:35

## 2024-07-21 RX ADMIN — Medication 25 MCG: at 08:31

## 2024-07-21 RX ADMIN — ACETAMINOPHEN 650 MG: 325 TABLET, FILM COATED ORAL at 20:35

## 2024-07-21 RX ADMIN — ATORVASTATIN CALCIUM 20 MG: 20 TABLET, FILM COATED ORAL at 08:30

## 2024-07-21 RX ADMIN — SPIRONOLACTONE 50 MG: 25 TABLET ORAL at 08:30

## 2024-07-21 ASSESSMENT — ACTIVITIES OF DAILY LIVING (ADL)
ADLS_ACUITY_SCORE: 34
ADLS_ACUITY_SCORE: 34
ADLS_ACUITY_SCORE: 32
ADLS_ACUITY_SCORE: 32
ADLS_ACUITY_SCORE: 34
ADLS_ACUITY_SCORE: 32
ADLS_ACUITY_SCORE: 34
ADLS_ACUITY_SCORE: 34
ADLS_ACUITY_SCORE: 32
ADLS_ACUITY_SCORE: 34
ADLS_ACUITY_SCORE: 32
ADLS_ACUITY_SCORE: 34
ADLS_ACUITY_SCORE: 32
ADLS_ACUITY_SCORE: 34
ADLS_ACUITY_SCORE: 32
ADLS_ACUITY_SCORE: 34
ADLS_ACUITY_SCORE: 34

## 2024-07-21 NOTE — PLAN OF CARE
Problem: Adult Inpatient Plan of Care  Goal: Absence of Hospital-Acquired Illness or Injury  Intervention: Prevent Infection  Recent Flowsheet Documentation  Taken 7/21/2024 0111 by Manan Chirinos, RN  Infection Prevention:   equipment surfaces disinfected   hand hygiene promoted     Problem: Pain Acute  Goal: Optimal Pain Control and Function  Outcome: Not Progressing    VS:  Temp: 99  F (37.2  C) Temp src: Oral BP: 120/64 Pulse: 62   Resp: 20 SpO2: 94 % O2 Device: None (Room air)       O2: SpO2 > 94 and stable on RA. Diminished L/S. Denies chest pain and SOB.    Output: Voids spontaneously without difficulty to bathroom    Last BM: 07/20/2024, denies abdominal discomfort. BS active / passing flatus.    Activity:  SBA / walker   Skin: WDL except, discoloration in lower extremities   Pain: Pain managed with oxycodone   CMS: Intact, AOx4.    Dressing:  none   Diet: Combination Regular diet. Denies nausea/vomiting.    LDA: L PIV SL   Equipment: IV pole, personal belongings, monitor   Plan: standard precautions maintained / Continue with plan of care. Call light within reach, pt able to make needs known.   Plan: IR    Additional Info:  For CURT

## 2024-07-21 NOTE — PLAN OF CARE
"  Problem: Adult Inpatient Plan of Care  Goal: Optimal Comfort and Wellbeing  Outcome: Progressing     Problem: Pain Acute  Goal: Optimal Pain Control and Function  Outcome: Progressing   Goal Outcome Evaluation:     VS: BP (!) 125/101 (BP Location: Right arm, Patient Position: Semi-Hawkins's, Cuff Size: Adult Regular)   Pulse 64   Temp 97.5  F (36.4  C) (Oral)   Resp 17   Ht 1.721 m (5' 7.76\")   Wt 107.1 kg (236 lb 1.6 oz)   SpO2 94%   BMI 36.16 kg/m     O2:  Stable room air   Output: Voids spontaneous    Last BM:    Activity: SBA with walker/cane   Skin: Discoloration on the lower extremities   Pain: PRN med's given see  MARS              CMS: Alert orient X4 calm cooperative with care   Dressing: N/A   Diet: Regular   LDA: LPIV SL   Equipment: Personal belongins at the bedside   Plan: Bone biopsy    Additional Info:  NPO at midnight. Patient is aware                             "

## 2024-07-21 NOTE — PROGRESS NOTES
Brief Neurosurgery Note:    MRI of T and L-spine reviewed with staff. No current role for neurosurgery given intact exam and no neurological weakness. Would recommend involvement of onc/rad-onc after biopsy is completed. Please contact neurosurgery in the event that there is any concern for change in neurological exam or progression of symptoms. Pending IR-biopsy of lesion to drive/dictate treatment.     Discussed with Dr. Tra Kent MD  PGY-2 Department of Neurosurgery  [facility]  Pager: [phone]  On-call: [phone]

## 2024-07-21 NOTE — PROGRESS NOTES
[facility], [place]   07/21/2024  Neurosurgery Progress Note:    Assessment:  84 year old female with history of RCC and MGUS (currently being worked up for multiple myeloma), presenting with back pain and found to have a lytic lesion on T11 associated with compression fracture with minimal height loss and a second lesion at L4. She is currently full strength at all 4 extremities, does not have any sensory deficits, no hyperreflexia, Mendes's or clonus. MRI T and L spine showing diffuse tumor involvement of the spine. Pending biopsy with IR to determine treatment plan.     Plan:  -- No neurosurgical intervention indicated at this time   -- No activity restrictions and bracing needed   -- Neurosurgery will continue to follow  -----------------------------------  Mary Grace Ellison MD, PGY-1  Department of Neurosurgery  Pager: [phone]    Please contact neurosurgery resident on call with questions.    Dial * * [phone], enter [phone] when prompted.   -----------------------------------    Interval History: CYNDIE. Continues to have on and off pain with movement, but comfortable overall.     Objective:   Temp:  [97.7  F (36.5  C)-99  F (37.2  C)] 98  F (36.7  C)  Pulse:  [62-65] 65  Resp:  [18-20] 20  BP: (117-153)/(63-95) 117/63  SpO2:  [94 %-95 %] 95 %  No intake/output data recorded.    Gen: Appears comfortable, NAD  Neurologic:  - Awake and alert   - Follows commands briskly  - Speech fluent, spontaneous. No aphasia or dysarthria.     Del Tr Bi WE WF Gr   R 5 5 5 5 5 5   L 5 5 5 5 5 5    HF KE KF DF PF EHL   R 5 5 5 5 5 5   L 5 5 5 5 5 5     Reflexes 2+ throughout, No Mendes's, no clonus     Sensation intact and symmetric to light touch throughout.

## 2024-07-21 NOTE — PROGRESS NOTES
[facility]    Progress Note - [facility]       Date of Admission:  7/17/2024    Today's Plan:   - page IR to schedule bone biopsy     Assessment & Plan   Cara Cool is an 84 year old female admitted on 7/17/24 for worsening non-traumatic back pain. Relevant PMHx of osteoporosis, RCC, b/l renal cysts, MGUS, saddle PE with acute cor pulmonale, atrial tachycardia, and paroxysmal afib. Found to have left T12 transverse process lytic lesion consistent with neoplasm, T11 fx, tachycardia (resolved) and junctional rhythm on EKG. Currently, getting worked up for MM and getting T12 biopsy. Hematology, cardiology, neurosurgery, and IR onboard.      # Numerous bony lesions concerning for malignancy with heavy metastatic burden  # MGUS, concern for multiple myeloma  # Mid-back pain  # Osteoporosis (DEXA Feb '24)  # Osteoarthritis  Presented with significant, persistent mid-back pain and found to have T12 lytic lesion and T11 fracture on ED imaging. History of MGUS (diagnosed Dec 2023) and RCC (diagnosed 2020/2021, tx'd with cryoablation without recurrence). Here, has had elevations in free light chains. On imaging (CT aortic survey, MRI thoracolumbar, bone scan) has had evidence of widespread bony metastasis but without clear evidence of primary source.    Consults  - Hematology, recs:              - Biopsy of bony lesion              - Bone scan - completed on 7/19/24              - SPEP, B2 microglobulin, follow-up MRI as below  - Neurosurgery, recs:              - Biopsy of bony lesion              - MRI T & L spine w/ & w/o - completed on 7/19/24  - No surgical intervention needed at present, but recommend involvement of onc/rad-onc after biopsy is completed   - IR, attempting to coordinate inpatient bone biopsy     Pain management  - Tylenol 650 mg q8h PRN (not to exceed 2g in one day due to autoimmune hepatitis)  - Lidocaine patch  - Oxycodone 5mg q4h  PRN for moderate pain  - Oxycodone 7.5mg q4h PRN for severe pain  - Oxycodone 5mg 1 hour before physical therapy  - Hold PTA tramadol  - PTA Cholecalciferol 800 unit(s) daily  - Daily CBC, CMP     # Paroxysmal Afib  # Tachycardia  # Junctional rhythm  History of Afib, tachycardic to 120s-130s on admission, asymptomatic. Multiple EKGs showed junctional rhythm and ST and T-wave abnormalities not present on last EKG. Troponin slightly elevated on admit, plateau at 27. Echo without evidence of structural or functional change, EF >70%.  Cardiology consulted, recs:  - discontinue digoxin   - start metoprolol 2.5mg BID  - follow up 1-2 months (they will place referral)  - Rivaroxaban  20 mg daily     # Autoimmune hepatitis   # Cholelithiasis  # Bilateral multiple renal cysts  Hepatitis diagnosed in 2017. Stable on azathioprine. Stable LFTs with elevated alk phos, which may be due to bone pathology such as multiple myeloma. Bilateral multiple renal cysts, benign-appearing, seen on abdominal imaging since at least 2021.  - AzaTHIOprine 50 MG tablet daily  - Daily CMP     # Hyperparathyroidism s/p parathyroid adenoma removal  # Hypercalcemia, chronic  Per chart review, calcium elevated since at least 2010, after which she was found to have an elevated PTH which eventually led to discovery of an inferior L parathyroid adenoma which was resected in 2019. Slightly elevated on admit, otherwise WNL.     # History of saddle PE with cor pulmonale, 2021  - PTA Xarelto      Chronic/Stable  # Hypercholesterolemia: Atorvastatin 20 mg daily  # Hypertension: PTA Hydrochlorothiazide, PTA Spironolactone   # Constipation: Miralax, Senna scheduled daily  # Remote hx of TIA (2013) & resultant strabismus        Diet: Combination Diet Regular Diet Adult    DVT Prophylaxis: DOAC  Lundberg Catheter: Not present  Fluids: None  Lines: PIV   Cardiac Monitoring: None  Code Status: Full Code        Disposition Plan   Discharge after bone biopsy (possibly)  and recs from teams involved.     The patient's care was discussed with the Attending Physician, Dr. Uriostegui .    Clara Metcalf MD  [facility]  [facility]  Securely message with [employer] (more info)  Text page via [employer] Paging/Directory   See signed in provider for up to date coverage information  ______________________________________________________________________    Interval History   Pertinent overnight events : NAEO    Pt reports worsening pain with movement. No other concerns for now.     Physical Exam   Vital Signs: Temp: 99  F (37.2  C) Temp src: Oral BP: 120/64 Pulse: 62   Resp: 20 SpO2: 94 % O2 Device: None (Room air)      Constitutional: awake, alert, cooperative, no apparent distress, and appears stated age  Eyes: Strabismus with significant L sided exotropia, consistent with baseline  Respiratory: No increased work of breathing, good air exchange, clear to auscultation bilaterally, no crackles or wheezing  Cardiovascular: RRR, normal S1 and S2, no S3 or S4, and no murmur noted      Data     I have personally reviewed the following data over the past 24 hrs:    5.7  \   12.7   / 388     139 102 27.1 (H) /  93   3.9 25 0.86 \     ALT: 6 AST: 18 AP: 158 (H) TBILI: 0.6   ALB: 3.6 TOT PROTEIN: 7.1 LIPASE: N/A     INR:  1.76 (H) PTT:  N/A   D-dimer:  N/A Fibrinogen:  N/A

## 2024-07-22 ENCOUNTER — APPOINTMENT (OUTPATIENT)
Dept: PHYSICAL THERAPY | Facility: CLINIC | Age: 84
DRG: 543 | End: 2024-07-22
Payer: COMMERCIAL

## 2024-07-22 ENCOUNTER — APPOINTMENT (OUTPATIENT)
Dept: OCCUPATIONAL THERAPY | Facility: CLINIC | Age: 84
DRG: 543 | End: 2024-07-22
Payer: COMMERCIAL

## 2024-07-22 LAB
ALBUMIN SERPL BCG-MCNC: 3.7 G/DL (ref 3.5–5.2)
ALBUMIN UR-MCNC: NEGATIVE MG/DL
ALP SERPL-CCNC: 163 U/L (ref 40–150)
ALT SERPL W P-5'-P-CCNC: 6 U/L (ref 0–50)
ANION GAP SERPL CALCULATED.3IONS-SCNC: 11 MMOL/L (ref 7–15)
APPEARANCE UR: ABNORMAL
AST SERPL W P-5'-P-CCNC: 19 U/L (ref 0–45)
BACTERIA #/AREA URNS HPF: ABNORMAL /HPF
BASOPHILS # BLD AUTO: 0 10E3/UL (ref 0–0.2)
BASOPHILS NFR BLD AUTO: 1 %
BILIRUB SERPL-MCNC: 0.6 MG/DL
BILIRUB UR QL STRIP: NEGATIVE
BUN SERPL-MCNC: 27.8 MG/DL (ref 8–23)
CALCIUM SERPL-MCNC: 10 MG/DL (ref 8.8–10.4)
CHLORIDE SERPL-SCNC: 102 MMOL/L (ref 98–107)
COLOR UR AUTO: YELLOW
CREAT SERPL-MCNC: 0.91 MG/DL (ref 0.51–0.95)
EGFRCR SERPLBLD CKD-EPI 2021: 63 ML/MIN/1.73M2
EOSINOPHIL # BLD AUTO: 0.2 10E3/UL (ref 0–0.7)
EOSINOPHIL NFR BLD AUTO: 4 %
ERYTHROCYTE [DISTWIDTH] IN BLOOD BY AUTOMATED COUNT: 14.7 % (ref 10–15)
GLUCOSE SERPL-MCNC: 102 MG/DL (ref 70–99)
GLUCOSE UR STRIP-MCNC: NEGATIVE MG/DL
HCO3 SERPL-SCNC: 26 MMOL/L (ref 22–29)
HCT VFR BLD AUTO: 39.8 % (ref 35–47)
HGB BLD-MCNC: 13.2 G/DL (ref 11.7–15.7)
HGB UR QL STRIP: NEGATIVE
IMM GRANULOCYTES # BLD: 0.1 10E3/UL
IMM GRANULOCYTES NFR BLD: 1 %
KETONES UR STRIP-MCNC: ABNORMAL MG/DL
LEUKOCYTE ESTERASE UR QL STRIP: NEGATIVE
LYMPHOCYTES # BLD AUTO: 0.8 10E3/UL (ref 0.8–5.3)
LYMPHOCYTES NFR BLD AUTO: 13 %
MCH RBC QN AUTO: 29.9 PG (ref 26.5–33)
MCHC RBC AUTO-ENTMCNC: 33.2 G/DL (ref 31.5–36.5)
MCV RBC AUTO: 90 FL (ref 78–100)
MONOCYTES # BLD AUTO: 0.8 10E3/UL (ref 0–1.3)
MONOCYTES NFR BLD AUTO: 14 %
NEUTROPHILS # BLD AUTO: 3.9 10E3/UL (ref 1.6–8.3)
NEUTROPHILS NFR BLD AUTO: 67 %
NITRATE UR QL: POSITIVE
NRBC # BLD AUTO: 0 10E3/UL
NRBC BLD AUTO-RTO: 0 /100
PH UR STRIP: 6 [PH] (ref 5–7)
PLATELET # BLD AUTO: 409 10E3/UL (ref 150–450)
POTASSIUM SERPL-SCNC: 3.9 MMOL/L (ref 3.4–5.3)
PROT SERPL-MCNC: 7.4 G/DL (ref 6.4–8.3)
RBC # BLD AUTO: 4.41 10E6/UL (ref 3.8–5.2)
RBC URINE: <1 /HPF
SODIUM SERPL-SCNC: 139 MMOL/L (ref 135–145)
SP GR UR STRIP: 1.02 (ref 1–1.03)
SQUAMOUS EPITHELIAL: 3 /HPF
TRANSITIONAL EPI: <1 /HPF
UROBILINOGEN UR STRIP-MCNC: 2 MG/DL
WBC # BLD AUTO: 5.7 10E3/UL (ref 4–11)
WBC URINE: 1 /HPF

## 2024-07-22 PROCEDURE — 250N000013 HC RX MED GY IP 250 OP 250 PS 637

## 2024-07-22 PROCEDURE — 97535 SELF CARE MNGMENT TRAINING: CPT | Mod: GO

## 2024-07-22 PROCEDURE — 99232 SBSQ HOSP IP/OBS MODERATE 35: CPT | Mod: GC

## 2024-07-22 PROCEDURE — 250N000013 HC RX MED GY IP 250 OP 250 PS 637: Performed by: STUDENT IN AN ORGANIZED HEALTH CARE EDUCATION/TRAINING PROGRAM

## 2024-07-22 PROCEDURE — 36415 COLL VENOUS BLD VENIPUNCTURE: CPT | Performed by: STUDENT IN AN ORGANIZED HEALTH CARE EDUCATION/TRAINING PROGRAM

## 2024-07-22 PROCEDURE — 87086 URINE CULTURE/COLONY COUNT: CPT | Performed by: STUDENT IN AN ORGANIZED HEALTH CARE EDUCATION/TRAINING PROGRAM

## 2024-07-22 PROCEDURE — 120N000002 HC R&B MED SURG/OB UMMC

## 2024-07-22 PROCEDURE — 250N000012 HC RX MED GY IP 250 OP 636 PS 637: Performed by: FAMILY MEDICINE

## 2024-07-22 PROCEDURE — 85025 COMPLETE CBC W/AUTO DIFF WBC: CPT | Performed by: STUDENT IN AN ORGANIZED HEALTH CARE EDUCATION/TRAINING PROGRAM

## 2024-07-22 PROCEDURE — 97110 THERAPEUTIC EXERCISES: CPT | Mod: GP

## 2024-07-22 PROCEDURE — 97116 GAIT TRAINING THERAPY: CPT | Mod: GP

## 2024-07-22 PROCEDURE — 80053 COMPREHEN METABOLIC PANEL: CPT | Performed by: STUDENT IN AN ORGANIZED HEALTH CARE EDUCATION/TRAINING PROGRAM

## 2024-07-22 PROCEDURE — 81001 URINALYSIS AUTO W/SCOPE: CPT | Performed by: STUDENT IN AN ORGANIZED HEALTH CARE EDUCATION/TRAINING PROGRAM

## 2024-07-22 PROCEDURE — 87186 SC STD MICRODIL/AGAR DIL: CPT | Performed by: STUDENT IN AN ORGANIZED HEALTH CARE EDUCATION/TRAINING PROGRAM

## 2024-07-22 RX ADMIN — SPIRONOLACTONE 50 MG: 25 TABLET ORAL at 08:02

## 2024-07-22 RX ADMIN — Medication 25 MCG: at 08:03

## 2024-07-22 RX ADMIN — ATORVASTATIN CALCIUM 20 MG: 20 TABLET, FILM COATED ORAL at 08:03

## 2024-07-22 RX ADMIN — Medication 12.5 MG: at 20:43

## 2024-07-22 RX ADMIN — Medication 7.5 MG: at 08:07

## 2024-07-22 RX ADMIN — SENNOSIDES 8.6 MG: 8.6 TABLET, FILM COATED ORAL at 20:45

## 2024-07-22 RX ADMIN — Medication 12.5 MG: at 08:03

## 2024-07-22 RX ADMIN — HYDROCHLOROTHIAZIDE 50 MG: 50 TABLET ORAL at 08:02

## 2024-07-22 RX ADMIN — Medication 7.5 MG: at 03:24

## 2024-07-22 RX ADMIN — Medication 7.5 MG: at 20:42

## 2024-07-22 RX ADMIN — AZATHIOPRINE 75 MG: 50 TABLET ORAL at 08:02

## 2024-07-22 ASSESSMENT — ACTIVITIES OF DAILY LIVING (ADL)
ADLS_ACUITY_SCORE: 31
ADLS_ACUITY_SCORE: 31
ADLS_ACUITY_SCORE: 32
ADLS_ACUITY_SCORE: 31
ADLS_ACUITY_SCORE: 32
ADLS_ACUITY_SCORE: 31
ADLS_ACUITY_SCORE: 31
ADLS_ACUITY_SCORE: 32
ADLS_ACUITY_SCORE: 31
ADLS_ACUITY_SCORE: 32
ADLS_ACUITY_SCORE: 31
ADLS_ACUITY_SCORE: 32
ADLS_ACUITY_SCORE: 31
ADLS_ACUITY_SCORE: 32
ADLS_ACUITY_SCORE: 31
ADLS_ACUITY_SCORE: 32
ADLS_ACUITY_SCORE: 31
ADLS_ACUITY_SCORE: 32

## 2024-07-22 NOTE — PLAN OF CARE
Problem: Adult Inpatient Plan of Care  Goal: Absence of Hospital-Acquired Illness or Injury  Intervention: Prevent Infection  Recent Flowsheet Documentation  Taken 7/22/2024 0023 by Manan Chirinos, RN  Infection Prevention:   environmental surveillance performed   equipment surfaces disinfected     Problem: Pain Acute  Goal: Optimal Pain Control and Function  Outcome: Not Progressing    VS:  Temp: 97.7  F (36.5  C) Temp src: Oral BP: 118/75 Pulse: 58   Resp: 18 SpO2: 97 % O2 Device: None (Room air)     O2: SpO2 > 97 and stable on RA. Diminished L/S. Denies chest pain and SOB.    Output: Voids spontaneously without difficulty to bathroom    Last BM: Continent, denies abdominal discomfort. BS active / passing flatus.    Activity:  SBA / walker   Skin: WDL except, discoloration in lower extremities   Pain: Pain managed with oxycodone   CMS: Intact, AOx4.    Dressing:  none   Diet: NPO Denies nausea/vomiting.    LDA: L PIV SL   Equipment: IV pole, personal belongings, monitor   Plan: standard precautions maintained / Continue with plan of care. Call light within reach, pt able to make needs known.   Plan: IR    Additional Info:  For UA  8630 reminded the patient to be NPO  0040 offered pain medication but patient refused

## 2024-07-22 NOTE — PLAN OF CARE
"Goal Outcome Evaluation:  0700-1930      Plan of Care Reviewed With: patient    Overall Patient Progress: no change    Outcome evaluation: Pt is A&Ox4. Calm, pleasant, and cooperative with care. No complaints of SOB, nausea, tingling, numbness, nor chest pain. Continent of bowel and bladder. Independent with walker. Steady on her feet. LPIV SL.     Shift Updates:   - Had been NPO since 0000 (7/22), procedure did not occur today-- tried contacting multiple providers to find out information about the procedure various times throughout shift. NPO status discontinued during shift and will restart at 0000 (7/23).   - PT and OT visited today  - UA was collected and sent to the lab  - C/O pain. Intervention: PRN oxy    /80 (BP Location: Right arm)   Pulse 66   Temp 97.7  F (36.5  C) (Oral)   Resp 18   Ht 1.721 m (5' 7.76\")   Wt 107.1 kg (236 lb 1.6 oz)   SpO2 96%   BMI 36.16 kg/m      Plan: TBD, waiting for biopsy procedure. Continue POC.    "

## 2024-07-22 NOTE — DISCHARGE SUMMARY
[facility]  Discharge Summary - Medicine & Pediatrics       Date of Admission:  7/17/2024  Date of Discharge:  7/25/2024  Discharging Provider: Esau Johnson MD  Discharge Service: [facility] Medicine Service    Discharge Diagnoses   # Numerous bony lesions concerning for malignancy with heavy metastatic burden  # MGUS, low concern for multiple myeloma  # Mid-back pain  # Osteoporosis  # Osteoarthritis  #UTI   #Urine culture positive for Escherichia coli   # Paroxysmal Afib  # Tachycardia  # Junctional rhythm  # Autoimmune hepatitis   # Cholelithiasis  # Bilateral multiple renal cysts  # Hyperparathyroidism s/p parathyroid adenoma removal  # Hypercalcemia, chronic  # History of saddle PE with cor pulmonale, 2021    Follow-ups Needed After Discharge   - Please follow up with your PCP, STACY JEAN-BAPTISTE,  tomorrow as scheduled       - Ensure is able to use Lovenox for bridging till biopsy. If biopsy delayed past Tuesday, will need additional dosages  - Please follow up with Neuroradiology for Biopsy outpatient (they will schedule), likely Tuesday 7/30  - Please follow up with Cardiology in 1-2 months (Cards will place referral).   - Please follow up with Hematology in 6 months.     Discharge Disposition   Discharged to home  Condition at discharge: Stable    Hospital Course   Cara Cool is an 84 year old female admitted on 7/17/24 for worsening non-traumatic back pain. She has relevant PMHx of osteoporosis, RCC, b/l renal cysts, MGUS, saddle PE with acute cor pulmonale, atrial tachycardia, and paroxysmal afib. The following problems were addressed during the hospitalization:    # Numerous bony lesions concerning for malignancy with heavy metastatic burden  # MGUS, low concern for multiple myeloma  # Mid-back pain  # Osteoporosis (DEXA Feb '24)  # Osteoarthritis  Presented with significant, persistent mid-back pain and found to have T12 lytic lesion and T11 fracture  on ED imaging. History of MGUS (diagnosed Dec 2023) and RCC (diagnosed 2020/2021, tx'd with cryoablation without recurrence). Here, has had elevations in free light chains. On imaging (CT aortic survey, MRI thoracolumbar, bone scan) has had evidence of widespread bony metastasis on thoracolumbar spine, ribs, and possibly L femur w/o clear evidence of primary source. Unfortunately, patient was not able to completed CT guided biopsy during hospitalization. Neurosurgery / Interventional Radiology recommended outpatient follow up for the bone biopsy. Anticoagulation discussion as below    - Neurosurgery, recommendation:              - Follow up with Neuroradiology for Biopsy of bony lesion (they will schedule)  - No surgical intervention needed at present, but recommend involvement of onc/rad-onc after biopsy is completed (referral given)    Pain management  - Oxycodone 5 mg q4h PRN QTY 9      #UTI   #Urine culture positive for Escherichia coli   UA positive for Nitrite and negative for LE. Urine culture growing >100,000 E.Coli.  Asymptomatic.   - Nitrofurantoin 100 mg BID for 5 days (7/24- 7/29)         # Paroxysmal Afib  # Tachycardia  # Junctional rhythm  History of Afib, tachycardic to 120s-130s on admission, asymptomatic. Multiple EKGs showed junctional rhythm and ST and T-wave abnormalities not present on last EKG. Troponin slightly elevated on admit, plateau at 27. Echo without evidence of structural or functional change, EF >70%. Cardiology consulted.   - DISCONTINUE digoxin  - Take metoprolol 2.5mg BID  - Stop xarelto 20mg daily      # Autoimmune hepatitis   # Cholelithiasis  # Bilateral multiple renal cysts  Hepatitis diagnosed in 2017. Stable on azathioprine. Stable LFTs with elevated alk phos. B/l multiple renal cysts, benign-appearing, seen on abdominal imaging since at least 2021.  - Take AzaTHIOprine 50 MG tablet daily     # Hyperparathyroidism s/p parathyroid adenoma removal  # Hypercalcemia,  chronic  Per chart review, calcium elevated since at least 2010 because of L parathyroid adenoma, resected in 2019. Slightly elevated on admit, otherwise WNL.     # History of saddle PE with cor pulmonale, 2021  - Neurology concerned about DOAC prior to biopsy due to location of biopsy (spine)  - Did risk/benefit discussion with patient. Discussed holding Xarelto until post biopsy, continuing and stop 48 hours before, or bridge with Lovenox. With shared decision making elected for bridging with lovenox.   -  PTA Xarelto   - Take Lovenox 100 mg/ml subcutaneous for 3 days      Chronic/Stable  # Hypercholesterolemia: Atorvastatin 20 mg daily  # Hypertension: PTA Hydrochlorothiazide, PTA Spironolactone   # Constipation: PTA Miralax  # Remote hx of TIA (2013) & resultant strabismus      Consultations This Hospital Stay   ONCOLOGY ADULT IP CONSULT  PHYSICAL THERAPY ADULT IP CONSULT  OCCUPATIONAL THERAPY ADULT IP CONSULT  MEDICATION HISTORY IP PHARMACY CONSULT  CARDIOLOGY GENERAL ADULT IP CONSULT  HEMATOLOGY ADULT IP CONSULT  INTERVENTIONAL RADIOLOGY ADULT/PEDS IP CONSULT  NEUROSURGERY ADULT IP CONSULT  NURSING TO CONSULT FOR VASCULAR ACCESS CARE IP CONSULT  NEUROSURGERY ADULT IP CONSULT    Code Status   Full Code       The patient was discussed with MD [facility]  [facility] MED SURG  [address] [place] [address]  Phone: [phone]  Fax: [phone]  ______________________________________________________________________    Physical Exam   Vital Signs: Temp: 97.7  F (36.5  C) Temp src: Oral BP: 136/80 Pulse: 66   Resp: 18 SpO2: 96 % O2 Device: None (Room air)    Weight: 236 lbs 1.6 oz    Constitutional: awake, alert, cooperative, no apparent distress, and appears stated age  Eyes: Strabismus with significant L sided exotropia, consistent with baseline  Respiratory: No increased work of breathing, good air exchange, clear to auscultation  bilaterally, no crackles or wheezing  Cardiovascular: RRR, normal S1 and S2, no S3 or S4, and no murmur noted  Abdominal: Normal bowel sounds. No tenderness to palpation throughout all quadrants.          Primary Care Physician   STACY JEAN-BAPTISTE    Discharge Orders       Significant Results and Procedures   Most Recent 3 CBC's:  Recent Labs   Lab Test 07/25/24  0718 07/24/24  0742 07/23/24  0558   WBC 6.2 5.7 5.5   HGB 14.1 13.9 13.2   MCV 91 91 91   * 449 428     Most Recent 3 BMP's:  Recent Labs   Lab Test 07/25/24  0718 07/24/24  0742 07/23/24  0558    140 140   POTASSIUM 4.2 4.3 4.2   CHLORIDE 101 103 102   CO2 27 26 27   BUN 25.9* 27.5* 28.5*   CR 0.92 0.92 0.91   ANIONGAP 11 11 11   TREVOR 10.4 10.5* 10.0   GLC 97 94 97   ,   Results for orders placed or performed during the hospital encounter of 07/17/24   CT Thoracic Spine Reconstructed    Narrative    EXAM: CT THORACIC SPINE RECONSTRUCTED  LOCATION: [facility]  DATE: 7/17/2024    INDICATION: central lower thoracic back pain, reproducible; non traumatic, reproducible  COMPARISON: None.  TECHNIQUE: Routine CT Thoracic Spine without IV contrast. Multiplanar reformats. Dose reduction techniques were used.     FINDINGS:  VERTEBRA: Fracture of T11 with slight vertebral body height loss. There is suggestion of underlying lesion in the left pedicle extending into vertebral bodies and posterior elements as well as into adjacent soft tissues. MRI thoracic spine without and   with contrast recommended; providing patient is MRI compatible. Mild wedging of a few mid thoracic vertebral bodies with mildly exaggerated thoracic kyphosis. Upper left and lower right thoracic curve.    CANAL/FORAMINA: Mild to moderate degenerative changes without significant canal narrowing at any level. Multilevel mild to moderate neural foraminal narrowing.    PARASPINAL: Probable 22 mm nodule posterior aspect left thyroid gland; not  "well-visualized ultrasound recommended, if not done previously.      Impression    IMPRESSION:  1.  Fracture of T11 with slight vertebral body height loss. There is suggestion of underlying lesion in the left pedicle extending into vertebral bodies and posterior elements as well as into adjacent soft tissues.   2.  MRI thoracic spine without and with contrast recommended; providing patient is MRI compatible.  3.  No definite other fractures.     CT Aortic Survey w Contrast    Addendum: 7/18/2024    ADDITIONAL INDICATIONS ADDENDUM:    Received a request from the clinical service for additional review of the images to evaluate \"metastatic burden\". Images were reviewed in detail. The lumbar vertebra, pelvis, and sacrum are demineralized, however no other lytic or destructive bone   lesions are identified. With regard to a site of primary malignancy, imaged portions of the breasts show no nodules. There are no concerning lung nodules or solid renal lesions. No potential source of primary neoplasm is identified in the chest, abdomen,   or pelvis.    These findings were discussed with Dr. Uriostegui by telephone 7/18/2024 3:21 PM CDT.    END ADDENDUM       Narrative    EXAM: CT AORTIC SURVEY W CONTRAST  LOCATION: [facility]  DATE: 7/17/2024    INDICATION: back pain worsening x1 week; hx saddle PE, tobacco use  COMPARISON: No prior chest imaging; CT of the abdomen and pelvis with contrast 9/28/2021  TECHNIQUE: CT angiogram chest abdomen pelvis during arterial phase of injection of IV contrast. 2D and 3D MIP reconstructions were performed by the CT technologist. Dose reduction techniques were used.   CONTRAST: 90mL Isovue 370    FINDINGS:   CT ANGIOGRAM CHEST, ABDOMEN, AND PELVIS: The main pulmonary artery is normal caliber. There are no pulmonary artery filling defects. The pulmonary arteries taper normally as they extend towards the pleura. Minimal calcification of the aortic " root.   Sinotubular junction is preserved. No thoracic aortic aneurysm. 2 vessel arch anatomy. Mixed attenuation plaque is present in the arch and descending aorta. No acute aortic syndrome.    Mixed attenuation plaque is present in the normal caliber abdominal aorta and in the common iliac arteries. The and celiac axis, SMA, and ANNETTE are patent. No stenoses of the origins or proximal segments of the renal arteries.    LUNGS AND PLEURA: Upper lung predominant emphysema. There is a circumscribed, polygonal nodule along the lateral costal pleura of the right upper lobe measuring 5-6 mm (series 4, image 98) which has typical features of an intrapulmonary lymph node. No   other lung nodules. Trachea and central airways are patent. No bronchial wall thickening or bronchiectasis. No pleural effusions.    MEDIASTINUM: Cardiac chambers are normal in size. No pericardial effusion. No enlarged mediastinal or hilar lymph nodes. The esophagus is decompressed. Heterogeneous thyroid gland, but no actionable thyroid nodule.    CORONARY ARTERY CALCIFICATION: Severe.    HEPATOBILIARY: Slightly lobulated liver contour. Small calcified stones layer into the fundus of the gallbladder. No gallbladder wall thickening or pericholecystic inflammation. No bile duct enlargement.    PANCREAS: Normal.    SPLEEN: Spleen is normal in size. There are several embolic coils along the course of the splenic artery in the left upper quadrant.    ADRENAL GLANDS: Normal.    KIDNEYS/BLADDER: No significant mass, stones, or hydronephrosis. There are simple or benign cysts. No follow up is needed.    BOWEL: Mild diverticulosis of the colon in the left lower quadrant. No dilated bowel or bowel wall thickening.    LYMPH NODES: There are no enlarged lymph nodes in the abdomen or pelvis.    PELVIC ORGANS: Status post hysterectomy. No pelvic mass.    MUSCULOSKELETAL: Generalized bone demineralization. There is a lytic lesion related to the left transverse  process of T12 (series 4, image 225). Status post left hip arthroplasty. No periprosthetic fractures. Fibrofatty thickening deep to the latissimus   muscles bilaterally consistent with mild elastofibroma dorsi. Small fat-containing umbilical hernia. Previous abdominoplasty.      Impression    IMPRESSION:    1.  No acute pulmonary embolism or acute aortic syndrome.  2.  Focal lytic lesion associated with the left T12 transverse process consistent with neoplasm, either representing a myelomatous lesion or lytic metastasis.  3.  Diverticulosis of the distal colon but no diverticulitis.  4.  Cholelithiasis.     NM Bone Scan Whole Body    Narrative    EXAM: NM BONE SCAN WHOLE BODY 7/19/2024 12:36 PM     HISTORY: Pt w/ relevant hx of RCC & MGUS came in with worsening  back pain. Imaging shows T12 transverse process lytic lesion  concerning for multiple myeloma.    ADDITIONAL INFORMATION: None.    COMPARISON: Abdomen/pelvis CT with contrast 9/28/2021. Thoracic spine  CT 7/17/2024. Thoracolumbar spine MRI 7/18/2024.    TECHNIQUE: The patient received 22.6 mCi of Tc-99m MDP intravenously.  Whole body bone images were obtained at 3 hours.    FINDINGS: Numerous foci of tracer uptake in the bilateral ribs,  thoracolumbar spine, and left femoral diaphysis are present. Normal  soft tissue contours.   Photopenia along bilateral knee prostheses.  Expected tracer uptake in the kidneys and the urinary bladder.       Impression    IMPRESSION: Multifocal foci of uptake involving the thoracolumbar  spine, ribs, and possibly the left femur suspicious for metastases.    I have personally reviewed the examination and initial interpretation  and I agree with the findings.    SHERYL AN MD         SYSTEM ID:  [id number]   MR Thoracic Spine w/o Contrast    Narrative    EXAM: MR THORACIC SPINE W/O CONTRAST, MR LUMBAR SPINE W/O CONTRAST   7/18/2024 10:56 PM     HISTORY: 85 yo with constant back pain for a week w MGUS and lytic  lesions on t  spine       COMPARISON: Thoracic spine CT 7/17/2024, CT AP 9/20/2021    TECHNIQUE: Sagittal T1-weighted, sagittal T2-weighted, sagittal STIR,  sagittal diffusion weighted (with ADC map), and axial T2-weighted  images of the thoracic spine were obtained without intravenous  contrast.    FINDINGS:  12 thoracic vertebra. 5 lumbar vertebra.    Thoracic:  Slight exaggeration of the normal thoracic kyphosis..No loss of disc  height. Spinal cord signal is within normal limits. No significant  spinal canal or neuroforaminal stenosis.     Multiple foci of T1 hypointense and and STIR/DWI hyperintense lesions  diffusely throughout the thoracic vertebra, largest at T11 left aspect  of the vertebral body and extending into the posterior elements,  causing mass effect upon the thecal sac, moderate spinal canal  stenosis and moderate right neural foraminal stenosis..    Multiple renal cysts including one in the left upper pole measuring  5.6 x 6.9 cm.     Lumbar:  Conus tip at L1. Normal lumbar lordosis. Grade 1 anterolisthesis of L4  and L5. Loss of disc height at L4-5 and L5-S1. Additional degenerative  change including Schmorl's nodes, mild facet hypertrophy, anterior  osteophytic spurring. No loss of vertebral body height. Normal cauda  equina.    Multiple foci of T1 hypointense and STIR/ DWI hyperintense lesions  diffusely throughout the lumbar vertebra and proximal sacrum, and  proximal bilateral ischium.    On a level by level basis, the findings are as follows:    L1-2: No spinal canal or neural foraminal stenosis. Facet arthropathy  bilaterally. Ligamentum flavum thickening.    L2-3: Disc bulge. Facet arthropathy bilaterally. Ligamentum flavum  thickening. Mild bilateral neural foraminal narrowing. No spinal canal  narrowing..    L3-4: Disc bulge. Facet arthropathy bilaterally. Mild bilateral neural  foraminal narrowing. No spinal canal narrowing.    L4-5: Grade 1 anterolisthesis with uncovering of the disc.  Facet  arthropathy bilaterally. Ligamentum flavum hypertrophy. Moderate  bilateral neural foraminal narrowing. Moderate to severe spinal canal  narrowing.    L5-S1: Central disc protrusion. Mild bilateral neural foraminal  narrowing. No spinal canal narrowing.    The visualized paraspinous soft tissues are within normal limits.         Impression    IMPRESSION:   1. Multifocal diffuse thoracolumbar, proximal sacral, and bilateral  ischial lesions concerning for metastasis.  2. Multilevel thoracolumbar spondylosis with moderate-severe spinal  canal narrowing at L4-5.    I have personally reviewed the examination and initial interpretation  and I agree with the findings.    CAITLYN LEE MD         SYSTEM ID:  [id number]   MR Lumbar Spine w/o Contrast    Narrative    EXAM: MR THORACIC SPINE W/O CONTRAST, MR LUMBAR SPINE W/O CONTRAST   7/18/2024 10:56 PM     HISTORY: 85 yo with constant back pain for a week w MGUS and lytic  lesions on t spine       COMPARISON: Thoracic spine CT 7/17/2024, CT AP 9/20/2021    TECHNIQUE: Sagittal T1-weighted, sagittal T2-weighted, sagittal STIR,  sagittal diffusion weighted (with ADC map), and axial T2-weighted  images of the thoracic spine were obtained without intravenous  contrast.    FINDINGS:  12 thoracic vertebra. 5 lumbar vertebra.    Thoracic:  Slight exaggeration of the normal thoracic kyphosis..No loss of disc  height. Spinal cord signal is within normal limits. No significant  spinal canal or neuroforaminal stenosis.     Multiple foci of T1 hypointense and and STIR/DWI hyperintense lesions  diffusely throughout the thoracic vertebra, largest at T11 left aspect  of the vertebral body and extending into the posterior elements,  causing mass effect upon the thecal sac, moderate spinal canal  stenosis and moderate right neural foraminal stenosis..    Multiple renal cysts including one in the left upper pole measuring  5.6 x 6.9 cm.     Lumbar:  Conus tip at L1. Normal lumbar  lordosis. Grade 1 anterolisthesis of L4  and L5. Loss of disc height at L4-5 and L5-S1. Additional degenerative  change including Schmorl's nodes, mild facet hypertrophy, anterior  osteophytic spurring. No loss of vertebral body height. Normal cauda  equina.    Multiple foci of T1 hypointense and STIR/ DWI hyperintense lesions  diffusely throughout the lumbar vertebra and proximal sacrum, and  proximal bilateral ischium.    On a level by level basis, the findings are as follows:    L1-2: No spinal canal or neural foraminal stenosis. Facet arthropathy  bilaterally. Ligamentum flavum thickening.    L2-3: Disc bulge. Facet arthropathy bilaterally. Ligamentum flavum  thickening. Mild bilateral neural foraminal narrowing. No spinal canal  narrowing..    L3-4: Disc bulge. Facet arthropathy bilaterally. Mild bilateral neural  foraminal narrowing. No spinal canal narrowing.    L4-5: Grade 1 anterolisthesis with uncovering of the disc. Facet  arthropathy bilaterally. Ligamentum flavum hypertrophy. Moderate  bilateral neural foraminal narrowing. Moderate to severe spinal canal  narrowing.    L5-S1: Central disc protrusion. Mild bilateral neural foraminal  narrowing. No spinal canal narrowing.    The visualized paraspinous soft tissues are within normal limits.         Impression    IMPRESSION:   1. Multifocal diffuse thoracolumbar, proximal sacral, and bilateral  ischial lesions concerning for metastasis.  2. Multilevel thoracolumbar spondylosis with moderate-severe spinal  canal narrowing at L4-5.    I have personally reviewed the examination and initial interpretation  and I agree with the findings.    CAITLYN LEE MD         SYSTEM ID:  [id number]   MR Lumbar Spine w Contrast    Narrative    EXAM: MR THORACIC SPINE W CONTRAST, MR LUMBAR SPINE W CONTRAST   7/19/2024 11:14 PM     HISTORY: Hx of MGUS, lytic lesion on T12, concern for metastasis vs  primary lesion       COMPARISON: Thoracolumbar MRI 7/18/2024    TECHNIQUE:  Axial and sagittal T1 postcontrast images of the thoracic  and lumbar spine obtained.    CONTRAST: 10.7ml gadavist.    FINDINGS:  Reading in conjunction with the thoracal lumbar spine dated 7/18/2024.    Diffuse multifocal thoracolumbar vertebral body enhancing foci  correlating with the areas of T1 hypointense foci seen on MRI from  7/18/2024.  Especially notable areas of enhancement include but not limited to:  T2, predominantly in the posterior vertebral body  T4, predominantly in the posterior vertebral body  T5, predominantly in the posterior vertebral body  T8, predominantly in the right inferior vertebral body  T11, diffusely throughout the vertebral body    L1, predominantly in the right inferior posterior vertebral body  L2, predominantly in the left anterolateral posterior vertebral body    S1, primarily in the right sacral ala  Right ischium, immediately to the right of the sacroiliac joints  Left ischium, involving a significant portion of the left iliac wing      Impression    IMPRESSION:   Multifocal enhancing lesions in the thoracolumbar, proximal sacrum,  and bilateral ischium correlating with the areas of abnormal signal on  the noncontrast thoracolumbar MRI 7/18/2024. These are concerning for  metastasis.    I have personally reviewed the examination and initial interpretation  and I agree with the findings.    PAYTON STORY MD         SYSTEM ID:  [id number]   MR Thoracic Spine w Contrast    Narrative    EXAM: MR THORACIC SPINE W CONTRAST, MR LUMBAR SPINE W CONTRAST   7/19/2024 11:14 PM     HISTORY: Hx of MGUS, lytic lesion on T12, concern for metastasis vs  primary lesion       COMPARISON: Thoracolumbar MRI 7/18/2024    TECHNIQUE: Axial and sagittal T1 postcontrast images of the thoracic  and lumbar spine obtained.    CONTRAST: 10.7ml gadavist.    FINDINGS:  Reading in conjunction with the thoracal lumbar spine dated 7/18/2024.    Diffuse multifocal thoracolumbar vertebral body enhancing  foci  correlating with the areas of T1 hypointense foci seen on MRI from  2024.  Especially notable areas of enhancement include but not limited to:  T2, predominantly in the posterior vertebral body  T4, predominantly in the posterior vertebral body  T5, predominantly in the posterior vertebral body  T8, predominantly in the right inferior vertebral body  T11, diffusely throughout the vertebral body    L1, predominantly in the right inferior posterior vertebral body  L2, predominantly in the left anterolateral posterior vertebral body    S1, primarily in the right sacral ala  Right ischium, immediately to the right of the sacroiliac joints  Left ischium, involving a significant portion of the left iliac wing      Impression    IMPRESSION:   Multifocal enhancing lesions in the thoracolumbar, proximal sacrum,  and bilateral ischium correlating with the areas of abnormal signal on  the noncontrast thoracolumbar MRI 2024. These are concerning for  metastasis.    I have personally reviewed the examination and initial interpretation  and I agree with the findings.    PAYTON STORY MD         SYSTEM ID:  [id number]   Echo Complete     Value    LVEF  70%    Narrative    [id number]  [id number]  [id number]  [id number]^PAYAL^ALEXX^EDYTA     [facility],[place]  Echocardiography Laboratory  [address] [place], [place] [address]     Name: VIJAY PERRIN  MRN: [id number]  : 1940  Study Date: 2024 12:49 PM  Age: 84 yrs  Gender: Female  Patient Location: [facility]  Reason For Study: Arrhythmia  Ordering Physician: ALEXX MOE  Performed By: Gabbie Diaz RDCS     BSA: 2.2 m2  Height: 67 in  Weight: 236 lb  HR: 74  BP: 101/59 mmHg  ______________________________________________________________________________  Procedure  Complete Portable Echo Adult. Contrast Optison. Technically difficult  study.Extremely poor acoustic windows. Optison (NDC #0420-3356-86) given  intravenously.  Patient was given 5 ml mixture of 3 ml Optison and 6 ml saline.  4 ml wasted.  ______________________________________________________________________________  Interpretation Summary  Technically difficult study.Extremely poor acoustic windows.  Global and regional left ventricular function is hyperkinetic with an EF >70%.  Global right ventricular function is normal.  The inferior vena cava is normal.  No pericardial effusion.  There is no prior study for direct comparison.  ______________________________________________________________________________  Left Ventricle  Global and regional left ventricular function is hyperkinetic with an EF >70%.  Left ventricular wall thickness cannot evaluate. Left ventricular cavity size  is small. Left ventricular diastolic function is not assessable. No regional  wall motion abnormalities are seen.     Right Ventricle  The right ventricle is normal size. Global right ventricular function is  normal.     Atria  The atria cannot be assessed.     Mitral Valve  The valve leaflets are not well visualized. Trace mitral insufficiency is  present.     Aortic Valve  The valve leaflets are not well visualized. On Doppler interrogation, there is  no significant stenosis or regurgitation.     Tricuspid Valve  The tricuspid valve cannot be assessed.     Pulmonic Valve  The pulmonic valve cannot be assessed.     Vessels  The aorta root cannot be assessed. The thoracic aorta cannot be assessed. The  inferior vena cava is normal.     Pericardium  No pericardial effusion is present.     Compared to Previous Study  There is no prior study for direct comparison.  ______________________________________________________________________________  MMode/2D Measurements & Calculations     TAPSE: 1.3 cm     Doppler Measurements & Calculations  MV E max horace: 53.6 cm/sec  MV A max horace: 86.5 cm/sec  MV E/A: 0.62  MV dec time: 0.30 sec  E/E' av.7  Lateral E/e': 7.9  Medial E/e': 11.6  RV S Horace: 12.6  cm/sec     ______________________________________________________________________________  Report approved by: Renate Reece 07/18/2024 03:25 PM             Discharge Medications   Current Discharge Medication List        CONTINUE these medications which have NOT CHANGED    Details   alendronate (FOSAMAX) 70 MG tablet Take 70 mg by mouth every 7 days On Sundays      atorvastatin (LIPITOR) 20 MG tablet Take 20 mg by mouth daily      azaTHIOprine (IMURAN) 50 MG tablet Take 75 mg by mouth daily      digoxin (LANOXIN) 125 MCG tablet Take 125 mcg by mouth daily      hydrochlorothiazide (HYDRODIURIL) 50 MG tablet Take 50 mg by mouth daily      polyethylene glycol (MIRALAX) 17 GM/Dose powder Take 1 Capful by mouth daily as needed for constipation      rivaroxaban ANTICOAGULANT (XARELTO) 20 MG TABS tablet Take 20 mg by mouth daily (with dinner)      spironolactone (ALDACTONE) 50 MG tablet Take 50 mg by mouth daily      Vitamin D3 (VITAMIN D-1000 MAX ST) 25 mcg (1000 units) tablet Take 1,000 Units by mouth daily           Allergies   No Known Allergies

## 2024-07-22 NOTE — PROGRESS NOTES
[facility]    Progress Note - [facility]       Date of Admission:  7/17/2024    Today's plan:  - page IR re scheduling bone biopsy  - discuss home care consult with pt  - continue holding xarelto and NPO in anticipation of bone biopsy today    Assessment & Plan   Cara Cool is an 84 year old female admitted on 7/17/24 for worsening non-traumatic back pain. Relevant PMHx of osteoporosis, RCC, b/l renal cysts, MGUS, saddle PE with acute cor pulmonale, atrial tachycardia, and paroxysmal afib. Found to have left T12 transverse process lytic lesion consistent with neoplasm, T11 fx, multifocal osteoblastic mets on thoracolumbar spine, ribs, and possibly left femur, tachycardia (resolved) and junctional rhythm on EKG. Currently, waiting to get T12 biopsy with IR. Heme & cards signed off. Neurosurgery & IR onboard.      # Numerous bony lesions concerning for malignancy with heavy metastatic burden  # MGUS, concern for multiple myeloma  # Mid-back pain  # Osteoporosis (DEXA Feb '24)  # Osteoarthritis  Presented with significant, persistent mid-back pain and found to have T12 lytic lesion and T11 fracture on ED imaging. History of MGUS (diagnosed Dec 2023) and RCC (diagnosed 2020/2021, tx'd with cryoablation without recurrence). Here, has had elevations in free light chains. On imaging (CT aortic survey, MRI thoracolumbar, bone scan) has had evidence of widespread bony metastasis but without clear evidence of primary source.    Consults  - Hematology (signed off), recs:   - Biopsy of bony lesion - pending with IR; Bone scan - completed on 7/19/24; SPEP, B2 microglobulin, follow-up MRI as below - completed   - Neurosurgery, recs:              - Biopsy of bony lesion              - MRI T & L spine w/ & w/o - completed on 7/19/24  - No surgical intervention needed at present, but recommend involvement of onc/rad-onc after biopsy is completed   - IR,  possibly 7/22 bone biopsy     Pain management  - Tylenol 650 mg q8h PRN (not to exceed 2g in one day due to autoimmune hepatitis)  - Lidocaine patch  - Oxycodone 5mg q4h PRN for moderate pain  - Oxycodone 7.5mg q4h PRN for severe pain  - Oxycodone 5mg 1 hour before physical therapy  - Hold PTA tramadol  - PTA Cholecalciferol 800 unit(s) daily  - Daily CBC, CMP     # Paroxysmal Afib  # Tachycardia  # Junctional rhythm  History of Afib, tachycardic to 120s-130s on admission, asymptomatic. Multiple EKGs showed junctional rhythm and ST and T-wave abnormalities not present on last EKG. Troponin slightly elevated on admit, plateau at 27. Echo without evidence of structural or functional change, EF >70%.  Cardiology consulted, signed off, recs:  - discontinue digoxin   - start metoprolol 2.5mg BID  - follow up 1-2 months (they will place referral)  - Rivaroxaban  20 mg daily     # Autoimmune hepatitis   # Cholelithiasis  # Bilateral multiple renal cysts  Hepatitis diagnosed in 2017. Stable on azathioprine. Stable LFTs with elevated alk phos, which may be due to bone pathology such as multiple myeloma. Bilateral multiple renal cysts, benign-appearing, seen on abdominal imaging since at least 2021.  - AzaTHIOprine 50 MG tablet daily  - Daily CMP     # Hyperparathyroidism s/p parathyroid adenoma removal  # Hypercalcemia, chronic  Per chart review, calcium elevated since at least 2010, after which she was found to have an elevated PTH which eventually led to discovery of an inferior L parathyroid adenoma which was resected in 2019. Slightly elevated on admit, otherwise WNL.     # History of saddle PE with cor pulmonale, 2021  - PTA Xarelto      Chronic/Stable  # Hypercholesterolemia: Atorvastatin 20 mg daily  # Hypertension: PTA Hydrochlorothiazide, PTA Spironolactone   # Constipation: Miralax, Senna scheduled daily  # Remote hx of TIA (2013) & resultant strabismus           Diet: Regular diet    DVT Prophylaxis: DOAC -  currently held in anticipation of bone biopsy  Lundberg Catheter: Not present  Fluids: None  Lines: PIV  Cardiac Monitoring: None  Code Status: Full Code         Disposition Plan      Expected Discharge Date: after bone biopsy and recs from teams involved.         The patient's care was discussed with the Attending Physician, Dr. Uriostegui, Chief Resident/Fellow, and Patient.    Clara Metcalf MD  [place]  [facility]  Securely message with [employer] (more info)  Text page via [employer] Paging/Directory   See signed in provider for up to date coverage information  ______________________________________________________________________    Interval History   Pertinent overnight events : RAJINDER     Pt reports pain is well-controlled. Feels a little bloated, denies abdominal pain. Suspects it could be because of her last meal. BM are regular. No other concerns for now.     Physical Exam   Constitutional: awake, alert, cooperative, no apparent distress, and appears stated age  Eyes: Strabismus with significant L sided exotropia, consistent with baseline  Respiratory: No increased work of breathing, good air exchange, clear to auscultation bilaterally, no crackles or wheezing  Cardiovascular: RRR, normal S1 and S2, no S3 or S4, and no murmur noted  Abdominal: non-tender      Data     I have personally reviewed the following data over the past 24 hrs:    5.7  \   13.2   / 409     139 102 27.8 (H) /  102 (H)   3.9 26 0.91 \     ALT: 6 AST: 19 AP: 163 (H) TBILI: 0.6   ALB: 3.7 TOT PROTEIN: 7.4 LIPASE: N/A       Imaging results reviewed over the past 24 hrs:   No results found for this or any previous visit (from the past 24 hour(s)).

## 2024-07-23 ENCOUNTER — APPOINTMENT (OUTPATIENT)
Dept: PHYSICAL THERAPY | Facility: CLINIC | Age: 84
DRG: 543 | End: 2024-07-23
Payer: COMMERCIAL

## 2024-07-23 LAB
ALBUMIN SERPL BCG-MCNC: 3.6 G/DL (ref 3.5–5.2)
ALP SERPL-CCNC: 158 U/L (ref 40–150)
ALT SERPL W P-5'-P-CCNC: 5 U/L (ref 0–50)
ANION GAP SERPL CALCULATED.3IONS-SCNC: 11 MMOL/L (ref 7–15)
AST SERPL W P-5'-P-CCNC: 18 U/L (ref 0–45)
BASOPHILS # BLD AUTO: 0.1 10E3/UL (ref 0–0.2)
BASOPHILS NFR BLD AUTO: 1 %
BILIRUB SERPL-MCNC: 0.5 MG/DL
BUN SERPL-MCNC: 28.5 MG/DL (ref 8–23)
CALCIUM SERPL-MCNC: 10 MG/DL (ref 8.8–10.4)
CHLORIDE SERPL-SCNC: 102 MMOL/L (ref 98–107)
CREAT SERPL-MCNC: 0.91 MG/DL (ref 0.51–0.95)
EGFRCR SERPLBLD CKD-EPI 2021: 63 ML/MIN/1.73M2
EOSINOPHIL # BLD AUTO: 0.2 10E3/UL (ref 0–0.7)
EOSINOPHIL NFR BLD AUTO: 4 %
ERYTHROCYTE [DISTWIDTH] IN BLOOD BY AUTOMATED COUNT: 14.8 % (ref 10–15)
GLUCOSE SERPL-MCNC: 97 MG/DL (ref 70–99)
HCO3 SERPL-SCNC: 27 MMOL/L (ref 22–29)
HCT VFR BLD AUTO: 40.4 % (ref 35–47)
HGB BLD-MCNC: 13.2 G/DL (ref 11.7–15.7)
IMM GRANULOCYTES # BLD: 0.1 10E3/UL
IMM GRANULOCYTES NFR BLD: 1 %
LYMPHOCYTES # BLD AUTO: 1.1 10E3/UL (ref 0.8–5.3)
LYMPHOCYTES NFR BLD AUTO: 20 %
MCH RBC QN AUTO: 29.7 PG (ref 26.5–33)
MCHC RBC AUTO-ENTMCNC: 32.7 G/DL (ref 31.5–36.5)
MCV RBC AUTO: 91 FL (ref 78–100)
MONOCYTES # BLD AUTO: 0.8 10E3/UL (ref 0–1.3)
MONOCYTES NFR BLD AUTO: 14 %
NEUTROPHILS # BLD AUTO: 3.3 10E3/UL (ref 1.6–8.3)
NEUTROPHILS NFR BLD AUTO: 60 %
NRBC # BLD AUTO: 0 10E3/UL
NRBC BLD AUTO-RTO: 1 /100
PLATELET # BLD AUTO: 428 10E3/UL (ref 150–450)
POTASSIUM SERPL-SCNC: 4.2 MMOL/L (ref 3.4–5.3)
PROT SERPL-MCNC: 7.4 G/DL (ref 6.4–8.3)
RBC # BLD AUTO: 4.44 10E6/UL (ref 3.8–5.2)
SODIUM SERPL-SCNC: 140 MMOL/L (ref 135–145)
WBC # BLD AUTO: 5.5 10E3/UL (ref 4–11)

## 2024-07-23 PROCEDURE — 82040 ASSAY OF SERUM ALBUMIN: CPT | Performed by: STUDENT IN AN ORGANIZED HEALTH CARE EDUCATION/TRAINING PROGRAM

## 2024-07-23 PROCEDURE — 97530 THERAPEUTIC ACTIVITIES: CPT | Mod: GP | Performed by: PHYSICAL THERAPIST

## 2024-07-23 PROCEDURE — 36415 COLL VENOUS BLD VENIPUNCTURE: CPT | Performed by: STUDENT IN AN ORGANIZED HEALTH CARE EDUCATION/TRAINING PROGRAM

## 2024-07-23 PROCEDURE — 250N000013 HC RX MED GY IP 250 OP 250 PS 637

## 2024-07-23 PROCEDURE — 85025 COMPLETE CBC W/AUTO DIFF WBC: CPT | Performed by: STUDENT IN AN ORGANIZED HEALTH CARE EDUCATION/TRAINING PROGRAM

## 2024-07-23 PROCEDURE — 250N000012 HC RX MED GY IP 250 OP 636 PS 637: Performed by: FAMILY MEDICINE

## 2024-07-23 PROCEDURE — 99232 SBSQ HOSP IP/OBS MODERATE 35: CPT | Mod: GC

## 2024-07-23 PROCEDURE — 250N000013 HC RX MED GY IP 250 OP 250 PS 637: Performed by: STUDENT IN AN ORGANIZED HEALTH CARE EDUCATION/TRAINING PROGRAM

## 2024-07-23 PROCEDURE — 120N000002 HC R&B MED SURG/OB UMMC

## 2024-07-23 PROCEDURE — 97116 GAIT TRAINING THERAPY: CPT | Mod: GP | Performed by: PHYSICAL THERAPIST

## 2024-07-23 RX ADMIN — HYDROCHLOROTHIAZIDE 50 MG: 50 TABLET ORAL at 08:57

## 2024-07-23 RX ADMIN — SENNOSIDES 8.6 MG: 8.6 TABLET, FILM COATED ORAL at 20:25

## 2024-07-23 RX ADMIN — SPIRONOLACTONE 50 MG: 25 TABLET ORAL at 08:58

## 2024-07-23 RX ADMIN — Medication 25 MCG: at 08:58

## 2024-07-23 RX ADMIN — Medication 7.5 MG: at 22:51

## 2024-07-23 RX ADMIN — POLYETHYLENE GLYCOL 3350 17 G: 17 POWDER, FOR SOLUTION ORAL at 17:56

## 2024-07-23 RX ADMIN — Medication 12.5 MG: at 08:57

## 2024-07-23 RX ADMIN — ACETAMINOPHEN 650 MG: 325 TABLET, FILM COATED ORAL at 11:24

## 2024-07-23 RX ADMIN — Medication 12.5 MG: at 20:25

## 2024-07-23 RX ADMIN — SENNOSIDES 8.6 MG: 8.6 TABLET, FILM COATED ORAL at 08:58

## 2024-07-23 RX ADMIN — ATORVASTATIN CALCIUM 20 MG: 20 TABLET, FILM COATED ORAL at 08:58

## 2024-07-23 RX ADMIN — AZATHIOPRINE 75 MG: 50 TABLET ORAL at 08:57

## 2024-07-23 ASSESSMENT — ACTIVITIES OF DAILY LIVING (ADL)
ADLS_ACUITY_SCORE: 31
DEPENDENT_IADLS:: INDEPENDENT
ADLS_ACUITY_SCORE: 31

## 2024-07-23 NOTE — CONSULTS
Care Management Initial Consult    General Information  Assessment completed with: Patient,    Type of CM/SW Visit: Initial Assessment    Primary Care Provider verified and updated as needed: Yes   Readmission within the last 30 days: no previous admission in last 30 days      Reason for Consult: discharge planning  Advance Care Planning: Advance Care Planning Reviewed: no concerns identified  Declined HCD form, stated she as not interested       Communication Assessment  Patient's communication style: spoken language (English or Bilingual)    Hearing Difficulty or Deaf: no   Wear Glasses or Blind: yes    Cognitive  Cognitive/Neuro/Behavioral: WDL                      Living Environment:   People in home: alone     Current living Arrangements: house      Able to return to prior arrangements: yes  Living Arrangement Comments: granddaughter    Family/Social Support:  Care provided by: self  Provides care for: no one  Marital Status: Single  Children, Other (specify) (grandchildren)          Description of Support System: Supportive, Involved    Support Assessment: Adequate family and caregiver support, Adequate social supports    Current Resources:   Patient receiving home care services: No     Community Resources: None  Equipment currently used at home: cane, straight, shower chair, grab bar, tub/shower, raised toilet seat, walker, standard  Supplies currently used at home: None    Employment/Financial:  Employment Status: retired        Financial Concerns: none   Referral to Financial Worker: No       Does the patient's insurance plan have a 3 day qualifying hospital stay waiver?  No    Lifestyle & Psychosocial Needs:  Social Determinants of Health     Food Insecurity: Not on file   Depression: Not at risk (12/22/2023)    Received from [facility]    PHQ-2     PHQ-2 Score: 0   Housing Stability: Not on file   Tobacco Use: Low Risk  (7/17/2024)    Patient History     Smoking Tobacco Use: Never     Smokeless  Tobacco Use: Never     Passive Exposure: Not on file   Financial Resource Strain: Not on file   Alcohol Use: Not on file   Transportation Needs: Not on file   Physical Activity: Not on file   Interpersonal Safety: Not on file   Stress: Not on file   Social Connections: Not on file   Health Literacy: Not on file       Functional Status:  Prior to admission patient needed assistance:   Dependent ADLs:: Independent  Dependent IADLs:: Independent       Mental Health Status:  Mental Health Status: No Current Concerns       Chemical Dependency Status:  Chemical Dependency Status: No Current Concerns             Values/Beliefs:  Spiritual, Cultural Beliefs, Mosque Practices, Values that affect care: no               Additional Information:  Per chart Review:  Cara Cool is a 84 year old female who presents to the ED with complaint of back pain. Past medical history notable for saddle PE with acute cor pulmonale, atrial tachycardia, paroxysmal atrial fibrillation, cholelithiasis, hypercholesterolemia, hypertension, hyperparathyroidism, obesity, osteoarthritis, TIA, tobacco use.  She presents with complaints of worsening, nontraumatic back pain.  She states that her pain began about 1 week ago and has been progressively worsening in severity prompting her arrival today for further evaluation.  She denies trauma, new heavy lifting or exacerbating activities that could have prompted or exacerbated her back pain 1 week ago.  She locates her pain to her mid central back with radiation bilaterally.  She denies history of back injuries or back pain at this severity.  She denies any radiation of the pain into her abdomen.  She denies any symptoms of sweating, nausea, vomiting, changes in her bowel movements, or urinary symptoms.  She denies on several occasions, symptoms of chest pain, palpitations, shortness of air, pain radiating into her shoulders, neck, jaw, or upper extremities.  She denies headache, vision changes or  "loss of vision.  She denies any numbness, tingling, or weakness into her upper or lower extremities.  She denies any overflow incontinence of bladder or bowel or numbness in her groin area.     This RNCC met with patient to introduce role/self of are Coordination. Patient states PTA she lived alone in a house and was totally independent with IADL's and ADL's and used [employer] to get around in community, grocery shop etc. She also has a granddaughter who assisted if needed. Patient states her granddaughter is going to move in with her at discharge and will stay with her as long as needed. Informed patient that therapies recommended home care at discharge and this has been set up. Patient stated \"I don't need home care\"  after explaining the benefits of home care patient agreed to participate but retained the option to cancel when they call if she feels she does not need it. Patient states her son in law will provide transport home when discharge imminent.    Carly SUAREZ RN [facility]  5MS [phone]  Nurse Coordinator  Securely message with [facility] RNCC       "

## 2024-07-23 NOTE — CARE PLAN
"Shift: 0700 - 1530    /83 (BP Location: Right arm)   Pulse 64   Temp 98.4  F (36.9  C) (Oral)   Resp 17   Ht 1.721 m (5' 7.76\")   Wt 107.1 kg (236 lb 1.6 oz)   SpO2 91%   BMI 36.16 kg/m      Patient is, AOX4, No acute change or events.  No fever or chills. Pt pain managed with PRN tylenol  with relieve. Pt is NPO. Pt is able to make needs know, call light is in reach, continue with POC.    "

## 2024-07-23 NOTE — CONSULTS
"SPIRITUAL HEALTH SERVICES - Consult Note [facility] [facility]     Referral Source/Reason for Visit:   initiated length of stay        Summary and Recommendations -  *   Cara expressed frustration over \"all this back and forth\" regarding fasting for biopsy she tells me still hasn't happened she says, \"I'm having a rough day\"  Cara described good social supports from her family, but says \"I've always been the rock for the family and they don't need more.\" And got teary discussing her daughters heart issues and the loss several years ago of her nephew.  Cara's yong is strong and she found prayer and devotional reading supportive, which I provided.  Cara requested a  visit \"Any time you're on the floor just stop in\"      Plan:  Spiritual health will stop in as we are able, at least weekly.  Spiritual Health Services will remain available on request.  Please place a standard consult order in Epic.      Leann Morales   Chaplain Resident  Pager [phone]    [facility] available 24/7 for emergent requests/referrals, either by paging the on-call  or by entering an ASAP/STAT consult in [employer], which will also page the on-call .     Assessment     Saw pt Cara Cool     Patient/Family Understanding of Illness and Goals of Care -  Cara talks about having 2 spots on her spine and that no one knows why they're there.  She reports that there is a plan to biopsy and she's had to fast for it twice, but it still hasn't happened.     Distress and Loss -  Cara is frustrated about going without food to no end.  She also teared up talking about \"I'm the rock for the whole family\"     Strengths, Coping, and Resources -  Cara has good social support from her family.  She has a niece staying at her home preparing for Cara to return.  She has been talking to her sister on the phone and receiving visits from family members.     Meaning, Beliefs, and Spirituality -  Cara's yong is important, although she " "denies belonging to a yong community.  She shares her father was a .  She credits God with her strength and prays for guidance.  I shared Psalm 46 and we prayed at her request.  Cara appreciate spiritual support and says,  \"Any time you're on the floor just stop in\"         * [facility] remains available 24/7 for emergent requests/referrals, either by having the switchboard page the on-call  or by entering an ASAP/STAT consult in Epic (this will also page the on-call ). Routine Epic consults receive an initial response within 24 hours.    "

## 2024-07-23 NOTE — PROGRESS NOTES
[facility]    Progress Note - [facility] Medicine Service       Date of Admission:  7/17/2024    Today's plan:  - page IR about scheduling bone biopsy  - continue holding xarelto, start PCDs     Assessment & Plan  Cara Cool is an 84 year old female admitted on 7/17/24 for worsening non-traumatic back pain. Relevant PMHx of osteoporosis, RCC, b/l renal cysts, MGUS, saddle PE with acute cor pulmonale, atrial tachycardia, and paroxysmal afib. Found to have left T12 transverse process lytic lesion consistent with neoplasm, T11 fx, multifocal osteoblastic mets on thoracolumbar spine, ribs, and possibly left femur, tachycardia (resolved) and junctional rhythm on EKG. Currently, waiting to get T12 biopsy with IR. Heme & cards signed off. Neurosurgery & IR onboard.      # Numerous bony lesions concerning for malignancy with heavy metastatic burden  # MGUS, low concern for multiple myeloma  # Mid-back pain  # Osteoporosis (DEXA Feb '24)  # Osteoarthritis  Presented with significant, persistent mid-back pain and found to have T12 lytic lesion and T11 fracture on ED imaging. History of MGUS (diagnosed Dec 2023) and RCC (diagnosed 2020/2021, tx'd with cryoablation without recurrence). Here, has had elevations in free light chains. On imaging (CT aortic survey, MRI thoracolumbar, bone scan) has had evidence of widespread bony metastasis but without clear evidence of primary source.     Consults  - Hematology (signed off), recs:   - Biopsy of bony lesion - pending with IR; Bone scan - completed on 7/19/24; SPEP, B2 microglobulin, follow-up MRI as below - completed   - Neurosurgery, recs:              - Biopsy of bony lesion              - MRI T & L spine w/ & w/o - completed on 7/19/24  - No surgical intervention needed at present, but recommend involvement of onc/rad-onc after biopsy is completed  - IR, possibly 7/24 bone biopsy     Pain management  - Tylenol 650 mg q8h  PRN (not to exceed 2g in one day due to autoimmune hepatitis)  - Lidocaine patch  - Oxycodone 5mg q4h PRN for moderate pain  - Oxycodone 7.5mg q4h PRN for severe pain  - Oxycodone 5mg 1 hour before physical therapy  - Hold PTA tramadol  - PTA Cholecalciferol 800 unit(s) daily  - Daily CBC, CMP     # Paroxysmal Afib  # Tachycardia  # Junctional rhythm  History of Afib, tachycardic to 120s-130s on admission, asymptomatic. Multiple EKGs showed junctional rhythm and ST and T-wave abnormalities not present on last EKG. Troponin slightly elevated on admit, plateau at 27. Echo without evidence of structural or functional change, EF >70%.  Cardiology consulted, signed off, recs:  - discontinue digoxin   - start metoprolol 2.5mg BID  - follow up 1-2 months (they will place referral)  - Rivaroxaban  20 mg daily     # Autoimmune hepatitis   # Cholelithiasis  # Bilateral multiple renal cysts  Hepatitis diagnosed in 2017. Stable on azathioprine. Stable LFTs with elevated alk phos, which may be due to bone pathology such as multiple myeloma. Bilateral multiple renal cysts, benign-appearing, seen on abdominal imaging since at least 2021.  - AzaTHIOprine 50 MG tablet daily  - Daily CMP     # Hyperparathyroidism s/p parathyroid adenoma removal  # Hypercalcemia, chronic  Per chart review, calcium elevated since at least 2010, after which she was found to have an elevated PTH which eventually led to discovery of an inferior L parathyroid adenoma which was resected in 2019. Slightly elevated on admit, otherwise WNL.     # History of saddle PE with cor pulmonale, 2021  - PTA Xarelto      Chronic/Stable  # Hypercholesterolemia: Atorvastatin 20 mg daily  # Hypertension: PTA Hydrochlorothiazide, PTA Spironolactone   # Constipation: Miralax, Senna scheduled daily  # Remote hx of TIA (2013) & resultant strabismus     Diet: NPO per Anesthesia Guidelines for Procedure/Surgery Except for: Meds    DVT Prophylaxis: Pneumatic Compression  Devices  Lundberg Catheter: Not present  Fluids: None  Lines: PIV   Cardiac Monitoring: None  Code Status: Full Code      Disposition Plan      Expected Discharge Date: 07/24/2024      Destination: home with family;home with help/services  Discharge Comments: Will be ready for d/c pending bone biopsy results.        The patient's care was discussed with the Attending Physician, Dr. Uriostegui, Chief Resident/Fellow, and Patient.    lCara Metcalf MD  [place]  [facility]  Securely message with [facility] (more info)  Text page via [facility] Paging/Directory   See signed in provider for up to date coverage information  ______________________________________________________________________    Interval History   Pertinent overnight events: NAEO    Pt reports pain is well-controlled. Hasn't had a BM in about 2 days, but passing gas. No other concerns for now.     Physical Exam   Vital Signs: Temp: 98.3  F (36.8  C) Temp src: Oral BP: 121/81 Pulse: 64   Resp: 16 SpO2: 97 % O2 Device: None (Room air)    Weight: 236 lbs 1.6 oz    Constitutional: awake, alert, cooperative, no apparent distress, and appears stated age  Eyes: Strabismus with significant L sided exotropia, consistent with baseline  Respiratory: No increased work of breathing, good air exchange, clear to auscultation bilaterally, no crackles or wheezing  Cardiovascular: RRR, normal S1 and S2, no S3 or S4, and no murmur noted  Abdominal: mild tenderness over LLQ, no rebound tenderness      Data     I have personally reviewed the following data over the past 24 hrs:    5.5  \   13.2   / 428     140 102 28.5 (H) /  97   4.2 27 0.91 \     ALT: 5 AST: 18 AP: 158 (H) TBILI: 0.5   ALB: 3.6 TOT PROTEIN: 7.4 LIPASE: N/A       Imaging results reviewed over the past 24 hrs:   No results found for this or any previous visit (from the past 24 hour(s)).

## 2024-07-23 NOTE — PLAN OF CARE
"Goal Outcome Evaluation:      Plan of Care Reviewed With: patient    Overall Patient Progress: no change    Outcome Evaluation: no change overnight. pt NPO at midnight and CHG done by NST this morning. pt denied pain.      Problem: Adult Inpatient Plan of Care  Goal: Plan of Care Review  Description: The Plan of Care Review/Shift note should be completed every shift.  The Outcome Evaluation is a brief statement about your assessment that the patient is improving, declining, or no change.  This information will be displayed automatically on your shift  note.  Outcome: Progressing  Flowsheets (Taken 7/23/2024 0669)  Outcome Evaluation: no change overnight. pt NPO at midnight and CHG done by NST this morning. pt denied pain.  Plan of Care Reviewed With: patient  Overall Patient Progress: no change  Goal: Patient-Specific Goal (Individualized)  Description: You can add care plan individualizations to a care plan. Examples of Individualization might be:  \"Parent requests to be called daily at 9am for status\", \"I have a hard time hearing out of my right ear\", or \"Do not touch me to wake me up as it startles  me\".  Outcome: Progressing  Goal: Absence of Hospital-Acquired Illness or Injury  Outcome: Progressing  Intervention: Identify and Manage Fall Risk  Recent Flowsheet Documentation  Taken 7/22/2024 2332 by Lizet Dewey RN  Safety Promotion/Fall Prevention:   activity supervised   assistive device/personal items within reach   room organization consistent   nonskid shoes/slippers when out of bed   mobility aid in reach   patient and family education   safety round/check completed  Intervention: Prevent Skin Injury  Recent Flowsheet Documentation  Taken 7/22/2024 2332 by Lizet Dewey RN  Body Position:   position changed independently   heels elevated  Goal: Optimal Comfort and Wellbeing  Outcome: Progressing  Goal: Readiness for Transition of Care  Outcome: Progressing     Problem: Risk for Delirium  Goal: " Optimal Coping  Outcome: Progressing  Goal: Improved Behavioral Control  Outcome: Progressing  Intervention: Minimize Safety Risk  Recent Flowsheet Documentation  Taken 7/22/2024 2332 by Lziet Dewey RN  Enhanced Safety Measures: pain management  Goal: Improved Attention and Thought Clarity  Outcome: Progressing  Goal: Improved Sleep  Outcome: Progressing     Problem: Pain Acute  Goal: Optimal Pain Control and Function  Outcome: Progressing  Intervention: Prevent or Manage Pain  Recent Flowsheet Documentation  Taken 7/22/2024 2332 by Lizet Dewey RN  Medication Review/Management: medications reviewed

## 2024-07-23 NOTE — PLAN OF CARE
Goal Outcome Evaluation:      Plan of Care Reviewed With: patient          Outcome Evaluation: Plan is for patient to discharge home with home care

## 2024-07-24 ENCOUNTER — APPOINTMENT (OUTPATIENT)
Dept: PHYSICAL THERAPY | Facility: CLINIC | Age: 84
DRG: 543 | End: 2024-07-24
Payer: COMMERCIAL

## 2024-07-24 ENCOUNTER — APPOINTMENT (OUTPATIENT)
Dept: OCCUPATIONAL THERAPY | Facility: CLINIC | Age: 84
DRG: 543 | End: 2024-07-24
Payer: COMMERCIAL

## 2024-07-24 LAB
ALBUMIN SERPL BCG-MCNC: 3.7 G/DL (ref 3.5–5.2)
ALP SERPL-CCNC: 163 U/L (ref 40–150)
ALT SERPL W P-5'-P-CCNC: <5 U/L (ref 0–50)
ANION GAP SERPL CALCULATED.3IONS-SCNC: 11 MMOL/L (ref 7–15)
AST SERPL W P-5'-P-CCNC: 20 U/L (ref 0–45)
BASOPHILS # BLD AUTO: 0.1 10E3/UL (ref 0–0.2)
BASOPHILS NFR BLD AUTO: 1 %
BILIRUB SERPL-MCNC: 0.6 MG/DL
BUN SERPL-MCNC: 27.5 MG/DL (ref 8–23)
CALCIUM SERPL-MCNC: 10.5 MG/DL (ref 8.8–10.4)
CHLORIDE SERPL-SCNC: 103 MMOL/L (ref 98–107)
CREAT SERPL-MCNC: 0.92 MG/DL (ref 0.51–0.95)
EGFRCR SERPLBLD CKD-EPI 2021: 62 ML/MIN/1.73M2
EOSINOPHIL # BLD AUTO: 0.3 10E3/UL (ref 0–0.7)
EOSINOPHIL NFR BLD AUTO: 5 %
ERYTHROCYTE [DISTWIDTH] IN BLOOD BY AUTOMATED COUNT: 14.9 % (ref 10–15)
GLUCOSE SERPL-MCNC: 94 MG/DL (ref 70–99)
HCO3 SERPL-SCNC: 26 MMOL/L (ref 22–29)
HCT VFR BLD AUTO: 42.4 % (ref 35–47)
HGB BLD-MCNC: 13.9 G/DL (ref 11.7–15.7)
IMM GRANULOCYTES # BLD: 0.1 10E3/UL
IMM GRANULOCYTES NFR BLD: 1 %
LYMPHOCYTES # BLD AUTO: 1.1 10E3/UL (ref 0.8–5.3)
LYMPHOCYTES NFR BLD AUTO: 19 %
MCH RBC QN AUTO: 29.9 PG (ref 26.5–33)
MCHC RBC AUTO-ENTMCNC: 32.8 G/DL (ref 31.5–36.5)
MCV RBC AUTO: 91 FL (ref 78–100)
MONOCYTES # BLD AUTO: 0.8 10E3/UL (ref 0–1.3)
MONOCYTES NFR BLD AUTO: 14 %
NEUTROPHILS # BLD AUTO: 3.4 10E3/UL (ref 1.6–8.3)
NEUTROPHILS NFR BLD AUTO: 60 %
NRBC # BLD AUTO: 0 10E3/UL
NRBC BLD AUTO-RTO: 0 /100
PLATELET # BLD AUTO: 449 10E3/UL (ref 150–450)
POTASSIUM SERPL-SCNC: 4.3 MMOL/L (ref 3.4–5.3)
PROT SERPL-MCNC: 7.5 G/DL (ref 6.4–8.3)
RBC # BLD AUTO: 4.65 10E6/UL (ref 3.8–5.2)
SODIUM SERPL-SCNC: 140 MMOL/L (ref 135–145)
WBC # BLD AUTO: 5.7 10E3/UL (ref 4–11)

## 2024-07-24 PROCEDURE — 85025 COMPLETE CBC W/AUTO DIFF WBC: CPT | Performed by: STUDENT IN AN ORGANIZED HEALTH CARE EDUCATION/TRAINING PROGRAM

## 2024-07-24 PROCEDURE — 99232 SBSQ HOSP IP/OBS MODERATE 35: CPT | Mod: GC

## 2024-07-24 PROCEDURE — 80053 COMPREHEN METABOLIC PANEL: CPT | Performed by: STUDENT IN AN ORGANIZED HEALTH CARE EDUCATION/TRAINING PROGRAM

## 2024-07-24 PROCEDURE — 36415 COLL VENOUS BLD VENIPUNCTURE: CPT | Performed by: STUDENT IN AN ORGANIZED HEALTH CARE EDUCATION/TRAINING PROGRAM

## 2024-07-24 PROCEDURE — 97530 THERAPEUTIC ACTIVITIES: CPT | Mod: GP

## 2024-07-24 PROCEDURE — 97535 SELF CARE MNGMENT TRAINING: CPT | Mod: GO

## 2024-07-24 PROCEDURE — 120N000002 HC R&B MED SURG/OB UMMC

## 2024-07-24 PROCEDURE — 250N000013 HC RX MED GY IP 250 OP 250 PS 637

## 2024-07-24 PROCEDURE — 250N000013 HC RX MED GY IP 250 OP 250 PS 637: Performed by: STUDENT IN AN ORGANIZED HEALTH CARE EDUCATION/TRAINING PROGRAM

## 2024-07-24 PROCEDURE — 97116 GAIT TRAINING THERAPY: CPT | Mod: GP

## 2024-07-24 PROCEDURE — 250N000012 HC RX MED GY IP 250 OP 636 PS 637: Performed by: FAMILY MEDICINE

## 2024-07-24 RX ORDER — NITROFURANTOIN 25; 75 MG/1; MG/1
100 CAPSULE ORAL 2 TIMES DAILY
Status: DISCONTINUED | OUTPATIENT
Start: 2024-07-24 | End: 2024-07-25 | Stop reason: HOSPADM

## 2024-07-24 RX ADMIN — SENNOSIDES 8.6 MG: 8.6 TABLET, FILM COATED ORAL at 07:43

## 2024-07-24 RX ADMIN — SENNOSIDES 8.6 MG: 8.6 TABLET, FILM COATED ORAL at 20:26

## 2024-07-24 RX ADMIN — Medication 12.5 MG: at 20:26

## 2024-07-24 RX ADMIN — NITROFURANTOIN MONOHYDRATE/MACROCRYSTALS 100 MG: 75; 25 CAPSULE ORAL at 10:21

## 2024-07-24 RX ADMIN — HYDROCHLOROTHIAZIDE 50 MG: 50 TABLET ORAL at 07:42

## 2024-07-24 RX ADMIN — SPIRONOLACTONE 50 MG: 25 TABLET ORAL at 07:42

## 2024-07-24 RX ADMIN — Medication 7.5 MG: at 22:49

## 2024-07-24 RX ADMIN — Medication 12.5 MG: at 07:42

## 2024-07-24 RX ADMIN — AZATHIOPRINE 75 MG: 50 TABLET ORAL at 07:42

## 2024-07-24 RX ADMIN — ATORVASTATIN CALCIUM 20 MG: 20 TABLET, FILM COATED ORAL at 07:42

## 2024-07-24 RX ADMIN — Medication 7.5 MG: at 08:06

## 2024-07-24 RX ADMIN — NITROFURANTOIN MONOHYDRATE/MACROCRYSTALS 100 MG: 75; 25 CAPSULE ORAL at 20:26

## 2024-07-24 RX ADMIN — Medication 25 MCG: at 07:42

## 2024-07-24 ASSESSMENT — ACTIVITIES OF DAILY LIVING (ADL)
ADLS_ACUITY_SCORE: 32
ADLS_ACUITY_SCORE: 31
ADLS_ACUITY_SCORE: 32
ADLS_ACUITY_SCORE: 31
ADLS_ACUITY_SCORE: 32

## 2024-07-24 NOTE — PLAN OF CARE
Occupational Therapy Discharge Summary    Reason for therapy discharge:    All goals and outcomes met, no further needs identified.    Progress towards therapy goal(s). See goals on Care Plan in [facility] electronic health record for goal details.  Goals met    Therapy recommendation(s):    Recommend A for heavy IADL as needed,  OT for home safety eval and to continue to work on ADL within spinal precautions in pt home setting.

## 2024-07-24 NOTE — PROGRESS NOTES
[facility]    Progress Note - [facility]       Date of Admission:  7/17/2024    Today's plan:  - Page IR about scheduling bone biopsy  - Continue holding xarelto, on PCDs  - Start Nitrofurantoin for UTI      Assessment & Plan  Cara Cool is an 84 year old female admitted on 7/17/24 for worsening non-traumatic back pain. Relevant PMHx of osteoporosis, RCC, b/l renal cysts, MGUS, saddle PE with acute cor pulmonale, atrial tachycardia, and paroxysmal afib. Found to have left T12 transverse process lytic lesion consistent with neoplasm, T11 fx, multifocal osteoblastic mets on thoracolumbar spine, ribs, and possibly left femur, tachycardia (resolved) and junctional rhythm on EKG. Currently, waiting to get T12 biopsy with IR. Heme & cards signed off. Neurosurgery & IR onboard.      # Numerous bony lesions concerning for malignancy with heavy metastatic burden  # MGUS, low concern for multiple myeloma  # Mid-back pain  # Osteoporosis (DEXA Feb '24)  # Osteoarthritis  Presented with significant, persistent mid-back pain and found to have T12 lytic lesion and T11 fracture on ED imaging. History of MGUS (diagnosed Dec 2023) and RCC (diagnosed 2020/2021, tx'd with cryoablation without recurrence). Here, has had elevations in free light chains. On imaging (CT aortic survey, MRI thoracolumbar, bone scan) has had evidence of widespread bony metastasis but without clear evidence of primary source.     Consults  - Hematology (signed off), recs:   - Biopsy of bony lesion - pending with IR; Bone scan - completed on 7/19/24; SPEP, B2 microglobulin, follow-up MRI as below - completed   - Neurosurgery, recs:              - Biopsy of bony lesion              - MRI T & L spine w/ & w/o - completed on 7/19/24  - No surgical intervention needed at present, but recommend involvement of onc/rad-onc after biopsy is completed  - IR, possibly 7/25 bone biopsy     Pain  management  - Tylenol 650 mg q8h PRN (not to exceed 2g in one day due to autoimmune hepatitis)  - Lidocaine patch  - Oxycodone 5mg q4h PRN for moderate pain  - Oxycodone 7.5mg q4h PRN for severe pain  - Oxycodone 5mg 1 hour before physical therapy  - Hold PTA tramadol  - PTA Cholecalciferol 800 unit(s) daily  - Daily CBC, CMP      #UTI   #Urine culture positive for Escherichia coli   UA positive for Nitrite and negative for LE. Urine culture growing >100,000 E.Coli.  Asymptomatic.   - Nitrofurantoin 100 mg BID for 5 days (7/24- P)     # Paroxysmal Afib  # Tachycardia, resolved  # Junctional rhythm  History of Afib, tachycardic to 120s-130s on admission, asymptomatic. Multiple EKGs showed junctional rhythm and ST and T-wave abnormalities not present on last EKG. Troponin slightly elevated on admit, plateau at 27. Echo without evidence of structural or functional change, EF >70%.  Cardiology consulted, signed off, recs:  - Discontinue digoxin   - Metoprolol 2.5mg BID  - Follow up 1-2 months (they will place referral)  - Rivaroxaban  20 mg daily     # Autoimmune hepatitis   # Cholelithiasis  # Bilateral multiple renal cysts  Hepatitis diagnosed in 2017. Stable on azathioprine. Stable LFTs with elevated alk phos, which may be due to bone pathology such as multiple myeloma. Bilateral multiple renal cysts, benign-appearing, seen on abdominal imaging since at least 2021.  - AzaTHIOprine 50 MG tablet daily  - Daily CMP     # Hyperparathyroidism s/p parathyroid adenoma removal  # Hypercalcemia, chronic  Per chart review, calcium elevated since at least 2010, after which she was found to have an elevated PTH which eventually led to discovery of an inferior L parathyroid adenoma which was resected in 2019. Slightly elevated on admit, otherwise WNL.     # History of saddle PE with cor pulmonale, 2021  - PTA Xarelto (Held)     Chronic/Stable  # Hypercholesterolemia: Atorvastatin 20 mg daily  # Hypertension: PTA  Hydrochlorothiazide, PTA Spironolactone   # Constipation: Miralax, Senna scheduled daily  # Remote hx of TIA (2013) & resultant strabismus     Diet: Regular Diet Adult    DVT Prophylaxis: Pneumatic Compression Devices  Lundberg Catheter: Not present  Fluids: None  Lines: PIV   Cardiac Monitoring: None  Code Status: Full Code      Disposition Plan      Expected Discharge Date: 07/24/2024      Destination: home with family;home with help/services  Discharge Comments: Will be ready for d/c pending bone biopsy results.        The patient's care was discussed with the Attending Physician, Dr. Uriostegui, Chief Resident/Fellow, and Patient.    Fran Aguilar MD  [facility]  [facility]  Securely message with [employer] (more info)  Text page via [employer] Paging/Directory   See signed in provider for up to date coverage information  ______________________________________________________________________    Interval History   Pertinent overnight events: NAEO    Pt reports pain is well-controlled. Had a large BM this morning. Wondering when the biopsy will be schedule. Otherwise, no other concerns at this time.     Physical Exam   Vital Signs: Temp: 97.5  F (36.4  C) Temp src: Oral BP: 138/75 Pulse: 60   Resp: 18 SpO2: 100 % O2 Device: None (Room air)    Weight: 236 lbs 1.6 oz    Constitutional: awake, alert, cooperative, no apparent distress, and appears stated age  Eyes: Strabismus with significant L sided exotropia, consistent with baseline  Respiratory: No increased work of breathing, good air exchange, clear to auscultation bilaterally, no crackles or wheezing  Cardiovascular: RRR, normal S1 and S2, no S3 or S4, and no murmur noted  Abdominal: Normal bowel sounds. No tenderness to palpation throughout all quadrants.       Data     I have personally reviewed the following data over the past 24 hrs:    5.7  \   13.9   / 449     140 103 27.5 (H) /  94   4.3 26 0.92 \      ALT: <5 AST: 20 AP: 163 (H) TBILI: 0.6   ALB: 3.7 TOT PROTEIN: 7.5 LIPASE: N/A       Imaging results reviewed over the past 24 hrs:   No results found for this or any previous visit (from the past 24 hour(s)).

## 2024-07-24 NOTE — PLAN OF CARE
Goal Outcome Evaluation:      Plan of Care Reviewed With: patient    Overall Patient Progress: improvingOverall Patient Progress: improving    Outcome Evaluation: Pt A&O X4, C/o minimal pain on lower back. VSS. Up with SBA with walker and gait belt. pt was on NPO for biopsy today but procedure cancled. pt is on regular diet now. pt able to make needs known. call light in reach. will continue to monitor.

## 2024-07-24 NOTE — PLAN OF CARE
Goal Outcome Evaluation:      Plan of Care Reviewed With: patient    Overall Patient Progress: improving    VSS on RA. Pt  is alert and oriented X4, uses call light appropriately to make needs known to staff. Up with SBA with walker and gait belt. L lower forearm SL. Regular diet. No acute events noted this shift. Call light within reach, plan of care ongoing.

## 2024-07-24 NOTE — PROGRESS NOTES
"   VS: /75 (BP Location: Right arm)   Pulse 60   Temp 97.5  F (36.4  C) (Oral)   Resp 18   Ht 1.721 m (5' 7.76\")   Wt 107.1 kg (236 lb 1.6 oz)   SpO2 100%   BMI 36.16 kg/m     O2: Stable on RA, no SOB   Output: Voiding adequately via bedside commode    Last BM: 7/24   Activity: SBA walker and gait belt   Skin: Visible skin intact   Pain: Managed with PRN pain medication - see MAR              CMS: A&O x4. No N/V or numbness/tingling   Dressing: N/A   Diet: Regular   LDA: L PIV SL    Equipment: IV Pole, personal belongings   Plan: Call light in reach, continue POC   Additional Info: NPO at 0001 7/25 for bone biopsy          "

## 2024-07-24 NOTE — PROGRESS NOTES
[facility], [place]   07/24/2024  Brief Neurosurgery Progress Note:    Assessment:  84 year old female with history of RCC and MGUS (currently being worked up for multiple myeloma), presenting with back pain and found to have a lytic lesion on T11 associated with compression fracture with minimal height loss and a second lesion at L4. She is currently full strength at all 4 extremities, does not have any sensory deficits, no hyperreflexia, Mendes's or clonus. MRI T and L spine showing diffuse tumor involvement of the spine. Pending biopsy with IR to determine treatment plan.     Recommendations:  -- No neurosurgical intervention indicated at this time   -- No activity restrictions and bracing needed   -- Biopsy of bone lesion by IR  - Consult Oncology and Radiation Oncology after Bx completed  -- Neurosurgery will sign off today.  Please call with questions or concerns or if there is any  significant change in neurological exam or progression of symptoms  -----------------------------------    VALENTINA Atkinson, CNP  Neurosurgery  Pager [phone]

## 2024-07-24 NOTE — PLAN OF CARE
Goal Outcome Evaluation:  Alert and oriented X 4. Able to make needs known. Call light in reach. VSS. Afebrile. Maintaining O2 sats in upper 90s on room air. Denies headache, dizziness, lightheadedness, chest pain or SOB. Back pain managed with PRN oxycodone. Up to BSC with SBA. Continent of bowel and bladder, Had a large loose BM this shift. Appears to be sleeping during rounds.  No new issues or changes overnight. Continue with plan of care.

## 2024-07-25 VITALS
HEART RATE: 67 BPM | TEMPERATURE: 97.5 F | BODY MASS INDEX: 35.78 KG/M2 | OXYGEN SATURATION: 100 % | RESPIRATION RATE: 16 BRPM | HEIGHT: 68 IN | DIASTOLIC BLOOD PRESSURE: 67 MMHG | WEIGHT: 236.1 LBS | SYSTOLIC BLOOD PRESSURE: 104 MMHG

## 2024-07-25 LAB
ALBUMIN SERPL BCG-MCNC: 3.7 G/DL (ref 3.5–5.2)
ALP SERPL-CCNC: 171 U/L (ref 40–150)
ALT SERPL W P-5'-P-CCNC: 5 U/L (ref 0–50)
ANION GAP SERPL CALCULATED.3IONS-SCNC: 11 MMOL/L (ref 7–15)
AST SERPL W P-5'-P-CCNC: 20 U/L (ref 0–45)
BACTERIA UR CULT: ABNORMAL
BASOPHILS # BLD AUTO: 0.1 10E3/UL (ref 0–0.2)
BASOPHILS NFR BLD AUTO: 1 %
BILIRUB SERPL-MCNC: 0.6 MG/DL
BUN SERPL-MCNC: 25.9 MG/DL (ref 8–23)
CALCIUM SERPL-MCNC: 10.4 MG/DL (ref 8.8–10.4)
CHLORIDE SERPL-SCNC: 101 MMOL/L (ref 98–107)
CREAT SERPL-MCNC: 0.92 MG/DL (ref 0.51–0.95)
EGFRCR SERPLBLD CKD-EPI 2021: 62 ML/MIN/1.73M2
EOSINOPHIL # BLD AUTO: 0.2 10E3/UL (ref 0–0.7)
EOSINOPHIL NFR BLD AUTO: 4 %
ERYTHROCYTE [DISTWIDTH] IN BLOOD BY AUTOMATED COUNT: 14.9 % (ref 10–15)
GLUCOSE SERPL-MCNC: 97 MG/DL (ref 70–99)
HCO3 SERPL-SCNC: 27 MMOL/L (ref 22–29)
HCT VFR BLD AUTO: 43.4 % (ref 35–47)
HGB BLD-MCNC: 14.1 G/DL (ref 11.7–15.7)
IMM GRANULOCYTES # BLD: 0.1 10E3/UL
IMM GRANULOCYTES NFR BLD: 1 %
INR PPP: 1.11 (ref 0.85–1.15)
LYMPHOCYTES # BLD AUTO: 1.2 10E3/UL (ref 0.8–5.3)
LYMPHOCYTES NFR BLD AUTO: 20 %
MCH RBC QN AUTO: 29.5 PG (ref 26.5–33)
MCHC RBC AUTO-ENTMCNC: 32.5 G/DL (ref 31.5–36.5)
MCV RBC AUTO: 91 FL (ref 78–100)
MONOCYTES # BLD AUTO: 0.7 10E3/UL (ref 0–1.3)
MONOCYTES NFR BLD AUTO: 12 %
NEUTROPHILS # BLD AUTO: 3.9 10E3/UL (ref 1.6–8.3)
NEUTROPHILS NFR BLD AUTO: 62 %
NRBC # BLD AUTO: 0 10E3/UL
NRBC BLD AUTO-RTO: 1 /100
PLATELET # BLD AUTO: 454 10E3/UL (ref 150–450)
POTASSIUM SERPL-SCNC: 4.2 MMOL/L (ref 3.4–5.3)
PROT SERPL-MCNC: 7.7 G/DL (ref 6.4–8.3)
RBC # BLD AUTO: 4.78 10E6/UL (ref 3.8–5.2)
SODIUM SERPL-SCNC: 139 MMOL/L (ref 135–145)
WBC # BLD AUTO: 6.2 10E3/UL (ref 4–11)

## 2024-07-25 PROCEDURE — 250N000011 HC RX IP 250 OP 636: Performed by: FAMILY MEDICINE

## 2024-07-25 PROCEDURE — 250N000013 HC RX MED GY IP 250 OP 250 PS 637

## 2024-07-25 PROCEDURE — 36415 COLL VENOUS BLD VENIPUNCTURE: CPT | Performed by: STUDENT IN AN ORGANIZED HEALTH CARE EDUCATION/TRAINING PROGRAM

## 2024-07-25 PROCEDURE — 36415 COLL VENOUS BLD VENIPUNCTURE: CPT

## 2024-07-25 PROCEDURE — 99239 HOSP IP/OBS DSCHRG MGMT >30: CPT | Mod: GC

## 2024-07-25 PROCEDURE — 250N000013 HC RX MED GY IP 250 OP 250 PS 637: Performed by: STUDENT IN AN ORGANIZED HEALTH CARE EDUCATION/TRAINING PROGRAM

## 2024-07-25 PROCEDURE — 80053 COMPREHEN METABOLIC PANEL: CPT | Performed by: STUDENT IN AN ORGANIZED HEALTH CARE EDUCATION/TRAINING PROGRAM

## 2024-07-25 PROCEDURE — 250N000012 HC RX MED GY IP 250 OP 636 PS 637: Performed by: FAMILY MEDICINE

## 2024-07-25 PROCEDURE — 85610 PROTHROMBIN TIME: CPT

## 2024-07-25 PROCEDURE — 85025 COMPLETE CBC W/AUTO DIFF WBC: CPT | Performed by: STUDENT IN AN ORGANIZED HEALTH CARE EDUCATION/TRAINING PROGRAM

## 2024-07-25 RX ORDER — ENOXAPARIN SODIUM 150 MG/ML
1 INJECTION SUBCUTANEOUS EVERY 12 HOURS
Status: DISCONTINUED | OUTPATIENT
Start: 2024-07-25 | End: 2024-07-25

## 2024-07-25 RX ORDER — ENOXAPARIN SODIUM 100 MG/ML
100 INJECTION SUBCUTANEOUS EVERY 12 HOURS
Status: DISCONTINUED | OUTPATIENT
Start: 2024-07-25 | End: 2024-07-25 | Stop reason: HOSPADM

## 2024-07-25 RX ORDER — ENOXAPARIN SODIUM 100 MG/ML
100 INJECTION SUBCUTANEOUS EVERY 12 HOURS
Qty: 6 ML | Refills: 0 | Status: SHIPPED | OUTPATIENT
Start: 2024-07-25 | End: 2024-07-25

## 2024-07-25 RX ORDER — METOPROLOL TARTRATE 25 MG/1
12.5 TABLET, FILM COATED ORAL 2 TIMES DAILY
Qty: 60 TABLET | Refills: 0 | Status: SHIPPED | OUTPATIENT
Start: 2024-07-25

## 2024-07-25 RX ORDER — OXYCODONE HYDROCHLORIDE 5 MG/1
5 TABLET ORAL EVERY 4 HOURS PRN
Qty: 9 TABLET | Refills: 0 | Status: SHIPPED | OUTPATIENT
Start: 2024-07-25

## 2024-07-25 RX ORDER — ENOXAPARIN SODIUM 150 MG/ML
1 INJECTION SUBCUTANEOUS EVERY 12 HOURS
Qty: 4.2 ML | Refills: 0 | Status: SHIPPED | OUTPATIENT
Start: 2024-07-25 | End: 2024-07-25

## 2024-07-25 RX ORDER — NITROFURANTOIN 25; 75 MG/1; MG/1
100 CAPSULE ORAL 2 TIMES DAILY
Qty: 7 CAPSULE | Refills: 0 | Status: SHIPPED | OUTPATIENT
Start: 2024-07-25 | End: 2024-07-29

## 2024-07-25 RX ORDER — ENOXAPARIN SODIUM 100 MG/ML
100 INJECTION SUBCUTANEOUS EVERY 12 HOURS
Qty: 6 ML | Refills: 0 | Status: SHIPPED | OUTPATIENT
Start: 2024-07-25 | End: 2024-09-04

## 2024-07-25 RX ADMIN — AZATHIOPRINE 75 MG: 50 TABLET ORAL at 07:43

## 2024-07-25 RX ADMIN — SENNOSIDES 8.6 MG: 8.6 TABLET, FILM COATED ORAL at 07:42

## 2024-07-25 RX ADMIN — ACETAMINOPHEN 650 MG: 325 TABLET, FILM COATED ORAL at 07:53

## 2024-07-25 RX ADMIN — NITROFURANTOIN MONOHYDRATE/MACROCRYSTALS 100 MG: 75; 25 CAPSULE ORAL at 07:42

## 2024-07-25 RX ADMIN — Medication 25 MCG: at 07:42

## 2024-07-25 RX ADMIN — ATORVASTATIN CALCIUM 20 MG: 20 TABLET, FILM COATED ORAL at 07:42

## 2024-07-25 RX ADMIN — Medication 12.5 MG: at 07:42

## 2024-07-25 RX ADMIN — SPIRONOLACTONE 50 MG: 25 TABLET ORAL at 07:42

## 2024-07-25 RX ADMIN — ACETAMINOPHEN 650 MG: 325 TABLET, FILM COATED ORAL at 17:48

## 2024-07-25 RX ADMIN — Medication 12.5 MG: at 19:57

## 2024-07-25 RX ADMIN — NITROFURANTOIN MONOHYDRATE/MACROCRYSTALS 100 MG: 75; 25 CAPSULE ORAL at 19:57

## 2024-07-25 RX ADMIN — ENOXAPARIN SODIUM 100 MG: 100 INJECTION SUBCUTANEOUS at 17:37

## 2024-07-25 RX ADMIN — OXYCODONE HYDROCHLORIDE 5 MG: 5 TABLET ORAL at 19:57

## 2024-07-25 RX ADMIN — HYDROCHLOROTHIAZIDE 50 MG: 50 TABLET ORAL at 07:42

## 2024-07-25 ASSESSMENT — ACTIVITIES OF DAILY LIVING (ADL)
ADLS_ACUITY_SCORE: 32

## 2024-07-25 NOTE — PROGRESS NOTES
"   VS: /65   Pulse 64   Temp 98.4  F (36.9  C) (Oral)   Resp 18   Ht 1.721 m (5' 7.76\")   Wt 107.1 kg (236 lb 1.6 oz)   SpO2 97%   BMI 36.16 kg/m     O2: Stable on RA, no SOB   Output: Continent of bowel and bladder   Last BM: 7/24   Activity: SBA walker   Skin: Visible skin intact   Pain: Managed with PRN pain medication - see MAR              CMS: A&O x4. No N/V or numbness/tingling reported per pt.   Dressing: N/A   Diet: Regular   LDA: L PIV SL   Equipment: IV pole, personal belongings   Plan: Call light in reach, continue POC   Additional Info: Expected discharge today      "

## 2024-07-25 NOTE — PLAN OF CARE
Goal Outcome Evaluation:      Plan of Care Reviewed With: patient    Overall Patient Progress: improving    VSS on RA. Pt  is alert and oriented X4, uses call light appropriately to make needs known to staff. Up with SBA with walker and gait belt. L lower forearm SL. Regular diet. Oxycodone 7.5 mg x1 given for pain management. No acute events noted this shift. Call light within reach, plan of care ongoing.

## 2024-07-26 ENCOUNTER — TELEPHONE (OUTPATIENT)
Dept: INTERVENTIONAL RADIOLOGY/VASCULAR | Facility: CLINIC | Age: 84
End: 2024-07-26
Payer: COMMERCIAL

## 2024-07-26 NOTE — TELEPHONE ENCOUNTER
The following was shared with Cara's daughter:    INTERVENTIONAL RADIOLOGY INSTRUCTIONS     You are scheduled for an upcoming procedure in the Interventional Radiology Dept at [facility].     Date: 7/30     Procedure: Biopsy     Address: [facility]                 [address] [place] [address]       There is  parking only for patients with limited mobility at this time.  Patient/Visitor ramp is on the corner of [address].     Check into the [facility] Waiting room at: 11:30 am    Do not eat any food after: 3:30 am  You may drink clear liquids until 9:30 am then nothing to drink until after your procedure.  Clear liquids are: water, apple juice, black coffee (no cream, sugar or milk), gatorade, jello    Hold Lovenox for 24 hours before procedure. All other medications can be taken with clear liquids.     Two visitors may accompany you to your procedure.  You will need to have someone available to drive you home.  We recommend that you have someone stay with you 1-2 hours after you get home.     If you have any questions, please call the IR nurses at [phone].     Thank you!

## 2024-07-26 NOTE — PROGRESS NOTES
6MS DISCHARGE    D: Patient discharged to home at 2100. Patient accompanied by granddaughter.    I: Discharge prescriptions given to patient. All discharge medications and instructions reviewed with patient and granddaughter. Enoxaparin not included on the medication bag, contacted pharmacy and nelson's team, doctor done e-prescription for enoxaparin. Granddaughter talked to provider and was informed to get the medication on the chosen pharmacy.   Patient instructed to seek care if experiencing worsening symptoms. Other phone numbers to call with questions or concerns after discharge reviewed. PIV removed. Education completed.    A: Py and granddaughter verbalized understanding of discharge medications and instructions. Belongings remain with patient.    P: Patient to have appointment with provider as verbalized by pt. Biopsy will be scheduled as outpatient.

## 2024-07-26 NOTE — PLAN OF CARE
Physical Therapy Discharge Summary    Reason for therapy discharge:    Discharged to home with home therapy.    Progress towards therapy goal(s). See goals on Care Plan in [facility] electronic health record for goal details.  Goals partially met.  Barriers to achieving goals:   discharge from facility.    Therapy recommendation(s):    Continued therapy is recommended.  Rationale/Recommendations:  rec continued therapy via home PT.

## 2024-07-30 ENCOUNTER — APPOINTMENT (OUTPATIENT)
Dept: INTERVENTIONAL RADIOLOGY/VASCULAR | Facility: CLINIC | Age: 84
End: 2024-07-30
Attending: PHYSICIAN ASSISTANT
Payer: COMMERCIAL

## 2024-07-30 ENCOUNTER — APPOINTMENT (OUTPATIENT)
Dept: MEDSURG UNIT | Facility: CLINIC | Age: 84
End: 2024-07-30
Attending: RADIOLOGY
Payer: COMMERCIAL

## 2024-07-30 ENCOUNTER — HOSPITAL ENCOUNTER (OUTPATIENT)
Facility: CLINIC | Age: 84
Discharge: HOME | End: 2024-07-30
Attending: RADIOLOGY | Admitting: RADIOLOGY
Payer: COMMERCIAL

## 2024-07-30 VITALS
RESPIRATION RATE: 20 BRPM | OXYGEN SATURATION: 98 % | HEIGHT: 67 IN | BODY MASS INDEX: 35.94 KG/M2 | SYSTOLIC BLOOD PRESSURE: 114 MMHG | TEMPERATURE: 97.7 F | DIASTOLIC BLOOD PRESSURE: 74 MMHG | WEIGHT: 229 LBS | HEART RATE: 72 BPM

## 2024-07-30 DIAGNOSIS — S22.089A CLOSED FRACTURE OF ELEVENTH THORACIC VERTEBRA, UNSPECIFIED FRACTURE MORPHOLOGY, INITIAL ENCOUNTER (H): ICD-10-CM

## 2024-07-30 DIAGNOSIS — M89.9 LYTIC BONE LESIONS ON XRAY: ICD-10-CM

## 2024-07-30 PROCEDURE — 999N000132 HC STATISTIC PP CARE STAGE 1

## 2024-07-30 PROCEDURE — 250N000011 HC RX IP 250 OP 636: Performed by: STUDENT IN AN ORGANIZED HEALTH CARE EDUCATION/TRAINING PROGRAM

## 2024-07-30 PROCEDURE — 258N000003 HC RX IP 258 OP 636: Performed by: NURSE PRACTITIONER

## 2024-07-30 PROCEDURE — 88305 TISSUE EXAM BY PATHOLOGIST: CPT | Mod: 26 | Performed by: PATHOLOGY

## 2024-07-30 PROCEDURE — 88342 IMHCHEM/IMCYTCHM 1ST ANTB: CPT | Mod: 26 | Performed by: PATHOLOGY

## 2024-07-30 PROCEDURE — 77012 CT SCAN FOR NEEDLE BIOPSY: CPT | Mod: 26 | Performed by: RADIOLOGY

## 2024-07-30 PROCEDURE — 88341 IMHCHEM/IMCYTCHM EA ADD ANTB: CPT | Mod: 26 | Performed by: PATHOLOGY

## 2024-07-30 PROCEDURE — 88369 M/PHMTRC ALYSISHQUANT/SEMIQ: CPT | Mod: 26 | Performed by: MEDICAL GENETICS

## 2024-07-30 PROCEDURE — 99152 MOD SED SAME PHYS/QHP 5/>YRS: CPT

## 2024-07-30 PROCEDURE — 88271 CYTOGENETICS DNA PROBE: CPT | Performed by: PHYSICIAN ASSISTANT

## 2024-07-30 PROCEDURE — 88365 INSITU HYBRIDIZATION (FISH): CPT | Mod: 26 | Performed by: PATHOLOGY

## 2024-07-30 PROCEDURE — 250N000009 HC RX 250: Performed by: STUDENT IN AN ORGANIZED HEALTH CARE EDUCATION/TRAINING PROGRAM

## 2024-07-30 PROCEDURE — 88342 IMHCHEM/IMCYTCHM 1ST ANTB: CPT | Mod: TC | Performed by: PHYSICIAN ASSISTANT

## 2024-07-30 PROCEDURE — 99152 MOD SED SAME PHYS/QHP 5/>YRS: CPT | Mod: GC | Performed by: RADIOLOGY

## 2024-07-30 PROCEDURE — 88368 INSITU HYBRIDIZATION MANUAL: CPT | Mod: 26 | Performed by: MEDICAL GENETICS

## 2024-07-30 PROCEDURE — 272N000505 HC NEEDLE CR5

## 2024-07-30 PROCEDURE — 20225 BONE BIOPSY TROCAR/NDL DEEP: CPT | Mod: GC | Performed by: RADIOLOGY

## 2024-07-30 PROCEDURE — 999N000142 HC STATISTIC PROCEDURE PREP ONLY

## 2024-07-30 PROCEDURE — 88360 TUMOR IMMUNOHISTOCHEM/MANUAL: CPT | Mod: 26 | Performed by: PATHOLOGY

## 2024-07-30 RX ORDER — FENTANYL CITRATE 50 UG/ML
25-50 INJECTION, SOLUTION INTRAMUSCULAR; INTRAVENOUS EVERY 5 MIN PRN
Status: DISCONTINUED | OUTPATIENT
Start: 2024-07-30 | End: 2024-07-30 | Stop reason: HOSPADM

## 2024-07-30 RX ORDER — LIDOCAINE 40 MG/G
CREAM TOPICAL
Status: DISCONTINUED | OUTPATIENT
Start: 2024-07-30 | End: 2024-07-30 | Stop reason: HOSPADM

## 2024-07-30 RX ORDER — NALOXONE HYDROCHLORIDE 0.4 MG/ML
0.2 INJECTION, SOLUTION INTRAMUSCULAR; INTRAVENOUS; SUBCUTANEOUS
Status: DISCONTINUED | OUTPATIENT
Start: 2024-07-30 | End: 2024-07-30 | Stop reason: HOSPADM

## 2024-07-30 RX ORDER — FLUMAZENIL 0.1 MG/ML
0.2 INJECTION, SOLUTION INTRAVENOUS
Status: DISCONTINUED | OUTPATIENT
Start: 2024-07-30 | End: 2024-07-30 | Stop reason: HOSPADM

## 2024-07-30 RX ORDER — NALOXONE HYDROCHLORIDE 0.4 MG/ML
0.4 INJECTION, SOLUTION INTRAMUSCULAR; INTRAVENOUS; SUBCUTANEOUS
Status: DISCONTINUED | OUTPATIENT
Start: 2024-07-30 | End: 2024-07-30 | Stop reason: HOSPADM

## 2024-07-30 RX ORDER — SODIUM CHLORIDE 9 MG/ML
INJECTION, SOLUTION INTRAVENOUS CONTINUOUS
Status: DISCONTINUED | OUTPATIENT
Start: 2024-07-30 | End: 2024-07-30 | Stop reason: HOSPADM

## 2024-07-30 RX ADMIN — FENTANYL CITRATE 25 MCG: 50 INJECTION, SOLUTION INTRAMUSCULAR; INTRAVENOUS at 13:53

## 2024-07-30 RX ADMIN — FENTANYL CITRATE 25 MCG: 50 INJECTION, SOLUTION INTRAMUSCULAR; INTRAVENOUS at 13:58

## 2024-07-30 RX ADMIN — MIDAZOLAM 0.5 MG: 1 INJECTION INTRAMUSCULAR; INTRAVENOUS at 13:45

## 2024-07-30 RX ADMIN — LIDOCAINE HYDROCHLORIDE 12 ML: 10 INJECTION, SOLUTION EPIDURAL; INFILTRATION; INTRACAUDAL; PERINEURAL at 14:01

## 2024-07-30 RX ADMIN — MIDAZOLAM 0.5 MG: 1 INJECTION INTRAMUSCULAR; INTRAVENOUS at 13:58

## 2024-07-30 RX ADMIN — MIDAZOLAM 0.5 MG: 1 INJECTION INTRAMUSCULAR; INTRAVENOUS at 13:53

## 2024-07-30 RX ADMIN — FENTANYL CITRATE 25 MCG: 50 INJECTION, SOLUTION INTRAMUSCULAR; INTRAVENOUS at 13:45

## 2024-07-30 RX ADMIN — FENTANYL CITRATE 25 MCG: 50 INJECTION, SOLUTION INTRAMUSCULAR; INTRAVENOUS at 13:33

## 2024-07-30 RX ADMIN — MIDAZOLAM 1 MG: 1 INJECTION INTRAMUSCULAR; INTRAVENOUS at 13:39

## 2024-07-30 RX ADMIN — SODIUM CHLORIDE: 9 INJECTION, SOLUTION INTRAVENOUS at 12:18

## 2024-07-30 RX ADMIN — FENTANYL CITRATE 50 MCG: 50 INJECTION, SOLUTION INTRAMUSCULAR; INTRAVENOUS at 13:40

## 2024-07-30 RX ADMIN — MIDAZOLAM 0.5 MG: 1 INJECTION INTRAMUSCULAR; INTRAVENOUS at 13:33

## 2024-07-30 ASSESSMENT — ACTIVITIES OF DAILY LIVING (ADL)
ADLS_ACUITY_SCORE: 38
ADLS_ACUITY_SCORE: 38
ADLS_ACUITY_SCORE: 37
ADLS_ACUITY_SCORE: 38
ADLS_ACUITY_SCORE: 38

## 2024-07-30 NOTE — IR NOTE
Patient Name: Cara Cool  Medical Record Number: [id number]  Today's Date: 7/30/2024    Procedure: Thoracic Vertebral Body Biopsy  Proceduralist: Dr Alexandrea Noriega  Pathology present: no    Procedure Start: 1333  Procedure end: 1404  Sedation medications administered: 3 mg Midazolam, 150 mcg Fentanyl     2  cores sent in formalin    Report given to: GAGE Anderson RN      Other Notes: Pt arrived to IR room CT2 from . Consent reviewed. Pt denies any questions or concerns regarding procedure. Pt positioned prone and monitored per protocol. Pt tolerated procedure without any noted complications. Pt transferred back to .

## 2024-07-30 NOTE — PRE-PROCEDURE
GENERAL PRE-PROCEDURE:   Procedure:  CT guided T11 vertebral body bone biopsy  Date/Time:  7/30/2024 12:54 PM    Written consent obtained?: Yes    Risks and benefits: Risks, benefits and alternatives were discussed    Consent given by:  Patient  Patient states understanding of procedure being performed: Yes    Patient's understanding of procedure matches consent: Yes    Procedure consent matches procedure scheduled: Yes    Expected level of sedation:  Moderate  Appropriately NPO:  Yes  ASA Class:  3  Mallampati  :  Grade 3- soft palate visible, posterior pharyngeal wall not visible  Lungs:  Lungs clear with good breath sounds bilaterally  Heart:  Normal heart sounds and rate  History & Physical reviewed:  History and physical reviewed and no updates needed  Statement of review:  I have reviewed the lab findings, diagnostic data, medications, and the plan for sedation

## 2024-07-30 NOTE — PROGRESS NOTES
Cara arrived back to 2A from IR, verterbral body bone biopsy.  Small primapore clean and dry on lower left back. No hematoma.  VSS, patient eating and drinking.  One hour bedrest

## 2024-07-30 NOTE — PROGRESS NOTES
Pt is here for Deep bone biopsy.  Up on arrival t o 2A: pt is stable, aVSS and pain free.  PIV in. IVF.  Labs were done 7/25/24 and results reviewed.    Consent is singed.     Denis ([facility]) is here with pt.  [phone]

## 2024-07-30 NOTE — DISCHARGE INSTRUCTIONS
[facility]    Interventional Radiology  Patient Instructions Following Biopsy    AFTER YOU GO HOME  If you were given sedation, for the first 24 hours: we recommend that an adult stay with you, DO NOT drive or operate machinery at home or at work, DO NOT smoke, DO NOT make any important or legal decisions.  DO NOT drink alcoholic beverages the day of your procedure  Drink plenty of fluids   Resume your regular diet, unless otherwise instructed by your Primary Physician  Relax and take it easy for 48 hours  DO NOT do any strenuous exercise or lifting (> 10 lbs) for at least 3 days following your procedure  Keep the dressing dry and in place for 24 hours. Replace with Band aid for 2 days.  Never leave a wet dressing in place.  Do not take a shower for at least 12 hours following your procedure. No tub bath, hot tub, or swimming for 5 days.  There should be minimum drainage from the biopsy site    CALL THE PHYSICIAN IF:  You start bleeding from the procedure site.  If you do start to bleed from that site, lie down flat and hold pressure on the site for a minimum of 10 minutes.  Your physician will tell you if you need to return to the hospital  You develop nausea or vomiting  You have excessive swelling, redness, or tenderness at the site  You have drainage that looks like it is infected.  You experience severe pain  You develop hives or a rash or unexplained itching  You develop shortness of breath  You develop a temperature of 101 degrees F or greater  You develop chest pain or cough up blood, lightheadedness or fainting    ADDITIONAL INSTRUCTIONS  If you are taking Coumadin, restart tonight.  Follow up with your Coumadin Clinic or Primary Care MD to have your INR rechecked.    [facility] INTERVENTIONAL RADIOLOGY DEPARTMENT  Procedure Physicians: Dr Guy, Dr Lechuga                                      Date of procedure: July 30, 2024  Telephone Numbers: [phone]      Monday-Friday 7:30 am to 4:00  pm  [phone] After 4:00 pm Monday-Friday, Weekends & Holidays.   Ask for the Interventional Radiologist on call.  Someone is on call 24 hrs/day  [facility] toll free number: [phone] Monday-Friday 8:00 am to 4:30 pm  [facility] Emergency Dept: [phone]

## 2024-07-30 NOTE — PROGRESS NOTES
D/I/A:  Patient is tolerating liquids and foods, ambulating. A+O x4. IR puncture site clean dry and flat,no bleeding or hematoma; CMS intact. IV access removed.  Education completed and outlined in AVS Belongings returned to patient at discharge.    P: Discharged to home with daughter. Patient to follow up with appts as per discharge instruction.

## 2024-08-02 LAB
PATH REPORT.COMMENTS IMP SPEC: ABNORMAL
PATH REPORT.COMMENTS IMP SPEC: YES
PATH REPORT.FINAL DX SPEC: ABNORMAL
PATH REPORT.GROSS SPEC: ABNORMAL
PATH REPORT.MICROSCOPIC SPEC OTHER STN: ABNORMAL
PATH REPORT.RELEVANT HX SPEC: ABNORMAL
PHOTO IMAGE: ABNORMAL

## 2024-08-05 ENCOUNTER — TRANSCRIBE ORDERS (OUTPATIENT)
Dept: OTHER | Age: 84
End: 2024-08-05

## 2024-08-05 DIAGNOSIS — M89.9 LYTIC BONE LESIONS ON XRAY: Primary | ICD-10-CM

## 2024-08-05 DIAGNOSIS — S22.089A CLOSED FRACTURE OF ELEVENTH THORACIC VERTEBRA, UNSPECIFIED FRACTURE MORPHOLOGY, INITIAL ENCOUNTER (H): ICD-10-CM

## 2024-08-06 ENCOUNTER — PATIENT OUTREACH (OUTPATIENT)
Dept: ONCOLOGY | Facility: CLINIC | Age: 84
End: 2024-08-06
Payer: COMMERCIAL

## 2024-08-06 NOTE — PROGRESS NOTES
New Patient Oncology Nurse Navigator Note     Referral Received: 08/06/24      Referring provider:     Tavia Christianson MD     Referring Clinic/Organization: [facility]     Referred to: Malignant Hematology    Requested provider (if applicable): First available - did not specify      Evaluation for :   Diagnosis   M89.9 (ICD-10-CM) - Lytic bone lesions on xray   S22.089A (ICD-10-CM) - Closed fracture of eleventh thoracic vertebra, unspecified fracture morphology, initial encounter (H)      Clinical History (per Nurse review of records provided):      Left T12 transverse process lytic lesion consistent with neoplasm, multifocal osteoblastic mets     Case Report   Surgical Pathology Report                         Case: [id number]                                   Authorizing Provider:  Luc Servin, Collected:           07/30/2024 02:02 PM                                  DO                                                                           Ordering Location:     [facility]     Received:            07/30/2024 02:30 PM                                  [address] [place]                                                             Pathologist:           Billy Solorio MD                                                             Specimen:    Vertebra, Vertebral biopsy, thoracic                                                      Final Diagnosis   Vertebra, thoracic, core needle biopsy:  - Diffuse large B cell lymphoma, germinal center subtype (see comment)  - Negative for EBV      Narrative & Impression   EXAM: MR THORACIC SPINE W CONTRAST, MR LUMBAR SPINE W CONTRAST   7/19/2024 11:14 PM      HISTORY: Hx of MGUS, lytic lesion on T12, concern for metastasis vs  primary lesion        COMPARISON: Thoracolumbar MRI 7/18/2024     TECHNIQUE: Axial and sagittal T1 postcontrast images of the thoracic  and lumbar spine obtained.     CONTRAST: 10.7ml gadavist.     FINDINGS:  Reading in  "conjunction with the thoracal lumbar spine dated 7/18/2024.     Diffuse multifocal thoracolumbar vertebral body enhancing foci  correlating with the areas of T1 hypointense foci seen on MRI from  7/18/2024.  Especially notable areas of enhancement include but not limited to:  T2, predominantly in the posterior vertebral body  T4, predominantly in the posterior vertebral body  T5, predominantly in the posterior vertebral body  T8, predominantly in the right inferior vertebral body  T11, diffusely throughout the vertebral body     L1, predominantly in the right inferior posterior vertebral body  L2, predominantly in the left anterolateral posterior vertebral body     S1, primarily in the right sacral ala  Right ischium, immediately to the right of the sacroiliac joints  Left ischium, involving a significant portion of the left iliac wing                                                                      IMPRESSION:   Multifocal enhancing lesions in the thoracolumbar, proximal sacrum,  and bilateral ischium correlating with the areas of abnormal signal on  the noncontrast thoracolumbar MRI 7/18/2024. These are concerning for  metastasis.       Records Location: [facility]     Records Needed:     N/A    Additional testing needed prior to consult:     PET?    Referral updates and Plan:     08/06/2024 8:57 AM -  Referral received and reviewed. Reached out to Dr. Yu for urgency.     08/07/2024 9:08 AM -  Per Dr. Yu, ok to put in on 8/15 return slot due to referral getting \"lost\" in the system.  Called daughter at this time to assure we are working on getting her mom scheduled next week, I am just waiting on orders for testing prior to the appointment. Daughter agrees with plan.     08/08/2024 8:54 AM -  Spoke with daughter again.  Got pt scheduled with Dr. Yu on 8/15 with labs prior but did a lab appointment as well.  No labs placed at this time.  Messaged Dr. Yu for orders.     08/09/2024 9:48 AM -  Dr. Yu " added PET scan to orders.  Called daughter at this time and reviewed with patient that a PET scan is an effective way to help identity a variety of conditions and uses a glucose or sugar-based tracer that shows both normal and abnormal metabolic activity in tissue. Explained to patient that there is a PET prep that will be provided to them by the  that they will need to follow 24 hours prior.    Transferred to NPS for scheduling.     MANUEL VelázquezN, RN  Hematology/Oncology Nurse Navigator  [facility]  [phone] / [phone]

## 2024-08-08 DIAGNOSIS — C83.398 DIFFUSE LARGE B-CELL LYMPHOMA OF EXTRANODAL SITE: Primary | ICD-10-CM

## 2024-08-08 DIAGNOSIS — Z11.59 ENCOUNTER FOR SCREENING FOR OTHER VIRAL DISEASES: ICD-10-CM

## 2024-08-13 NOTE — TELEPHONE ENCOUNTER
RECORDS STATUS - ALL OTHER DIAGNOSIS      RECORDS RECEIVED FROM: [facility]   DATE RECEIVED: 8/13   NOTES STATUS DETAILS   OFFICE NOTE from medical oncologist DAVON Pak  Dr. Chavis Rank: 1/25/24   DISCHARGE SUMMARY from hospital [facility] 7/30/24, 7/17/24   OPERATIVE REPORT [facility]  12/13/19: Parathyroidectomy  6/4/18: Laparoscopic Cryoablation Kidney    [facility]  5/19/15: Right Total Knee Arthroplasty  3/12/13: Left Total Hip Arthoplasty  6/13/07: Colonoscopy   MEDICATION LIST [facility] 7/25/24   LABS     PATHOLOGY REPORTS [facility] 7/30/24: [id number]   ANYTHING RELATED TO DIAGNOSIS Epic 7/25/24   IMAGING (NEED IMAGES & REPORT)     CT SCANS PACS [facility]   MRI PACS [facility]

## 2024-08-15 ENCOUNTER — LAB (OUTPATIENT)
Dept: LAB | Facility: CLINIC | Age: 84
End: 2024-08-15
Attending: STUDENT IN AN ORGANIZED HEALTH CARE EDUCATION/TRAINING PROGRAM
Payer: COMMERCIAL

## 2024-08-15 ENCOUNTER — PRE VISIT (OUTPATIENT)
Dept: ONCOLOGY | Facility: CLINIC | Age: 84
End: 2024-08-15

## 2024-08-15 ENCOUNTER — ONCOLOGY VISIT (OUTPATIENT)
Dept: ONCOLOGY | Facility: CLINIC | Age: 84
End: 2024-08-15
Payer: COMMERCIAL

## 2024-08-15 ENCOUNTER — HOSPITAL ENCOUNTER (OUTPATIENT)
Dept: PET IMAGING | Facility: CLINIC | Age: 84
Discharge: HOME | End: 2024-08-15
Attending: STUDENT IN AN ORGANIZED HEALTH CARE EDUCATION/TRAINING PROGRAM | Admitting: STUDENT IN AN ORGANIZED HEALTH CARE EDUCATION/TRAINING PROGRAM
Payer: COMMERCIAL

## 2024-08-15 VITALS
HEIGHT: 67 IN | RESPIRATION RATE: 17 BRPM | HEART RATE: 67 BPM | TEMPERATURE: 97.6 F | WEIGHT: 233.5 LBS | DIASTOLIC BLOOD PRESSURE: 55 MMHG | BODY MASS INDEX: 36.65 KG/M2 | SYSTOLIC BLOOD PRESSURE: 123 MMHG

## 2024-08-15 DIAGNOSIS — Z11.59 ENCOUNTER FOR SCREENING FOR OTHER VIRAL DISEASES: ICD-10-CM

## 2024-08-15 DIAGNOSIS — C83.398 DIFFUSE LARGE B-CELL LYMPHOMA OF EXTRANODAL SITE: ICD-10-CM

## 2024-08-15 DIAGNOSIS — Z51.11 ENCOUNTER FOR ANTINEOPLASTIC CHEMOTHERAPY: ICD-10-CM

## 2024-08-15 DIAGNOSIS — C83.398 DIFFUSE LARGE B-CELL LYMPHOMA OF EXTRANODAL SITE: Primary | ICD-10-CM

## 2024-08-15 LAB
ALBUMIN SERPL BCG-MCNC: 3.8 G/DL (ref 3.5–5.2)
ALP SERPL-CCNC: 236 U/L (ref 40–150)
ALT SERPL W P-5'-P-CCNC: 6 U/L (ref 0–50)
ANION GAP SERPL CALCULATED.3IONS-SCNC: 11 MMOL/L (ref 7–15)
AST SERPL W P-5'-P-CCNC: 22 U/L (ref 0–45)
BASOPHILS # BLD AUTO: 0 10E3/UL (ref 0–0.2)
BASOPHILS NFR BLD AUTO: 0 %
BILIRUB SERPL-MCNC: 0.8 MG/DL
BUN SERPL-MCNC: 23.8 MG/DL (ref 8–23)
CALCIUM SERPL-MCNC: 9.9 MG/DL (ref 8.8–10.4)
CHLORIDE SERPL-SCNC: 106 MMOL/L (ref 98–107)
CREAT SERPL-MCNC: 0.9 MG/DL (ref 0.51–0.95)
EGFRCR SERPLBLD CKD-EPI 2021: 64 ML/MIN/1.73M2
EOSINOPHIL # BLD AUTO: 0.5 10E3/UL (ref 0–0.7)
EOSINOPHIL NFR BLD AUTO: 10 %
ERYTHROCYTE [DISTWIDTH] IN BLOOD BY AUTOMATED COUNT: 16.2 % (ref 10–15)
GLUCOSE SERPL-MCNC: 87 MG/DL (ref 70–99)
HBV CORE AB SERPL QL IA: NONREACTIVE
HBV SURFACE AB SERPL IA-ACNC: <3.5 M[IU]/ML
HBV SURFACE AB SERPL IA-ACNC: NONREACTIVE M[IU]/ML
HBV SURFACE AG SERPL QL IA: NONREACTIVE
HCO3 SERPL-SCNC: 25 MMOL/L (ref 22–29)
HCT VFR BLD AUTO: 42.3 % (ref 35–47)
HCV AB SERPL QL IA: NONREACTIVE
HGB BLD-MCNC: 13.3 G/DL (ref 11.7–15.7)
IMM GRANULOCYTES # BLD: 0 10E3/UL
IMM GRANULOCYTES NFR BLD: 0 %
LDH SERPL L TO P-CCNC: 174 U/L (ref 0–250)
LYMPHOCYTES # BLD AUTO: 0.9 10E3/UL (ref 0.8–5.3)
LYMPHOCYTES NFR BLD AUTO: 19 %
MCH RBC QN AUTO: 29 PG (ref 26.5–33)
MCHC RBC AUTO-ENTMCNC: 31.4 G/DL (ref 31.5–36.5)
MCV RBC AUTO: 92 FL (ref 78–100)
MONOCYTES # BLD AUTO: 0.6 10E3/UL (ref 0–1.3)
MONOCYTES NFR BLD AUTO: 13 %
NEUTROPHILS # BLD AUTO: 2.8 10E3/UL (ref 1.6–8.3)
NEUTROPHILS NFR BLD AUTO: 58 %
NRBC # BLD AUTO: 0 10E3/UL
NRBC BLD AUTO-RTO: 0 /100
PLATELET # BLD AUTO: 264 10E3/UL (ref 150–450)
POTASSIUM SERPL-SCNC: 4.4 MMOL/L (ref 3.4–5.3)
PROT SERPL-MCNC: 7.5 G/DL (ref 6.4–8.3)
RBC # BLD AUTO: 4.59 10E6/UL (ref 3.8–5.2)
SODIUM SERPL-SCNC: 142 MMOL/L (ref 135–145)
URATE SERPL-MCNC: 6.9 MG/DL (ref 2.4–5.7)
WBC # BLD AUTO: 4.8 10E3/UL (ref 4–11)

## 2024-08-15 PROCEDURE — 74177 CT ABD & PELVIS W/CONTRAST: CPT | Mod: 26 | Performed by: RADIOLOGY

## 2024-08-15 PROCEDURE — 84550 ASSAY OF BLOOD/URIC ACID: CPT | Performed by: PATHOLOGY

## 2024-08-15 PROCEDURE — 85025 COMPLETE CBC W/AUTO DIFF WBC: CPT | Performed by: PATHOLOGY

## 2024-08-15 PROCEDURE — 99417 PROLNG OP E/M EACH 15 MIN: CPT | Performed by: STUDENT IN AN ORGANIZED HEALTH CARE EDUCATION/TRAINING PROGRAM

## 2024-08-15 PROCEDURE — 87340 HEPATITIS B SURFACE AG IA: CPT | Performed by: STUDENT IN AN ORGANIZED HEALTH CARE EDUCATION/TRAINING PROGRAM

## 2024-08-15 PROCEDURE — G0463 HOSPITAL OUTPT CLINIC VISIT: HCPCS | Performed by: STUDENT IN AN ORGANIZED HEALTH CARE EDUCATION/TRAINING PROGRAM

## 2024-08-15 PROCEDURE — 78816 PET IMAGE W/CT FULL BODY: CPT | Mod: 26 | Performed by: RADIOLOGY

## 2024-08-15 PROCEDURE — 343N000001 HC RX 343: Performed by: STUDENT IN AN ORGANIZED HEALTH CARE EDUCATION/TRAINING PROGRAM

## 2024-08-15 PROCEDURE — 36415 COLL VENOUS BLD VENIPUNCTURE: CPT | Performed by: PATHOLOGY

## 2024-08-15 PROCEDURE — A9552 F18 FDG: HCPCS | Performed by: STUDENT IN AN ORGANIZED HEALTH CARE EDUCATION/TRAINING PROGRAM

## 2024-08-15 PROCEDURE — 78816 PET IMAGE W/CT FULL BODY: CPT | Mod: PI

## 2024-08-15 PROCEDURE — 71260 CT THORAX DX C+: CPT | Mod: 26 | Performed by: RADIOLOGY

## 2024-08-15 PROCEDURE — 99000 SPECIMEN HANDLING OFFICE-LAB: CPT | Performed by: PATHOLOGY

## 2024-08-15 PROCEDURE — 86704 HEP B CORE ANTIBODY TOTAL: CPT | Performed by: STUDENT IN AN ORGANIZED HEALTH CARE EDUCATION/TRAINING PROGRAM

## 2024-08-15 PROCEDURE — 99215 OFFICE O/P EST HI 40 MIN: CPT | Performed by: STUDENT IN AN ORGANIZED HEALTH CARE EDUCATION/TRAINING PROGRAM

## 2024-08-15 PROCEDURE — 83615 LACTATE (LD) (LDH) ENZYME: CPT | Performed by: PATHOLOGY

## 2024-08-15 PROCEDURE — 80053 COMPREHEN METABOLIC PANEL: CPT | Performed by: PATHOLOGY

## 2024-08-15 PROCEDURE — 86803 HEPATITIS C AB TEST: CPT | Performed by: STUDENT IN AN ORGANIZED HEALTH CARE EDUCATION/TRAINING PROGRAM

## 2024-08-15 PROCEDURE — 86706 HEP B SURFACE ANTIBODY: CPT | Performed by: STUDENT IN AN ORGANIZED HEALTH CARE EDUCATION/TRAINING PROGRAM

## 2024-08-15 PROCEDURE — G0463 HOSPITAL OUTPT CLINIC VISIT: HCPCS | Mod: 25 | Performed by: STUDENT IN AN ORGANIZED HEALTH CARE EDUCATION/TRAINING PROGRAM

## 2024-08-15 RX ORDER — PREDNISONE 10 MG/1
60 TABLET ORAL DAILY
Qty: 42 TABLET | Refills: 1 | Status: SHIPPED | OUTPATIENT
Start: 2024-08-15 | End: 2024-08-22

## 2024-08-15 RX ORDER — FLUDEOXYGLUCOSE F 18 200 MCI/ML
10-18 INJECTION, SOLUTION INTRAVENOUS ONCE
Status: COMPLETED | OUTPATIENT
Start: 2024-08-15 | End: 2024-08-15

## 2024-08-15 RX ADMIN — FLUDEOXYGLUCOSE F 18 13.81 MILLICURIE: 200 INJECTION, SOLUTION INTRAVENOUS at 13:27

## 2024-08-15 ASSESSMENT — PAIN SCALES - GENERAL: PAINLEVEL: SEVERE PAIN (7)

## 2024-08-15 NOTE — NURSING NOTE
"Oncology Rooming Note    August 15, 2024 3:41 PM   Cara Cool is a 84 year old female who presents for:    Chief Complaint   Patient presents with    Oncology Clinic Visit     Lytic bone lesions on x-ray     Initial Vitals: /55 (BP Location: Right arm, Patient Position: Sitting, Cuff Size: Adult Regular)   Pulse 67   Temp 97.6  F (36.4  C) (Oral)   Resp 17   Ht 1.689 m (5' 6.5\")   Wt 105.9 kg (233 lb 8 oz)   BMI 37.12 kg/m   Estimated body mass index is 37.12 kg/m  as calculated from the following:    Height as of this encounter: 1.689 m (5' 6.5\").    Weight as of this encounter: 105.9 kg (233 lb 8 oz). Body surface area is 2.23 meters squared.  Severe Pain (7) Comment: Data Unavailable   No LMP recorded. Patient is postmenopausal.  Allergies reviewed: Yes  Medications reviewed: Yes    Medications: Medication refills not needed today.  Pharmacy name entered into [email or web address]: [facility] #[id number] [place], [place] - [address]    Frailty Screening:   Is the patient here for a new oncology consult visit in cancer care? 1. Yes. Over the past month, have you experienced difficulty or required a caregiver to assist with:   1. Balance, walking or general mobility (including any falls)? YES  2. Completion of self-care tasks such as bathing, dressing, toileting, grooming/hygiene?  YES  3. Concentration or memory that affects your daily life?  NO       Clinical concerns: Pt wants to know how long she needs to take the half tab for metoprolol. Pt also reports severe pain in low back. Dr. Yu was notified via message.       Sanjana Haynes, EMT   "

## 2024-08-15 NOTE — PROGRESS NOTES
Cara is an 84 year old female, presenting for the following health issues:  Blood Draw and Oncology Clinic Visit for Diffuse large B cell lymphoma    Subjective    HPI  Oncology History Overview Note   This is an 85 y/o female with a PMH of pAF and saddle PE 2021 on Xarelto, HLD, HTN, TIA, OA, autoimmune hepatitis (on azathioprine), IgM/IgG kappa MGUS (dx 11/2017, follows with Dr. Partha Sherman at [facility]), papillary transitional renal cell carcinoma (s/p cryoablation 6/7/2018), hyperparathyroidism s/p adenoma removal 12/2019, coming in for bx positive DLBCL germinal cell subtype with lytic lesions.     Patient presented to the ED 7/17/2024 with severe and progressive mid-back pain x1 week without recent falls or injuries. Initial imaging revealed lytic lesion of left T12 transverse process and T11 fracture. She was admitted for pain control and further work-up. Additional imaging (CT aortic survey, MRI thoracolumbar, bone scan) had evidence of widespread bony metastasis on thoracolumbar spine, proximal sacrum, bilateral ischium, ribs, and possibly L femur w/o clear evidence of primary source. She completed an outpatient bone bx 7/30 via neurosurgery.    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 The Ki-67 proliferation index is estimated to be 50-60% of the large cell population.        The patient presents today for a new patient visit. She continues to experience severe pain. For the last several days, it is worst in the left low back/buttock area. It does not radiate, and she has no associated weakness or numbness. She finds some relief with the oxycodone 5 mg tablets, especially when needing to exert herself or ambulate, but overall she is continuing to experience severe pain. She is managing her bowel movements with Miralax, denies constipation. She is able to ambulate with the use of a cane,  "no falls. She is able to sleep.    Objective  /55 (BP Location: Right arm, Patient Position: Sitting, Cuff Size: Adult Regular)   Pulse 67   Temp 97.6  F (36.4  C) (Oral)   Resp 17   Ht 1.689 m (5' 6.5\")   Wt 105.9 kg (233 lb 8 oz)   BMI 37.12 kg/m    Physical Exam   GENERAL:  Alert oriented well nourished  HEAD: normocephalic atraumatic, edentulous   SKIN:  no rash, hives, other lesions.  LYMPHATIC: no abnormal lymph nodes palable in cervical, axillary, supraclavicular or inguinal area.  RESP:  No rales or rhonchi, breath sounds bilaterally equal and vesicular  CV:  No tachycardia, S1 S2 normal No murmur.  GI:  Abdomen soft nontender no hepato or splenomegaly  MUSCULOSKELETAL:  No visible joint redness or swelling. No cervical, thoracic, or lumbar midline tenderness, though tenderness with minimal palpation of the left buttock. Able to stand and ambulate, though with pain.  NEURO:  No gross weakness gait normal  PSYCH: pleasant affect    Labs: I personally reviewed complete blood count, differential, renal function test, liver function tests LDH.  No cytopenias, LFTs within normal limits, renal function test within normal limits and LDH is normal as well. Uric acid is elevated to 6.9. Alkaline phosphatase midly elevated.    Pathology:  Vertebra, thoracic, core needle biopsy:  - Diffuse large B cell lymphoma, germinal center subtype (see comment)  - Negative for EBV     Imaging: I personally reviewed PETCT neck/chest/abdomen/pelvis and discussed with the patient.  Major disease burden is in the bones with multiple enhancing lesions..    Assessment and Plan:     1) DLBCL germinal cell subtype  Presents with acute onset severe pain, bx positive for DLBCL germinal cell subtype. We discussed the pathogenesis and initial prognosis of the diagnosis today, as well as the available treatments. IPI score of 2. Will plan to treat with a curative intent with R-mini CHOP, 6 cycles every 3 weeks. Has completed a " recent TTE with EF of 70%. Cytogenetics are pending, though would not . Briefly reviewed PET scan without clear nerve involvement, will await formal read. Should formal read suggest nerve involvement, will  pursue CNS disease screen with LP and MRI. Port placement discussed today includes process and benefits, will plan to give first cycle peripherally and revisit discussion at next visit.  - Regimen dosings, schedule, common toxicities were discussed with the patient.  Common toxicities including but not limited to nausea vomiting diarrhea rashes neuropathy was discussed.  Patient verbalized understanding of the information and all his questions were answered.  Patient consented for chemotherapy.      - Will obtain hepatitis and HIV studies with next lab draw, no obvious risk factors for HIV or hepatitis.  - Revisit port discussion at next visit  - Start 1 cycle of R-mini CHOP in the next 1-2 weeks via peripheral IV  - PET after 2 cycles  - Prednisone 60 mg x 5 days for current pain.  - Will plan to administer bactrim and acyclovir for infection ppx  - Will plan for Neulasta for cytopenias     2) Autoimmune hepatitis  - Hold azathioprine. Patient and her daughter were notified to discuss with rheumatology and hold azathioprime. Chemotherapy will likely suppress immunity enough to control hepatitis.    3) MGUS:   - She has diagnosis of MGUS, but recent bone biopsy is diagnostic of DLBCL. We will monitor MGUS while pursuing treatment for lymphoma.    Lisa Purvis DO  PGY2 Internal Medicine Resident  Hematology/Oncology     I saw and examined the patient with resident. I discussed assessment and plan. I agree with findings documented in resident note.    Total time spent on date of service in review of medical records, review of labs, history taking, physical exam, discussion of assessment and plan, counseling and patient education is 90 minutes.    Darya Yu MD  Attending Physician  Pager  [phone]

## 2024-08-15 NOTE — LETTER
8/15/2024      Cara Cool  [address] [place] [id number]      Dear Colleague,    Thank you for referring your patient, Cara Cool, to the [facility]. Please see a copy of my visit note below.    Cara is an 84 year old female, presenting for the following health issues:  Blood Draw and Oncology Clinic Visit for Diffuse large B cell lymphoma    Subjective    HPI  Oncology History Overview Note   This is an 85 y/o female with a PMH of pAF and saddle PE 2021 on Xarelto, HLD, HTN, TIA, OA, autoimmune hepatitis (on azathioprine), IgM/IgG kappa MGUS (dx 11/2017, follows with Dr. 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     Patient presented to the ED 7/17/2024 with severe and progressive mid-back pain x1 week without recent falls or injuries. Initial imaging revealed lytic lesion of left T12 transverse process and T11 fracture. She was admitted for pain control and further work-up. Additional imaging (CT aortic survey, MRI thoracolumbar, bone scan) had evidence of widespread bony metastasis on thoracolumbar spine, proximal sacrum, bilateral ischium, ribs, and possibly L femur w/o clear evidence of primary source. She completed an outpatient bone bx 7/30 via neurosurgery.    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 The Ki-67 proliferation index is estimated to be 50-60% of the large cell population.        The patient presents today for a new patient visit. She continues to experience severe pain. For the last several days, it is worst in the left low back/buttock area. It does not radiate, and she has no associated weakness or numbness. She finds some relief with the oxycodone 5  "mg tablets, especially when needing to exert herself or ambulate, but overall she is continuing to experience severe pain. She is managing her bowel movements with Miralax, denies constipation. She is able to ambulate with the use of a cane, no falls. She is able to sleep.    Objective  /55 (BP Location: Right arm, Patient Position: Sitting, Cuff Size: Adult Regular)   Pulse 67   Temp 97.6  F (36.4  C) (Oral)   Resp 17   Ht 1.689 m (5' 6.5\")   Wt 105.9 kg (233 lb 8 oz)   BMI 37.12 kg/m    Physical Exam   GENERAL:  Alert oriented well nourished  HEAD: normocephalic atraumatic, edentulous   SKIN:  no rash, hives, other lesions.  LYMPHATIC: no abnormal lymph nodes palable in cervical, axillary, supraclavicular or inguinal area.  RESP:  No rales or rhonchi, breath sounds bilaterally equal and vesicular  CV:  No tachycardia, S1 S2 normal No murmur.  GI:  Abdomen soft nontender no hepato or splenomegaly  MUSCULOSKELETAL:  No visible joint redness or swelling. No cervical, thoracic, or lumbar midline tenderness, though tenderness with minimal palpation of the left buttock. Able to stand and ambulate, though with pain.  NEURO:  No gross weakness gait normal  PSYCH: pleasant affect    Labs: I personally reviewed complete blood count, differential, renal function test, liver function tests LDH.  No cytopenias, LFTs within normal limits, renal function test within normal limits and LDH is normal as well. Uric acid is elevated to 6.9. Alkaline phosphatase midly elevated.    Pathology:  Vertebra, thoracic, core needle biopsy:  - Diffuse large B cell lymphoma, germinal center subtype (see comment)  - Negative for EBV     Imaging: I personally reviewed PETCT neck/chest/abdomen/pelvis and discussed with the patient.  Major disease burden is in the bones with multiple enhancing lesions..    Assessment and Plan:     1) DLBCL germinal cell subtype  Presents with acute onset severe pain, bx positive for DLBCL germinal cell " subtype. We discussed the pathogenesis and initial prognosis of the diagnosis today, as well as the available treatments. IPI score of 2. Will plan to treat with a curative intent with R-mini CHOP, 6 cycles every 3 weeks. Has completed a recent TTE with EF of 70%. Cytogenetics are pending, though would not . Briefly reviewed PET scan without clear nerve involvement, will await formal read. Should formal read suggest nerve involvement, will  pursue CNS disease screen with LP and MRI. Port placement discussed today includes process and benefits, will plan to give first cycle peripherally and revisit discussion at next visit.  - Regimen dosings, schedule, common toxicities were discussed with the patient.  Common toxicities including but not limited to nausea vomiting diarrhea rashes neuropathy was discussed.  Patient verbalized understanding of the information and all his questions were answered.  Patient consented for chemotherapy.      - Will obtain hepatitis and HIV studies with next lab draw, no obvious risk factors for HIV or hepatitis.  - Revisit port discussion at next visit  - Start 1 cycle of R-mini CHOP in the next 1-2 weeks via peripheral IV  - PET after 2 cycles  - Prednisone 60 mg x 5 days for current pain.  - Will plan to administer bactrim and acyclovir for infection ppx  - Will plan for Neulasta for cytopenias     2) Autoimmune hepatitis  - Hold azathioprine. Patient and her daughter were notified to discuss with rheumatology and hold azathioprime. Chemotherapy will likely suppress immunity enough to control hepatitis.    3) MGUS:   - She has diagnosis of MGUS, but recent bone biopsy is diagnostic of DLBCL. We will monitor MGUS while pursuing treatment for lymphoma.    Lisa Purvis DO  PGY2 Internal Medicine Resident  Hematology/Oncology     Total time spent on date of service in review of medical records, review of labs, history taking, physical exam, discussion of assessment and  plan, counseling and patient education is 90 minutes.    Darya Yu MD  Attending Physician  Pager [phone]        Again, thank you for allowing me to participate in the care of your patient.        Sincerely,        Darya Yu MD

## 2024-08-16 PROBLEM — C83.398 DIFFUSE LARGE B-CELL LYMPHOMA OF EXTRANODAL SITE: Status: ACTIVE | Noted: 2024-08-16

## 2024-08-16 RX ORDER — ALBUTEROL SULFATE 90 UG/1
1-2 AEROSOL, METERED RESPIRATORY (INHALATION)
Status: CANCELLED
Start: 2024-08-28

## 2024-08-16 RX ORDER — PALONOSETRON 0.05 MG/ML
0.25 INJECTION, SOLUTION INTRAVENOUS ONCE
Status: CANCELLED
Start: 2024-08-28

## 2024-08-16 RX ORDER — DIPHENHYDRAMINE HCL 25 MG
50 CAPSULE ORAL ONCE
Status: CANCELLED
Start: 2024-08-28

## 2024-08-16 RX ORDER — ALBUTEROL SULFATE 0.83 MG/ML
2.5 SOLUTION RESPIRATORY (INHALATION)
Status: CANCELLED | OUTPATIENT
Start: 2024-08-28

## 2024-08-16 RX ORDER — METHYLPREDNISOLONE SODIUM SUCCINATE 125 MG/2ML
125 INJECTION, POWDER, LYOPHILIZED, FOR SOLUTION INTRAMUSCULAR; INTRAVENOUS
Status: CANCELLED
Start: 2024-08-28

## 2024-08-16 RX ORDER — HEPARIN SODIUM (PORCINE) LOCK FLUSH IV SOLN 100 UNIT/ML 100 UNIT/ML
5 SOLUTION INTRAVENOUS
Status: CANCELLED | OUTPATIENT
Start: 2024-08-28

## 2024-08-16 RX ORDER — DOXORUBICIN HYDROCHLORIDE 2 MG/ML
25 INJECTION, SOLUTION INTRAVENOUS ONCE
Status: CANCELLED | OUTPATIENT
Start: 2024-08-28

## 2024-08-16 RX ORDER — MEPERIDINE HYDROCHLORIDE 25 MG/ML
25 INJECTION INTRAMUSCULAR; INTRAVENOUS; SUBCUTANEOUS
Status: CANCELLED
Start: 2024-08-28

## 2024-08-16 RX ORDER — EPINEPHRINE 1 MG/ML
0.3 INJECTION, SOLUTION INTRAMUSCULAR; SUBCUTANEOUS EVERY 5 MIN PRN
Status: CANCELLED | OUTPATIENT
Start: 2024-08-28

## 2024-08-16 RX ORDER — LORAZEPAM 2 MG/ML
0.5 INJECTION INTRAMUSCULAR EVERY 4 HOURS PRN
Status: CANCELLED | OUTPATIENT
Start: 2024-08-28

## 2024-08-16 RX ORDER — ACETAMINOPHEN 325 MG/1
650 TABLET ORAL ONCE
Status: CANCELLED
Start: 2024-08-28

## 2024-08-16 RX ORDER — HEPARIN SODIUM,PORCINE 10 UNIT/ML
5-20 VIAL (ML) INTRAVENOUS DAILY PRN
Status: CANCELLED | OUTPATIENT
Start: 2024-08-28

## 2024-08-16 RX ORDER — DIPHENHYDRAMINE HYDROCHLORIDE 50 MG/ML
50 INJECTION INTRAMUSCULAR; INTRAVENOUS
Status: CANCELLED
Start: 2024-08-28

## 2024-08-16 RX ORDER — MEPERIDINE HYDROCHLORIDE 25 MG/ML
25 INJECTION INTRAMUSCULAR; INTRAVENOUS; SUBCUTANEOUS EVERY 30 MIN PRN
Status: CANCELLED | OUTPATIENT
Start: 2024-08-28

## 2024-08-21 RX ORDER — ALBUTEROL SULFATE 0.83 MG/ML
2.5 SOLUTION RESPIRATORY (INHALATION)
Status: CANCELLED | OUTPATIENT
Start: 2024-09-02

## 2024-08-21 RX ORDER — METHYLPREDNISOLONE SODIUM SUCCINATE 125 MG/2ML
125 INJECTION, POWDER, LYOPHILIZED, FOR SOLUTION INTRAMUSCULAR; INTRAVENOUS
Status: CANCELLED
Start: 2024-09-02

## 2024-08-21 RX ORDER — ALBUTEROL SULFATE 90 UG/1
1-2 AEROSOL, METERED RESPIRATORY (INHALATION)
Status: CANCELLED
Start: 2024-09-02

## 2024-08-21 RX ORDER — DIPHENHYDRAMINE HYDROCHLORIDE 50 MG/ML
50 INJECTION INTRAMUSCULAR; INTRAVENOUS
Status: CANCELLED
Start: 2024-09-02

## 2024-08-21 RX ORDER — MEPERIDINE HYDROCHLORIDE 25 MG/ML
25 INJECTION INTRAMUSCULAR; INTRAVENOUS; SUBCUTANEOUS EVERY 30 MIN PRN
Status: CANCELLED | OUTPATIENT
Start: 2024-09-02

## 2024-08-21 RX ORDER — EPINEPHRINE 1 MG/ML
0.3 INJECTION, SOLUTION INTRAMUSCULAR; SUBCUTANEOUS EVERY 5 MIN PRN
Status: CANCELLED | OUTPATIENT
Start: 2024-09-02

## 2024-08-21 RX ORDER — PENTAMIDINE ISETHIONATE 300 MG/300MG
300 INHALANT RESPIRATORY (INHALATION) ONCE
Status: CANCELLED
Start: 2024-09-02 | End: 2024-09-02

## 2024-08-21 RX ORDER — ALBUTEROL SULFATE 0.83 MG/ML
2.5 SOLUTION RESPIRATORY (INHALATION) ONCE
Status: CANCELLED
Start: 2024-09-02 | End: 2024-09-02

## 2024-08-22 DIAGNOSIS — Z51.11 ENCOUNTER FOR ANTINEOPLASTIC CHEMOTHERAPY: Primary | ICD-10-CM

## 2024-08-26 ENCOUNTER — PATIENT OUTREACH (OUTPATIENT)
Dept: ONCOLOGY | Facility: CLINIC | Age: 84
End: 2024-08-26

## 2024-08-26 ENCOUNTER — ONCOLOGY VISIT (OUTPATIENT)
Dept: ONCOLOGY | Facility: CLINIC | Age: 84
End: 2024-08-26
Attending: STUDENT IN AN ORGANIZED HEALTH CARE EDUCATION/TRAINING PROGRAM
Payer: COMMERCIAL

## 2024-08-26 VITALS
TEMPERATURE: 98.1 F | RESPIRATION RATE: 16 BRPM | WEIGHT: 235.4 LBS | OXYGEN SATURATION: 96 % | DIASTOLIC BLOOD PRESSURE: 86 MMHG | SYSTOLIC BLOOD PRESSURE: 159 MMHG | BODY MASS INDEX: 37.43 KG/M2 | HEART RATE: 70 BPM

## 2024-08-26 DIAGNOSIS — C83.398 DIFFUSE LARGE B-CELL LYMPHOMA OF EXTRANODAL SITE: Primary | ICD-10-CM

## 2024-08-26 DIAGNOSIS — Z51.11 ENCOUNTER FOR ANTINEOPLASTIC CHEMOTHERAPY: ICD-10-CM

## 2024-08-26 PROCEDURE — G0463 HOSPITAL OUTPT CLINIC VISIT: HCPCS | Performed by: STUDENT IN AN ORGANIZED HEALTH CARE EDUCATION/TRAINING PROGRAM

## 2024-08-26 PROCEDURE — 99214 OFFICE O/P EST MOD 30 MIN: CPT | Performed by: STUDENT IN AN ORGANIZED HEALTH CARE EDUCATION/TRAINING PROGRAM

## 2024-08-26 ASSESSMENT — PAIN SCALES - GENERAL: PAINLEVEL: NO PAIN (0)

## 2024-08-26 NOTE — NURSING NOTE
"Oncology Rooming Note    August 26, 2024 2:39 PM   Cara Cool is a 84 year old female who presents for:    Chief Complaint   Patient presents with    Oncology Clinic Visit     Lytic bone lesions on xray     Initial Vitals: BP (!) 159/86 (BP Location: Right arm, Patient Position: Sitting, Cuff Size: Adult Regular)   Pulse 70   Temp 98.1  F (36.7  C) (Oral)   Resp 16   Wt 106.8 kg (235 lb 6.4 oz)   SpO2 96%   BMI 37.43 kg/m   Estimated body mass index is 37.43 kg/m  as calculated from the following:    Height as of 8/15/24: 1.689 m (5' 6.5\").    Weight as of this encounter: 106.8 kg (235 lb 6.4 oz). Body surface area is 2.24 meters squared.  No Pain (0) Comment: Data Unavailable   No LMP recorded. Patient is postmenopausal.  Allergies reviewed: Yes  Medications reviewed: Yes    Medications: MEDICATION REFILLS NEEDED TODAY. Provider was notified.  Pharmacy name entered into [employer]: [facility] #[id number] [place], [place] - [address]    Frailty Screening:   Is the patient here for a new oncology consult visit in cancer care? 2. No      Clinical concerns: Pt needs refill on Metoprolol. Pt reports no new concerns. Dr. Yu was notified via message.       Sanjana Haynes, EMT     "

## 2024-08-26 NOTE — LETTER
8/26/2024      Cara Cool  [address] [place] [id number]      Dear Colleague,    Thank you for referring your patient, Cara Cool, to the [facility]. Please see a copy of my visit note below.      Subjective  Cara is a 84 year old, presenting for the following health issues:  Oncology Clinic Visit (Lytic bone lesions on xray)    HPI     Oncology History Overview Note   This is an 85 y/o female with a PMH of pAF and saddle PE 2021 on Xarelto, HLD, HTN, TIA, OA, autoimmune hepatitis (on azathioprine), IgM/IgG kappa MGUS (dx 11/2017, follows with Dr. Partha Sherman at [facility]), papillary transitional renal cell carcinoma (s/p cryoablation 6/7/2018), hyperparathyroidism s/p adenoma removal 12/2019, coming in for bx positive DLBCL germinal cell subtype with lytic lesions.     Patient presented to the ED 7/17/2024 with severe and progressive mid-back pain x1 week without recent falls or injuries. Initial imaging revealed lytic lesion of left T12 transverse process and T11 fracture. She was admitted for pain control and further work-up. Additional imaging (CT aortic survey, MRI thoracolumbar, bone scan) had evidence of widespread bony metastasis on thoracolumbar spine, proximal sacrum, bilateral ischium, ribs, and possibly L femur w/o clear evidence of primary source. She completed an outpatient bone bx 7/30 via neurosurgery.    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 The Ki-67 proliferation index is estimated to be 50-60% .  Echo shows preserved EF of 60 to 65%.  HIV hepatitis B negative.  FISH is pending for Bcl-2 and MYC rearrangement.  PET scan showsNumerous scattered hypermetabolic lesions throughout axial skeleton largest on most FDG avid is T11.  There is current extension into spinal canal and 2 neural foramina for T10/T11 and T11/T12.  There is a  hypermetabolic lesion on the liver that might be related to lymphoma.  Numerous hypermetabolic lesions in appendicular skeleton including the left humeral head and right mid femur.  Hypermetabolic activity in the short segment narrowing of sigmoid colon.  This scan was discussed with radiologist.  Due to the fact that PET scan is with CT without contrast, reliable assessment of spinal nerve roots was not done and MRI was recommended.  Patient did not have any weakness or neuropathy or sciatica concerning for nerve root involvement.  Patient is quite symptomatic from her disease in terms of bone pains and require treatment in time sensitive manner.  Considering patient's age I do not think patient can tolerate concurrent M R-CHOP.  Majority of patient's disease burden is symptomatic and external compression to nerve root and dural involvement can also be treated by R-CHOP.  So I elected to proceed with R mini CHOP therapy.       Diffuse large B-cell lymphoma of extranodal site (H)   8/16/2024 Initial Diagnosis    Diffuse large B-cell lymphoma of extranodal site (H)     8/19/2024 -  Chemotherapy    OP ONC Non-Hodgkin's Lymphoma - R-CHOP  Plan Provider: Darya Yu MD  Treatment goal: Curative  Line of treatment: First Line       Patient comes today for follow up. No fevers, chills, night sweats or weight loss, no lymphadenopathy.  She is very happy that her pain is resolved after starting steroids.  She can now even walk few steps without her cane.        Objective   BP (!) 159/86 (BP Location: Right arm, Patient Position: Sitting, Cuff Size: Adult Regular)   Pulse 70   Temp 98.1  F (36.7  C) (Oral)   Resp 16   Wt 106.8 kg (235 lb 6.4 oz)   SpO2 96%   BMI 37.43 kg/m    Body mass index is 37.43 kg/m .  Physical Exam   GENERAL:  Alert oriented well nourished  HEAD: normocephalic atraumatic  SKIN:  no rash, hives, other lesions.  LYMPHATIC: no abnormal lymph nodes palable in cervical, axillary, supraclavicular  or inguinal area.  RESP:  No rales or rhonchi, breath sounds bilaterally equal and vesicular  CV:  No tachycardia, S1 S2 normal No murmur.  GI:  Abdomen soft nontender no hepato or splenomegaly  MUSCULOSKELETAL:  No visible joint redness or swelling.  NEURO:  No gross weakness gait normal  PSYCH: pleasant affect    Labs: I personally reviewed complete blood count, differential, renal function test, liver function tests LDH.  No cytopenias, LFTs within normal limits, renal function test within normal limits and LDH is normal as well.    Assessment and Plan:    1) diffuse large B-cell lymphoma: IPI 3 stage IV  -I discussed once again regimen dosings, schedule, common toxicities with the patient.  Common toxicities including but not limited to nausea vomiting diarrhea rashes neuropathy was discussed.  Patient verbalized understanding of the information and all his questions were answered.  Patient consented for chemotherapy.  Chemotherapy will be started 8/28/2024.  -Patient informed to take acyclovir, allopurinol and monthly pentamadine infusions as prescribed.  Steroids and antiemetics as prescribed.  All of the medications will go to patient's home delivery.  -R mini CHOP every 3 weeks for 6 cycles.  PET scan after 2 cycles.    2) autoimmune hepatitis B:  -Azathioprine should be discontinued.  Chemotherapy should provide immunosuppression that will help hepatitis and prednisone will help to.  I advised patient and her daughter to discuss this with hepatologist.    Total time spent on date of service in review of medical records, review of labs, history taking, physical exam, discussion of assessment and plan, counseling and patient education is 30 minutes.    Darya Yu MD  Attending Physician  Pager [phone]            Signed Electronically by: Darya Yu MD        Again, thank you for allowing me to participate in the care of your patient.        Sincerely,        Darya Yu MD

## 2024-08-26 NOTE — PROGRESS NOTES
"[facility]: Cancer Care Initial Note                                    Discussion with Patient:                                                      Met with Cara and family after office visit with Dr. Yu. Introduced self as RN Care Coordinator. Provided [facility] Guidebook folder and discussed included materials with patient.  Distributed business cards and contact information for [facility]. Discussed roles of RNCC, MD, LUCERO, nurse triage line, and clinic coordinators. Discussed how to get in contact with different team members via [employer] for non emergency questions and at [phone], which has options to talk with a Nurse available 24/7.     Provided overview of supportive services at [facility] including SW, Cancer Rehab, Cancer Survivorship, oncology dietitian, and oncology behavioral health providers (psychology, psychiatry, counseling).    Discussed chemo education and what to expect at infusion appointments. Provided printed literature on R-CHOP. Reviewed pertinent sections of [facility] Cancer Care Guidebook, including \"Getting Ready for Chemotherapy\". Reviewed possible side effects, when to contact your doctor or health care provider, and self care tips sections. Reviewed treatment schedule including cycle length, lab monitoring, and prn medications.    Auth to Discuss PHI form completed- form placed in scanning.      Patient voiced understanding and appreciation of above information and denies any further questions.        Goals          General     Other (pt-stated)      Notes - Note created  8/26/2024  4:52 PM by Lauren Leonard RN     Goal Statement: I will use my clinic and care team resources as directed.  Date Goal set: 8/26/2024  Barriers: disease burden  Strengths: support, coping, motivation, health awareness, and involvement with care team  Date to Achieve By: ongoing  Patient expressed understanding of goal: Yes  Action steps to achieve this goal:  I will contact " triage with new, worsening or uncontrolled symptoms.   I will contact triage with temperature over 100.4  I will call with difficulties of scheduling and/or transportation.   I will request needed refills when there are 7 days of medication remaining.   I will not send urgent or symptomatic messages through [employer].   I will contact scheduling to arrange or make changes in my appointments.                Assessment:                                                      Initial  Informal Support system:: Family  Equipment Currently Used at Home: cane, straight  Bed or wheelchair confined:: No  Mobility Status: Independent  Transportation means:: Family;Regular car  Referrals Placed: None    Plan of Care Education Review:   Assessment completed with:: Patient;Family    Plan of Care Education   Yearly learning assessment completed?: Yes (see Education tab)  Diagnosis:: DLBCL  Does patient understand diagnosis?: Yes  Tx plan/regimen:: R-CHOP  Does patient understand treatment plan/regimen?: Yes  Preparing for treatment:: Reviewed treatment preparation information with patient (vascular access, day of chemo, visitor policy, what to bring, etc.)  Vascular access education provided for:: Peripheral IV  Side effect education:: Diarrhea/Constipation;Fatigue;Hair loss;Immune-mediated effects;Infection;Lab value monitoring (anemia, neutropenia, thrombocytopenia);Mouth sores;Mylosuppression;Nausea/Vomiting;Neuropathy  Plan of Care:: Treatment schedule  When to call provider:: Bleeding;Increased shortness of breath;New/worsening pain;Shaking chills;Temperature >100.4F;Uncontrolled diarrhea/constipation;Uncontrolled nausea/vomiting    Evaluation of Learning  Patient Education Provided: Yes  Readiness:: Eager;Acceptance  Method:: Booklet/Handout;Literature;Explanation  Response:: Verbalizes understanding           Intervention/Education provided during outreach:                                                       Further POC:      - R-CHOP on 8/28/24     Patient verbalizes understanding and has no questions or concerns at this time. Has contact information for [facility] if they have any further needs.    Lauren Leonard, BSN, RN  RN Care Coordinator   [phone]

## 2024-08-26 NOTE — PROGRESS NOTES
Meenu Marroquin is a 84 year old, presenting for the following health issues:  Oncology Clinic Visit (Lytic bone lesions on xray)    HPI     Oncology History Overview Note   This is an 83 y/o female with a PMH of pAF and saddle PE 2021 on Xarelto, HLD, HTN, TIA, OA, autoimmune hepatitis (on azathioprine), IgM/IgG kappa MGUS (dx 11/2017, follows with Dr. 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     Patient presented to the ED 7/17/2024 with severe and progressive mid-back pain x1 week without recent falls or injuries. Initial imaging revealed lytic lesion of left T12 transverse process and T11 fracture. She was admitted for pain control and further work-up. Additional imaging (CT aortic survey, MRI thoracolumbar, bone scan) had evidence of widespread bony metastasis on thoracolumbar spine, proximal sacrum, bilateral ischium, ribs, and possibly L femur w/o clear evidence of primary source. She completed an outpatient bone bx 7/30 via neurosurgery.    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 The Ki-67 proliferation index is estimated to be 50-60% .  Echo shows preserved EF of 60 to 65%.  HIV hepatitis B negative.  FISH is pending for Bcl-2 and MYC rearrangement.  PET scan showsNumerous scattered hypermetabolic lesions throughout axial skeleton largest on most FDG avid is T11.  There is current extension into spinal canal and 2 neural foramina for T10/T11 and T11/T12.  There is a hypermetabolic lesion on the liver that might be related to lymphoma.  Numerous hypermetabolic lesions in appendicular skeleton including the left humeral head and right mid femur.  Hypermetabolic activity in the short segment narrowing of  sigmoid colon.  This scan was discussed with radiologist.  Due to the fact that PET scan is with CT without contrast, reliable assessment of spinal nerve roots was not done and MRI was recommended.  Patient did not have any weakness or neuropathy or sciatica concerning for nerve root involvement.  Patient is quite symptomatic from her disease in terms of bone pains and require treatment in time sensitive manner.  Considering patient's age I do not think patient can tolerate concurrent M R-CHOP.  Majority of patient's disease burden is symptomatic and external compression to nerve root and dural involvement can also be treated by R-CHOP.  So I elected to proceed with R mini CHOP therapy.       Diffuse large B-cell lymphoma of extranodal site (H)   8/16/2024 Initial Diagnosis    Diffuse large B-cell lymphoma of extranodal site (H)     8/19/2024 -  Chemotherapy    OP ONC Non-Hodgkin's Lymphoma - R-CHOP  Plan Provider: Darya Yu MD  Treatment goal: Curative  Line of treatment: First Line       Patient comes today for follow up. No fevers, chills, night sweats or weight loss, no lymphadenopathy.  She is very happy that her pain is resolved after starting steroids.  She can now even walk few steps without her cane.        Objective    BP (!) 159/86 (BP Location: Right arm, Patient Position: Sitting, Cuff Size: Adult Regular)   Pulse 70   Temp 98.1  F (36.7  C) (Oral)   Resp 16   Wt 106.8 kg (235 lb 6.4 oz)   SpO2 96%   BMI 37.43 kg/m    Body mass index is 37.43 kg/m .  Physical Exam   GENERAL:  Alert oriented well nourished  HEAD: normocephalic atraumatic  SKIN:  no rash, hives, other lesions.  LYMPHATIC: no abnormal lymph nodes palable in cervical, axillary, supraclavicular or inguinal area.  RESP:  No rales or rhonchi, breath sounds bilaterally equal and vesicular  CV:  No tachycardia, S1 S2 normal No murmur.  GI:  Abdomen soft nontender no hepato or splenomegaly  MUSCULOSKELETAL:  No visible joint redness  or swelling.  NEURO:  No gross weakness gait normal  PSYCH: pleasant affect    Labs: I personally reviewed complete blood count, differential, renal function test, liver function tests LDH.  No cytopenias, LFTs within normal limits, renal function test within normal limits and LDH is normal as well.    Assessment and Plan:    1) diffuse large B-cell lymphoma: IPI 3 stage IV  -I discussed once again regimen dosings, schedule, common toxicities with the patient.  Common toxicities including but not limited to nausea vomiting diarrhea rashes neuropathy was discussed.  Patient verbalized understanding of the information and all his questions were answered.  Patient consented for chemotherapy.  Chemotherapy will be started 8/28/2024.  -Patient informed to take acyclovir, allopurinol and monthly pentamadine infusions as prescribed.  Steroids and antiemetics as prescribed.  All of the medications will go to patient's home delivery.  -R mini CHOP every 3 weeks for 6 cycles.  PET scan after 2 cycles.    2) autoimmune hepatitis B:  -Azathioprine should be discontinued.  Chemotherapy should provide immunosuppression that will help hepatitis and prednisone will help to.  I advised patient and her daughter to discuss this with hepatologist.    Total time spent on date of service in review of medical records, review of labs, history taking, physical exam, discussion of assessment and plan, counseling and patient education is 30 minutes.    Darya Yu MD  Attending Physician  Pager [phone]            Signed Electronically by: Darya Yu MD

## 2024-08-27 LAB — INTERPRETATION: NORMAL

## 2024-08-27 RX ORDER — PREDNISONE 50 MG/1
50 TABLET ORAL DAILY
Qty: 10 TABLET | Refills: 7 | Status: SHIPPED | OUTPATIENT
Start: 2024-08-27 | End: 2024-09-04

## 2024-08-27 RX ORDER — ACYCLOVIR 400 MG/1
400 TABLET ORAL EVERY 12 HOURS
Qty: 60 TABLET | Refills: 11 | Status: SHIPPED | OUTPATIENT
Start: 2024-08-27 | End: 2024-10-07

## 2024-08-27 RX ORDER — ONDANSETRON 8 MG/1
8 TABLET, FILM COATED ORAL EVERY 8 HOURS PRN
Qty: 30 TABLET | Refills: 2 | Status: SHIPPED | OUTPATIENT
Start: 2024-08-27

## 2024-08-27 RX ORDER — PROCHLORPERAZINE MALEATE 10 MG
10 TABLET ORAL EVERY 6 HOURS PRN
Qty: 30 TABLET | Refills: 2 | Status: SHIPPED | OUTPATIENT
Start: 2024-08-27

## 2024-08-27 RX ORDER — ALLOPURINOL 300 MG/1
300 TABLET ORAL DAILY
Qty: 14 TABLET | Refills: 3 | Status: SHIPPED | OUTPATIENT
Start: 2024-08-27 | End: 2024-09-10

## 2024-08-28 ENCOUNTER — APPOINTMENT (OUTPATIENT)
Dept: LAB | Facility: CLINIC | Age: 84
End: 2024-08-28
Attending: STUDENT IN AN ORGANIZED HEALTH CARE EDUCATION/TRAINING PROGRAM
Payer: COMMERCIAL

## 2024-08-28 ENCOUNTER — INFUSION THERAPY VISIT (OUTPATIENT)
Dept: ONCOLOGY | Facility: CLINIC | Age: 84
End: 2024-08-28
Attending: STUDENT IN AN ORGANIZED HEALTH CARE EDUCATION/TRAINING PROGRAM
Payer: COMMERCIAL

## 2024-08-28 VITALS
OXYGEN SATURATION: 96 % | BODY MASS INDEX: 38.65 KG/M2 | HEART RATE: 66 BPM | DIASTOLIC BLOOD PRESSURE: 84 MMHG | HEIGHT: 65 IN | TEMPERATURE: 98 F | RESPIRATION RATE: 16 BRPM | SYSTOLIC BLOOD PRESSURE: 139 MMHG | WEIGHT: 232 LBS

## 2024-08-28 DIAGNOSIS — C83.398 DIFFUSE LARGE B-CELL LYMPHOMA OF EXTRANODAL SITE: Primary | ICD-10-CM

## 2024-08-28 DIAGNOSIS — Z51.11 ENCOUNTER FOR ANTINEOPLASTIC CHEMOTHERAPY: ICD-10-CM

## 2024-08-28 LAB
ALBUMIN SERPL BCG-MCNC: 3.9 G/DL (ref 3.5–5.2)
ALP SERPL-CCNC: 237 U/L (ref 40–150)
ALT SERPL W P-5'-P-CCNC: 9 U/L (ref 0–50)
ANION GAP SERPL CALCULATED.3IONS-SCNC: 11 MMOL/L (ref 7–15)
AST SERPL W P-5'-P-CCNC: 22 U/L (ref 0–45)
BASOPHILS # BLD AUTO: 0 10E3/UL (ref 0–0.2)
BASOPHILS NFR BLD AUTO: 0 %
BILIRUB SERPL-MCNC: 0.9 MG/DL
BUN SERPL-MCNC: 23.2 MG/DL (ref 8–23)
CALCIUM SERPL-MCNC: 9.5 MG/DL (ref 8.8–10.4)
CHLORIDE SERPL-SCNC: 106 MMOL/L (ref 98–107)
CREAT SERPL-MCNC: 0.76 MG/DL (ref 0.51–0.95)
EGFRCR SERPLBLD CKD-EPI 2021: 78 ML/MIN/1.73M2
EOSINOPHIL # BLD AUTO: 0 10E3/UL (ref 0–0.7)
EOSINOPHIL NFR BLD AUTO: 0 %
ERYTHROCYTE [DISTWIDTH] IN BLOOD BY AUTOMATED COUNT: 16.7 % (ref 10–15)
GLUCOSE SERPL-MCNC: 123 MG/DL (ref 70–99)
HBV CORE AB SERPL QL IA: NONREACTIVE
HBV SURFACE AB SERPL IA-ACNC: <3.5 M[IU]/ML
HBV SURFACE AB SERPL IA-ACNC: NONREACTIVE M[IU]/ML
HBV SURFACE AG SERPL QL IA: NONREACTIVE
HCO3 SERPL-SCNC: 22 MMOL/L (ref 22–29)
HCT VFR BLD AUTO: 40.1 % (ref 35–47)
HGB BLD-MCNC: 13.1 G/DL (ref 11.7–15.7)
IMM GRANULOCYTES # BLD: 0.1 10E3/UL
IMM GRANULOCYTES NFR BLD: 1 %
LYMPHOCYTES # BLD AUTO: 0.6 10E3/UL (ref 0.8–5.3)
LYMPHOCYTES NFR BLD AUTO: 8 %
MCH RBC QN AUTO: 29.2 PG (ref 26.5–33)
MCHC RBC AUTO-ENTMCNC: 32.7 G/DL (ref 31.5–36.5)
MCV RBC AUTO: 90 FL (ref 78–100)
MONOCYTES # BLD AUTO: 0.2 10E3/UL (ref 0–1.3)
MONOCYTES NFR BLD AUTO: 2 %
NEUTROPHILS # BLD AUTO: 6.3 10E3/UL (ref 1.6–8.3)
NEUTROPHILS NFR BLD AUTO: 89 %
NRBC # BLD AUTO: 0.1 10E3/UL
NRBC BLD AUTO-RTO: 1 /100
PLATELET # BLD AUTO: 482 10E3/UL (ref 150–450)
POTASSIUM SERPL-SCNC: 4.7 MMOL/L (ref 3.4–5.3)
PROT SERPL-MCNC: 7.4 G/DL (ref 6.4–8.3)
RBC # BLD AUTO: 4.48 10E6/UL (ref 3.8–5.2)
SODIUM SERPL-SCNC: 139 MMOL/L (ref 135–145)
WBC # BLD AUTO: 7.1 10E3/UL (ref 4–11)

## 2024-08-28 PROCEDURE — 96411 CHEMO IV PUSH ADDL DRUG: CPT

## 2024-08-28 PROCEDURE — 250N000013 HC RX MED GY IP 250 OP 250 PS 637: Performed by: STUDENT IN AN ORGANIZED HEALTH CARE EDUCATION/TRAINING PROGRAM

## 2024-08-28 PROCEDURE — 96367 TX/PROPH/DG ADDL SEQ IV INF: CPT

## 2024-08-28 PROCEDURE — 86706 HEP B SURFACE ANTIBODY: CPT | Performed by: STUDENT IN AN ORGANIZED HEALTH CARE EDUCATION/TRAINING PROGRAM

## 2024-08-28 PROCEDURE — 96372 THER/PROPH/DIAG INJ SC/IM: CPT | Performed by: STUDENT IN AN ORGANIZED HEALTH CARE EDUCATION/TRAINING PROGRAM

## 2024-08-28 PROCEDURE — 258N000003 HC RX IP 258 OP 636: Performed by: STUDENT IN AN ORGANIZED HEALTH CARE EDUCATION/TRAINING PROGRAM

## 2024-08-28 PROCEDURE — 36415 COLL VENOUS BLD VENIPUNCTURE: CPT | Performed by: STUDENT IN AN ORGANIZED HEALTH CARE EDUCATION/TRAINING PROGRAM

## 2024-08-28 PROCEDURE — 250N000011 HC RX IP 250 OP 636: Mod: JW | Performed by: STUDENT IN AN ORGANIZED HEALTH CARE EDUCATION/TRAINING PROGRAM

## 2024-08-28 PROCEDURE — 96417 CHEMO IV INFUS EACH ADDL SEQ: CPT

## 2024-08-28 PROCEDURE — 96377 APPLICATON ON-BODY INJECTOR: CPT | Mod: 59

## 2024-08-28 PROCEDURE — 80053 COMPREHEN METABOLIC PANEL: CPT | Performed by: STUDENT IN AN ORGANIZED HEALTH CARE EDUCATION/TRAINING PROGRAM

## 2024-08-28 PROCEDURE — 86704 HEP B CORE ANTIBODY TOTAL: CPT | Performed by: STUDENT IN AN ORGANIZED HEALTH CARE EDUCATION/TRAINING PROGRAM

## 2024-08-28 PROCEDURE — 85025 COMPLETE CBC W/AUTO DIFF WBC: CPT | Performed by: STUDENT IN AN ORGANIZED HEALTH CARE EDUCATION/TRAINING PROGRAM

## 2024-08-28 PROCEDURE — 96415 CHEMO IV INFUSION ADDL HR: CPT

## 2024-08-28 PROCEDURE — 87340 HEPATITIS B SURFACE AG IA: CPT | Performed by: STUDENT IN AN ORGANIZED HEALTH CARE EDUCATION/TRAINING PROGRAM

## 2024-08-28 PROCEDURE — 96375 TX/PRO/DX INJ NEW DRUG ADDON: CPT

## 2024-08-28 PROCEDURE — 96413 CHEMO IV INFUSION 1 HR: CPT

## 2024-08-28 RX ORDER — ACETAMINOPHEN 325 MG/1
650 TABLET ORAL ONCE
Status: COMPLETED | OUTPATIENT
Start: 2024-08-28 | End: 2024-08-28

## 2024-08-28 RX ORDER — DIPHENHYDRAMINE HCL 25 MG
50 CAPSULE ORAL ONCE
Status: COMPLETED | OUTPATIENT
Start: 2024-08-28 | End: 2024-08-28

## 2024-08-28 RX ORDER — PALONOSETRON 0.05 MG/ML
0.25 INJECTION, SOLUTION INTRAVENOUS ONCE
Status: COMPLETED | OUTPATIENT
Start: 2024-08-28 | End: 2024-08-28

## 2024-08-28 RX ORDER — DOXORUBICIN HYDROCHLORIDE 2 MG/ML
25 INJECTION, SOLUTION INTRAVENOUS ONCE
Status: COMPLETED | OUTPATIENT
Start: 2024-08-28 | End: 2024-08-28

## 2024-08-28 RX ADMIN — DOXORUBICIN HYDROCHLORIDE 60 MG: 2 INJECTION, SOLUTION INTRAVENOUS at 12:30

## 2024-08-28 RX ADMIN — PALONOSETRON HYDROCHLORIDE 0.25 MG: 0.25 INJECTION INTRAVENOUS at 11:52

## 2024-08-28 RX ADMIN — PEGFILGRASTIM 6 MG: KIT SUBCUTANEOUS at 15:01

## 2024-08-28 RX ADMIN — VINCRISTINE SULFATE 1 MG: 1 INJECTION, SOLUTION INTRAVENOUS at 12:39

## 2024-08-28 RX ADMIN — FOSAPREPITANT 150 MG: 150 INJECTION, POWDER, LYOPHILIZED, FOR SOLUTION INTRAVENOUS at 11:57

## 2024-08-28 RX ADMIN — DIPHENHYDRAMINE HYDROCHLORIDE 50 MG: 25 CAPSULE ORAL at 12:45

## 2024-08-28 RX ADMIN — CYCLOPHOSPHAMIDE 890 MG: 1 INJECTION, POWDER, FOR SOLUTION INTRAVENOUS; ORAL at 12:44

## 2024-08-28 RX ADMIN — RITUXIMAB-ABBS 800 MG: 10 INJECTION, SOLUTION INTRAVENOUS at 13:21

## 2024-08-28 RX ADMIN — SODIUM CHLORIDE 250 ML: 9 INJECTION, SOLUTION INTRAVENOUS at 11:50

## 2024-08-28 RX ADMIN — ACETAMINOPHEN 650 MG: 325 TABLET ORAL at 12:45

## 2024-08-28 ASSESSMENT — PAIN SCALES - GENERAL: PAINLEVEL: NO PAIN (0)

## 2024-08-28 NOTE — NURSING NOTE
Chief Complaint   Patient presents with    Blood Draw     Labs drawn via PIV; weight and vs obtained     PIV using ultrasound; labs collected. Weight and VS obtained. Patient checked in for next appointment.    Silvia Antoine RN

## 2024-08-28 NOTE — PATIENT INSTRUCTIONS
You were given Aloxi, a long-acting nausea medication in the same class as Zofran, around 12pm today. Please refrain from taking Zofran until after 12pm on Saturday. In the meantime, if you can Compazine if needed for nausea relief.    Neulasta On-Body device: It will start to release medication into your body 6pm tomorrow evening. You can take off by 6:45pm/7pm.    Contact Numbers    [facility] [place] Main Line (for Scheduling/Triage/After Hours Nurse Line): [phone]    Please call the [facility] nurse triage or the after hours nurse line if you experience a temperature greater than or equal to 100.4, shaking chills, have uncontrolled nausea, vomiting and/or diarrhea, dizziness, lightheadedness, shortness of breath, chest pain, bleeding, unexplained bruising, or if you have any other new/concerning symptoms, questions or concerns.     If you are having any concerning symptoms or wish to speak to a provider before your next infusion visit, please call your care coordinator or triage to notify them so we can adequately serve you.     If you need any refills on medications (narcotics or other medications), please call before your infusion appointment.      Lab Results:  Recent Results (from the past 12 hour(s))   Comprehensive metabolic panel    Collection Time: 08/28/24 10:51 AM   Result Value Ref Range    Sodium 139 135 - 145 mmol/L    Potassium 4.7 3.4 - 5.3 mmol/L    Carbon Dioxide (CO2) 22 22 - 29 mmol/L    Anion Gap 11 7 - 15 mmol/L    Urea Nitrogen 23.2 (H) 8.0 - 23.0 mg/dL    Creatinine 0.76 0.51 - 0.95 mg/dL    GFR Estimate 78 >60 mL/min/1.73m2    Calcium 9.5 8.8 - 10.4 mg/dL    Chloride 106 98 - 107 mmol/L    Glucose 123 (H) 70 - 99 mg/dL    Alkaline Phosphatase 237 (H) 40 - 150 U/L    AST 22 0 - 45 U/L    ALT 9 0 - 50 U/L    Protein Total 7.4 6.4 - 8.3 g/dL    Albumin 3.9 3.5 - 5.2 g/dL    Bilirubin Total 0.9 <=1.2 mg/dL   Hepatitis B core antibody    Collection Time: 08/28/24 10:51 AM   Result Value Ref Range     Hepatitis B Core Antibody Total Nonreactive Nonreactive   Hepatitis B surface antigen    Collection Time: 08/28/24 10:51 AM   Result Value Ref Range    Hepatitis B Surface Antigen Nonreactive Nonreactive   Hepatitis B Surface Antibody    Collection Time: 08/28/24 10:51 AM   Result Value Ref Range    Hepatitis B Surface Antibody Nonreactive     Hepatitis B Surface Antibody Instrument Value <3.50 <8.5 m[IU]/mL   CBC with platelets and differential    Collection Time: 08/28/24 10:51 AM   Result Value Ref Range    WBC Count 7.1 4.0 - 11.0 10e3/uL    RBC Count 4.48 3.80 - 5.20 10e6/uL    Hemoglobin 13.1 11.7 - 15.7 g/dL    Hematocrit 40.1 35.0 - 47.0 %    MCV 90 78 - 100 fL    MCH 29.2 26.5 - 33.0 pg    MCHC 32.7 31.5 - 36.5 g/dL    RDW 16.7 (H) 10.0 - 15.0 %    Platelet Count 482 (H) 150 - 450 10e3/uL    % Neutrophils 89 %    % Lymphocytes 8 %    % Monocytes 2 %    % Eosinophils 0 %    % Basophils 0 %    % Immature Granulocytes 1 %    NRBCs per 100 WBC 1 (H) <1 /100    Absolute Neutrophils 6.3 1.6 - 8.3 10e3/uL    Absolute Lymphocytes 0.6 (L) 0.8 - 5.3 10e3/uL    Absolute Monocytes 0.2 0.0 - 1.3 10e3/uL    Absolute Eosinophils 0.0 0.0 - 0.7 10e3/uL    Absolute Basophils 0.0 0.0 - 0.2 10e3/uL    Absolute Immature Granulocytes 0.1 <=0.4 10e3/uL    Absolute NRBCs 0.1 10e3/uL

## 2024-08-28 NOTE — PROGRESS NOTES
Infusion Nursing Note:  Cara Cool presents today for Cycle 1 Day 1 Adriamycin, Vincristine, Cytoxan, Rituximab-abbs (Truxima) and Neulasta On-Pro.    Patient seen by provider today: No   present during visit today: Not Applicable.    Note: Patient is new to the infusion room today and is receiving Adriamycin, Vincristine, Cytoxan, Rituximab-abbs (Truxima) and Neulasta On-Pro for the first time.  Patient oriented to infusion room, location of bathrooms and nutrition stations, and call light.  Verified that patient recieved written chemotherapy information previously.  Verbally reviewed chemotherapy teaching, side effects, take-home medications, and follow-up schedule with patient. Patient instructed to call triage with any questions or if he experiences a temperature >100.4, shaking chills, uncontrolled nausea/vomiting/diarrhea, dizziness, shortness of breath, bleeding not relieved with pressure, or with any other concerns.     Per pt, Dr. Yu prescribed a 7 day course of Prednisone (60mg daily x 7 days) and pt states she has one day left of dose. Notified Xena Raya and Dr. Yu to clarify regimen Pred dosing.    Per Dr. Yu/Silvia Michelle, RN @1223 8/28/24:  - Let's do 50mg Prednisone (okay to start new course of Pred)      Intravenous Access:  Peripheral IV placed.    Treatment Conditions:  Lab Results   Component Value Date    HGB 13.1 08/28/2024    WBC 7.1 08/28/2024    ANEU 3.3 09/28/2021    ANEUTAUTO 6.3 08/28/2024     (H) 08/28/2024        Lab Results   Component Value Date     08/28/2024    POTASSIUM 4.7 08/28/2024    MAG 1.7 07/17/2024    CR 0.76 08/28/2024    TREVOR 9.5 08/28/2024    BILITOTAL 0.9 08/28/2024    ALBUMIN 3.9 08/28/2024    ALT 9 08/28/2024    AST 22 08/28/2024       Results reviewed, labs MET treatment parameters, ok to proceed with treatment.  ECHO/MUGA completed 7/18/24  EF >70%.      Post Infusion Assessment:  Patient tolerated infusion without  incident.  Blood return noted pre and post infusion.  Blood return noted during Adriamycin administration every 2 cc.  Blood return pre, during and post Vincristine gravity infusion.  Site patent and intact, free from redness, edema or discomfort.  No evidence of extravasations.  Access discontinued per protocol.     Neulasta Onpro On-Body injector applied to right upper abdomen at ~1500.  Writer discussed Neulasta injection would start tomorrow 8/29 at 1800, approximately 27 hours after application applied today.    Written and Verbal instruction reviewed with patient.  Pt instructed when the dose delivery starts, it will take about 45 minutes to complete.  Pt aware Neulasta Onpro On-Body should have green flashing light and to call triage or on-call MD if injector flashes red or appears to be leaking.   Pt aware to keep Onpro On-Body Neulasta 4 inches away from electrical equipment and to avoid showering 4 hours prior to injection.   Neulasta Onpro Lot number: See MAR comment.        Discharge Plan:   Prescription refills given for Acyclovir, Allopurinol, Prednisone, Zofran and Compazine.  Discharge instructions reviewed with: Patient and daughter.  Patient and/or family verbalized understanding of discharge instructions and all questions answered.  Copy of AVS reviewed with patient and/or family.  Patient will return 9/18 for next appointment - appt yet to be scheduled. Pt aware.  Patient discharged in stable condition accompanied by: daughter.  Departure Mode: Ambulatory with cane.      Silvia Michelle RN

## 2024-08-30 ENCOUNTER — NURSE TRIAGE (OUTPATIENT)
Dept: ONCOLOGY | Facility: CLINIC | Age: 84
End: 2024-08-30

## 2024-08-30 DIAGNOSIS — C83.398 DIFFUSE LARGE B-CELL LYMPHOMA OF EXTRANODAL SITE: Primary | ICD-10-CM

## 2024-08-30 PROCEDURE — 94642 AEROSOL INHALATION TREATMENT: CPT | Performed by: INTERNAL MEDICINE

## 2024-08-30 PROCEDURE — 94640 AIRWAY INHALATION TREATMENT: CPT | Performed by: INTERNAL MEDICINE

## 2024-08-30 RX ORDER — ALBUTEROL SULFATE 0.83 MG/ML
2.5 SOLUTION RESPIRATORY (INHALATION) ONCE
Status: COMPLETED | OUTPATIENT
Start: 2024-08-30 | End: 2024-08-30

## 2024-08-30 RX ORDER — DIPHENHYDRAMINE HYDROCHLORIDE 50 MG/ML
50 INJECTION INTRAMUSCULAR; INTRAVENOUS
Start: 2024-09-27

## 2024-08-30 RX ORDER — ALBUTEROL SULFATE 90 UG/1
1-2 AEROSOL, METERED RESPIRATORY (INHALATION)
Start: 2024-09-27

## 2024-08-30 RX ORDER — PENTAMIDINE ISETHIONATE 300 MG/300MG
300 INHALANT RESPIRATORY (INHALATION) ONCE
Status: CANCELLED
Start: 2024-09-27 | End: 2024-09-27

## 2024-08-30 RX ORDER — ALBUTEROL SULFATE 0.83 MG/ML
2.5 SOLUTION RESPIRATORY (INHALATION)
OUTPATIENT
Start: 2024-09-27

## 2024-08-30 RX ORDER — ALBUTEROL SULFATE 0.83 MG/ML
2.5 SOLUTION RESPIRATORY (INHALATION) ONCE
Status: CANCELLED
Start: 2024-09-27 | End: 2024-09-27

## 2024-08-30 RX ORDER — MEPERIDINE HYDROCHLORIDE 25 MG/ML
25 INJECTION INTRAMUSCULAR; INTRAVENOUS; SUBCUTANEOUS EVERY 30 MIN PRN
OUTPATIENT
Start: 2024-09-27

## 2024-08-30 RX ORDER — EPINEPHRINE 1 MG/ML
0.3 INJECTION, SOLUTION, CONCENTRATE INTRAVENOUS EVERY 5 MIN PRN
OUTPATIENT
Start: 2024-09-27

## 2024-08-30 RX ORDER — PENTAMIDINE ISETHIONATE 300 MG/300MG
300 INHALANT RESPIRATORY (INHALATION) ONCE
Status: COMPLETED | OUTPATIENT
Start: 2024-08-30 | End: 2024-08-30

## 2024-08-30 RX ORDER — METHYLPREDNISOLONE SODIUM SUCCINATE 125 MG/2ML
125 INJECTION, POWDER, LYOPHILIZED, FOR SOLUTION INTRAMUSCULAR; INTRAVENOUS
Start: 2024-09-27

## 2024-08-30 RX ADMIN — ALBUTEROL SULFATE 2.5 MG: 0.83 SOLUTION RESPIRATORY (INHALATION) at 12:19

## 2024-08-30 RX ADMIN — PENTAMIDINE ISETHIONATE 300 MG: 300 INHALANT RESPIRATORY (INHALATION) at 12:26

## 2024-08-30 NOTE — PROGRESS NOTES
Cara Cool was seen today for a Pentamidine nebulizer tx ordered by Dr. Darya Yu.    Patient was first given 2.5 mg / 3 ml of albuterol (LOT# [id number], NDC# [id number], EXP 11/30/2025) nebulizer, after which Pentamidine 300 mg (Lot #[id number], NDC# [id number], EXP 10/31/2025 ) mixed with 6cc Sterile H20 was administered through a filtered nebulizer.    Pre-treatment: SpO2 = 100%   HR = 71 bpm    BBS = clear    Post-treatment: SpO2 = 100%  HR = 67   BBS = clear    No adverse side effects noted by the patient.    This service today was provided under the supervision of Dr. Paige Yuen, who was available if needed.     Procedure was completed by Alin Martinez RRT

## 2024-08-30 NOTE — TELEPHONE ENCOUNTER
Pt also has a medication for 14days Allopurinol 300mg.     Per Dr. Yu order   Pt to take Allopurinol 300mg daily for 14days.

## 2024-09-03 NOTE — PROGRESS NOTES
[facility]  Date of visit: 09/04/2024      Reason for Visit: Follow-up for DLBCL germinal cell subtype with lytic lesions      Oncology HPI:  Cara is an 84 year old female with a PMH of pAF and saddle PE 2021 on Xarelto, HLD, HTN, TIA, OA, autoimmune hepatitis (on azathioprine), IgM/IgG kappa MGUS (dx 11/2017, follows with Dr. 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     Patient presented to the ED 7/17/2024 with severe and progressive mid-back pain x1 week without recent falls or injuries. Initial imaging revealed lytic lesion of left T12 transverse process and T11 fracture. She was admitted for pain control and further work-up. Additional imaging (CT aortic survey, MRI thoracolumbar, bone scan) had evidence of widespread bony metastasis on thoracolumbar spine, proximal sacrum, bilateral ischium, ribs, and possibly L femur w/o clear evidence of primary source. She completed an outpatient bone bx 7/30 via neurosurgery.    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 The Ki-67 proliferation index is estimated to be 50-60% .  Echo shows preserved EF of 60 to 65%.  HIV hepatitis B negative.  FISH is pending for Bcl-2 and MYC rearrangement. 8/15/24  PET scan showed numerous scattered hypermetabolic lesions throughout axial skeleton largest on most FDG avid is T11.  There is current extension into spinal canal and 2 neural foramina for T10/T11 and T11/T12.  There is a hypermetabolic lesion on the liver that might be related to lymphoma.  Numerous hypermetabolic lesions in appendicular skeleton including the left humeral head and right mid femur.  Hypermetabolic activity in the short  Make sure he is on cancellation list for GI.    I see overall normal labs of those what we added on.    Only liver enzymes were slightly higher.    Make sure will repeat hepatic function panel this week, I want to see if there is any change. segment narrowing of sigmoid colon.  This scan was discussed with radiologist.  Due to the fact that PET scan is with CT without contrast, reliable assessment of spinal nerve roots was not done and MRI was recommended.  Patient did not have any weakness or neuropathy or sciatica concerning for nerve root involvement.  She was quite symptomatic from her disease in terms of bone pains and require treatment in time sensitive manner.  Considering patient's age we did not think patient could tolerate concurrent M-R-CHOP.  Majority of patient's disease burden is symptomatic and external compression to nerve root and dural involvement can also be treated by R-CHOP.  Decided to proceed with mini-R-CHOP (C1D1=8/28/24).      Interval history:   Caar presents to clinic for toxicity check after receiving C1 mini-R-CHOP. She is feeling great and does not feel like she is having side-effects.  Her energy levels have been good and she has been cooking.  She is eating and drinking well with no issues.  Denies fevers/chills, night sweats, N/V/D/C, bleeding issues, neuropathy, urinary changes, mouth sores, new pains, new lumps/bumps.    Current Outpatient Medications   Medication Sig Dispense Refill    acyclovir (ZOVIRAX) 400 MG tablet Take 1 tablet (400 mg) by mouth every 12 hours. 60 tablet 11    alendronate (FOSAMAX) 70 MG tablet Take 70 mg by mouth every 7 days On Sundays      allopurinol (ZYLOPRIM) 300 MG tablet Take 1 tablet (300 mg) by mouth daily for 14 days. 14 tablet 3    atorvastatin (LIPITOR) 20 MG tablet Take 20 mg by mouth daily      azaTHIOprine (IMURAN) 50 MG tablet Take 75 mg by mouth daily      hydrochlorothiazide (HYDRODIURIL) 50 MG tablet Take 50 mg by mouth daily      metoprolol tartrate (LOPRESSOR) 25 MG tablet Take 0.5 tablets (12.5 mg) by mouth 2 times daily 60 tablet 0    polyethylene glycol (MIRALAX) 17 GM/Dose powder Take 1 Capful by mouth daily as needed for constipation      predniSONE (DELTASONE) 50 MG  tablet Take 1 tablet (50 mg) by mouth daily. Take on Days 1 through 5. Take first dose in AM prior to chemotherapy. 10 tablet 5    spironolactone (ALDACTONE) 50 MG tablet Take 50 mg by mouth daily      Vitamin D3 (VITAMIN D-1000 MAX ST) 25 mcg (1000 units) tablet Take 1,000 Units by mouth daily      digoxin (LANOXIN) 125 MCG tablet Take 125 mcg by mouth daily (Patient not taking: Reported on 9/4/2024)      ondansetron (ZOFRAN) 8 MG tablet Take 1 tablet (8 mg) by mouth every 8 hours as needed for nausea (vomiting). (Patient not taking: Reported on 9/4/2024) 30 tablet 2    oxyCODONE (ROXICODONE) 5 MG tablet Take 1 tablet (5 mg) by mouth every 4 hours as needed for severe pain (Patient not taking: Reported on 9/4/2024) 9 tablet 0    prochlorperazine (COMPAZINE) 10 MG tablet Take 1 tablet (10 mg) by mouth every 6 hours as needed for nausea or vomiting. (Patient not taking: Reported on 9/4/2024) 30 tablet 2       No Known Allergies      Physical Exam:  BP (!) 150/82   Pulse 70   Temp 98  F (36.7  C)   Resp 20   Wt 105.7 kg (233 lb)   SpO2 98%   BMI 38.35 kg/m    General: No acute distress, pleasant, well-groomed  HEENT: EOMI, PERRL. Sclerae are anicteric. Oral exam deferred  Lymph: Neck is supple with no lymphadenopathy in the cervical or supraclavicular areas.   Heart: Regular rate and rhythm.   Lungs: Clear to auscultation bilaterally.   Extremities: No lower extremity edema noted bilaterally.   Neuro: Alert and oriented x3, CN grossly intact, steady gait  Skin: No rashes, petechiae, or bruising noted on exposed skin.      Labs:   Most Recent 3 CBC's:  Recent Labs   Lab Test 09/04/24  1557 08/28/24  1051 08/15/24  1241   WBC 4.4 7.1 4.8   HGB 13.2 13.1 13.3   MCV 87 90 92    482* 264   ANEUTAUTO 4.0 6.3 2.8     Most Recent 3 BMP's:  Recent Labs   Lab Test 09/04/24  1557 08/28/24  1051 08/15/24  1241    139 142   POTASSIUM 4.5 4.7 4.4   CHLORIDE 102 106 106   CO2 20* 22 25   BUN 24.8* 23.2* 23.8*   CR  0.73 0.76 0.90   ANIONGAP 14 11 11   TREVOR 9.7 9.5 9.9   * 123* 87   PROTTOTAL 7.3 7.4 7.5   ALBUMIN 4.0 3.9 3.8    Most Recent 3 LFT's:  Recent Labs   Lab Test 09/04/24  1557 08/28/24  1051 08/15/24  1241   AST 17 22 22   ALT 8 9 6   ALKPHOS 230* 237* 236*   BILITOTAL 0.9 0.9 0.8    Most Recent 2 TSH and T4:No lab results found.  I reviewed the above labs today.    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  - Mini-R-CHOP (C1D1=8/28/24)- plan for total of 6 cycles  - S/p C1 and tolerating treatment well with no noticeable side-effects/toxicities  - PET scan after 2 cycles- scheduled for 10/4  - Follow-up with Dr. Yu 10/7    Ppx:  - Acyclovir 400 mg BID  - Neb Pentamidine monthly- next due 9/27    Autoimmune hepatitis B:  -Azathioprine should be discontinued.  Chemotherapy should provide immunosuppression that will help hepatitis and prednisone will help to.   - Encouraged patient and her daughter to discuss with hepatologist.        25 minutes spent on the date of the encounter doing chart review, review of test results, interpretation of tests, patient visit, and documentation     VALENTINA Montenegro CNP  [facility]  [address], [place] [id number] [phone]

## 2024-09-04 ENCOUNTER — ONCOLOGY VISIT (OUTPATIENT)
Dept: ONCOLOGY | Facility: CLINIC | Age: 84
End: 2024-09-04
Attending: STUDENT IN AN ORGANIZED HEALTH CARE EDUCATION/TRAINING PROGRAM
Payer: COMMERCIAL

## 2024-09-04 ENCOUNTER — APPOINTMENT (OUTPATIENT)
Dept: LAB | Facility: CLINIC | Age: 84
End: 2024-09-04
Attending: STUDENT IN AN ORGANIZED HEALTH CARE EDUCATION/TRAINING PROGRAM
Payer: COMMERCIAL

## 2024-09-04 VITALS
SYSTOLIC BLOOD PRESSURE: 150 MMHG | BODY MASS INDEX: 38.35 KG/M2 | DIASTOLIC BLOOD PRESSURE: 82 MMHG | HEART RATE: 70 BPM | OXYGEN SATURATION: 98 % | RESPIRATION RATE: 20 BRPM | TEMPERATURE: 98 F | WEIGHT: 233 LBS

## 2024-09-04 DIAGNOSIS — C83.398 DIFFUSE LARGE B-CELL LYMPHOMA OF EXTRANODAL SITE: Primary | ICD-10-CM

## 2024-09-04 DIAGNOSIS — Z51.11 ENCOUNTER FOR ANTINEOPLASTIC CHEMOTHERAPY: ICD-10-CM

## 2024-09-04 LAB
ALBUMIN SERPL BCG-MCNC: 4 G/DL (ref 3.5–5.2)
ALP SERPL-CCNC: 230 U/L (ref 40–150)
ALT SERPL W P-5'-P-CCNC: 8 U/L (ref 0–50)
ANION GAP SERPL CALCULATED.3IONS-SCNC: 14 MMOL/L (ref 7–15)
AST SERPL W P-5'-P-CCNC: 17 U/L (ref 0–45)
BASOPHILS # BLD AUTO: 0.1 10E3/UL (ref 0–0.2)
BASOPHILS NFR BLD AUTO: 1 %
BILIRUB SERPL-MCNC: 0.9 MG/DL
BUN SERPL-MCNC: 24.8 MG/DL (ref 8–23)
CALCIUM SERPL-MCNC: 9.7 MG/DL (ref 8.8–10.4)
CHLORIDE SERPL-SCNC: 102 MMOL/L (ref 98–107)
CREAT SERPL-MCNC: 0.73 MG/DL (ref 0.51–0.95)
EGFRCR SERPLBLD CKD-EPI 2021: 82 ML/MIN/1.73M2
EOSINOPHIL # BLD AUTO: 0 10E3/UL (ref 0–0.7)
EOSINOPHIL NFR BLD AUTO: 0 %
ERYTHROCYTE [DISTWIDTH] IN BLOOD BY AUTOMATED COUNT: 16.5 % (ref 10–15)
GLUCOSE SERPL-MCNC: 132 MG/DL (ref 70–99)
HCO3 SERPL-SCNC: 20 MMOL/L (ref 22–29)
HCT VFR BLD AUTO: 39.2 % (ref 35–47)
HGB BLD-MCNC: 13.2 G/DL (ref 11.7–15.7)
IMM GRANULOCYTES # BLD: 0.1 10E3/UL
IMM GRANULOCYTES NFR BLD: 2 %
LYMPHOCYTES # BLD AUTO: 0.2 10E3/UL (ref 0.8–5.3)
LYMPHOCYTES NFR BLD AUTO: 5 %
MCH RBC QN AUTO: 29.3 PG (ref 26.5–33)
MCHC RBC AUTO-ENTMCNC: 33.7 G/DL (ref 31.5–36.5)
MCV RBC AUTO: 87 FL (ref 78–100)
MONOCYTES # BLD AUTO: 0.1 10E3/UL (ref 0–1.3)
MONOCYTES NFR BLD AUTO: 2 %
NEUTROPHILS # BLD AUTO: 4 10E3/UL (ref 1.6–8.3)
NEUTROPHILS NFR BLD AUTO: 90 %
NRBC # BLD AUTO: 0 10E3/UL
NRBC BLD AUTO-RTO: 0 /100
PLATELET # BLD AUTO: 314 10E3/UL (ref 150–450)
POTASSIUM SERPL-SCNC: 4.5 MMOL/L (ref 3.4–5.3)
PROT SERPL-MCNC: 7.3 G/DL (ref 6.4–8.3)
RBC # BLD AUTO: 4.5 10E6/UL (ref 3.8–5.2)
SODIUM SERPL-SCNC: 136 MMOL/L (ref 135–145)
URATE SERPL-MCNC: 4.4 MG/DL (ref 2.4–5.7)
WBC # BLD AUTO: 4.4 10E3/UL (ref 4–11)

## 2024-09-04 PROCEDURE — 84550 ASSAY OF BLOOD/URIC ACID: CPT

## 2024-09-04 PROCEDURE — 99213 OFFICE O/P EST LOW 20 MIN: CPT

## 2024-09-04 PROCEDURE — 36415 COLL VENOUS BLD VENIPUNCTURE: CPT

## 2024-09-04 PROCEDURE — 80053 COMPREHEN METABOLIC PANEL: CPT

## 2024-09-04 PROCEDURE — G2211 COMPLEX E/M VISIT ADD ON: HCPCS

## 2024-09-04 PROCEDURE — 85004 AUTOMATED DIFF WBC COUNT: CPT

## 2024-09-04 PROCEDURE — G0463 HOSPITAL OUTPT CLINIC VISIT: HCPCS

## 2024-09-04 RX ORDER — PREDNISONE 50 MG/1
50 TABLET ORAL DAILY
Qty: 10 TABLET | Refills: 5 | Status: SHIPPED | OUTPATIENT
Start: 2024-09-04

## 2024-09-04 ASSESSMENT — PAIN SCALES - GENERAL: PAINLEVEL: NO PAIN (0)

## 2024-09-04 NOTE — NURSING NOTE
Chief Complaint   Patient presents with    Blood Draw     Labs collected from venipuncture by RN. Vitals taken. Checked in for appointment(s).      Labs collected from venipuncture by RN. Vitals taken. Checked in for appointment(s).     Ledy Casillas RN

## 2024-09-04 NOTE — LETTER
9/4/2024      Cara Cool  [address] [place] [id number]      Dear Colleague,    Thank you for referring your patient, Cara Cool, to the [facility]. Please see a copy of my visit note below.    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     Patient presented to the ED 7/17/2024 with severe and progressive mid-back pain x1 week without recent falls or injuries. Initial imaging revealed lytic lesion of left T12 transverse process and T11 fracture. She was admitted for pain control and further work-up. Additional imaging (CT aortic survey, MRI thoracolumbar, bone scan) had evidence of widespread bony metastasis on thoracolumbar spine, proximal sacrum, bilateral ischium, ribs, and possibly L femur w/o clear evidence of primary source. She completed an outpatient bone bx 7/30 via neurosurgery.    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 The Ki-67 proliferation index is estimated to be 50-60% .  Echo shows preserved EF of 60 to 65%.  HIV hepatitis B negative.  FISH is pending for Bcl-2 and MYC rearrangement. 8/15/24  PET scan showed numerous scattered hypermetabolic lesions throughout axial skeleton largest on most FDG avid is T11.  There is current extension into spinal canal and 2 neural foramina for T10/T11 and  T11/T12.  There is a hypermetabolic lesion on the liver that might be related to lymphoma.  Numerous hypermetabolic lesions in appendicular skeleton including the left humeral head and right mid femur.  Hypermetabolic activity in the short segment narrowing of sigmoid colon.  This scan was discussed with radiologist.  Due to the fact that PET scan is with CT without contrast, reliable assessment of spinal nerve roots was not done and MRI was recommended.  Patient did not have any weakness or neuropathy or sciatica concerning for nerve root involvement.  She was quite symptomatic from her disease in terms of bone pains and require treatment in time sensitive manner.  Considering patient's age we did not think patient could tolerate concurrent M-R-CHOP.  Majority of patient's disease burden is symptomatic and external compression to nerve root and dural involvement can also be treated by R-CHOP.  Decided to proceed with mini-R-CHOP (C1D1=8/28/24).      Interval history:   Cara presents to clinic for toxicity check after receiving C1 mini-R-CHOP. She is feeling great and does not feel like she is having side-effects.  Her energy levels have been good and she has been cooking.  She is eating and drinking well with no issues.  Denies fevers/chills, night sweats, N/V/D/C, bleeding issues, neuropathy, urinary changes, mouth sores, new pains, new lumps/bumps.    Current Outpatient Medications   Medication Sig Dispense Refill     acyclovir (ZOVIRAX) 400 MG tablet Take 1 tablet (400 mg) by mouth every 12 hours. 60 tablet 11     alendronate (FOSAMAX) 70 MG tablet Take 70 mg by mouth every 7 days On Sundays       allopurinol (ZYLOPRIM) 300 MG tablet Take 1 tablet (300 mg) by mouth daily for 14 days. 14 tablet 3     atorvastatin (LIPITOR) 20 MG tablet Take 20 mg by mouth daily       azaTHIOprine (IMURAN) 50 MG tablet Take 75 mg by mouth daily       hydrochlorothiazide (HYDRODIURIL) 50 MG tablet Take 50 mg by mouth daily        metoprolol tartrate (LOPRESSOR) 25 MG tablet Take 0.5 tablets (12.5 mg) by mouth 2 times daily 60 tablet 0     polyethylene glycol (MIRALAX) 17 GM/Dose powder Take 1 Capful by mouth daily as needed for constipation       predniSONE (DELTASONE) 50 MG tablet Take 1 tablet (50 mg) by mouth daily. Take on Days 1 through 5. Take first dose in AM prior to chemotherapy. 10 tablet 5     spironolactone (ALDACTONE) 50 MG tablet Take 50 mg by mouth daily       Vitamin D3 (VITAMIN D-1000 MAX ST) 25 mcg (1000 units) tablet Take 1,000 Units by mouth daily       digoxin (LANOXIN) 125 MCG tablet Take 125 mcg by mouth daily (Patient not taking: Reported on 9/4/2024)       ondansetron (ZOFRAN) 8 MG tablet Take 1 tablet (8 mg) by mouth every 8 hours as needed for nausea (vomiting). (Patient not taking: Reported on 9/4/2024) 30 tablet 2     oxyCODONE (ROXICODONE) 5 MG tablet Take 1 tablet (5 mg) by mouth every 4 hours as needed for severe pain (Patient not taking: Reported on 9/4/2024) 9 tablet 0     prochlorperazine (COMPAZINE) 10 MG tablet Take 1 tablet (10 mg) by mouth every 6 hours as needed for nausea or vomiting. (Patient not taking: Reported on 9/4/2024) 30 tablet 2       No Known Allergies      Physical Exam:  BP (!) 150/82   Pulse 70   Temp 98  F (36.7  C)   Resp 20   Wt 105.7 kg (233 lb)   SpO2 98%   BMI 38.35 kg/m    General: No acute distress, pleasant, well-groomed  HEENT: EOMI, PERRL. Sclerae are anicteric. Oral exam deferred  Lymph: Neck is supple with no lymphadenopathy in the cervical or supraclavicular areas.   Heart: Regular rate and rhythm.   Lungs: Clear to auscultation bilaterally.   Extremities: No lower extremity edema noted bilaterally.   Neuro: Alert and oriented x3, CN grossly intact, steady gait  Skin: No rashes, petechiae, or bruising noted on exposed skin.      Labs:   Most Recent 3 CBC's:  Recent Labs   Lab Test 09/04/24  1557 08/28/24  1051 08/15/24  1241   WBC 4.4 7.1 4.8   HGB 13.2 13.1 13.3    MCV 87 90 92    482* 264   ANEUTAUTO 4.0 6.3 2.8     Most Recent 3 BMP's:  Recent Labs   Lab Test 09/04/24  1557 08/28/24  1051 08/15/24  1241    139 142   POTASSIUM 4.5 4.7 4.4   CHLORIDE 102 106 106   CO2 20* 22 25   BUN 24.8* 23.2* 23.8*   CR 0.73 0.76 0.90   ANIONGAP 14 11 11   TREVOR 9.7 9.5 9.9   * 123* 87   PROTTOTAL 7.3 7.4 7.5   ALBUMIN 4.0 3.9 3.8    Most Recent 3 LFT's:  Recent Labs   Lab Test 09/04/24  1557 08/28/24  1051 08/15/24  1241   AST 17 22 22   ALT 8 9 6   ALKPHOS 230* 237* 236*   BILITOTAL 0.9 0.9 0.8    Most Recent 2 TSH and T4:No lab results found.  I reviewed the above labs today.    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  - Mini-R-CHOP (C1D1=8/28/24)- plan for total of 6 cycles  - S/p C1 and tolerating treatment well with no noticeable side-effects/toxicities  - PET scan after 2 cycles- scheduled for 10/4  - Follow-up with Dr. Yu 10/7    Ppx:  - Acyclovir 400 mg BID  - Neb Pentamidine monthly- next due 9/27    Autoimmune hepatitis B:  -Azathioprine should be discontinued.  Chemotherapy should provide immunosuppression that will help hepatitis and prednisone will help to.   - Encouraged patient and her daughter to discuss with hepatologist.        25 minutes spent on the date of the encounter doing chart review, review of test results, interpretation of tests, patient visit, and documentation     VALENTINA Montenegro CNP  [facility] [address] [address] [phone]      Again, thank you for allowing me to participate in the care of your patient.        Sincerely,        VALENTINA Montenegro CNP

## 2024-09-04 NOTE — NURSING NOTE
"Oncology Rooming Note    September 4, 2024 4:14 PM   Cara Cool is a 84 year old female who presents for:    Chief Complaint   Patient presents with    Blood Draw     Labs collected from venipuncture by RN. Vitals taken. Checked in for appointment(s).     Oncology Clinic Visit     Diffuse large B-cell lymphoma      Initial Vitals: BP (!) 150/82   Pulse 70   Temp 98  F (36.7  C)   Resp 20   Wt 105.7 kg (233 lb)   SpO2 98%   BMI 38.35 kg/m   Estimated body mass index is 38.35 kg/m  as calculated from the following:    Height as of 8/28/24: 1.66 m (5' 5.35\").    Weight as of this encounter: 105.7 kg (233 lb). Body surface area is 2.21 meters squared.  No Pain (0) Comment: Data Unavailable   No LMP recorded. Patient is postmenopausal.  Allergies reviewed: Yes  Medications reviewed: Yes    Medications: Medication refills not needed today.  Pharmacy name entered into [email or web address]: [facility] - [address]    Frailty Screening:   Is the patient here for a new oncology consult visit in cancer care? 2. No      Clinical concerns: which medication would interfere with patient sleeping   Hellen was notified.      Khushboo Rodriguez              "

## 2024-09-05 ENCOUNTER — TELEPHONE (OUTPATIENT)
Dept: PULMONOLOGY | Facility: CLINIC | Age: 84
End: 2024-09-05
Payer: COMMERCIAL

## 2024-09-05 NOTE — TELEPHONE ENCOUNTER
Patient Contacted for the patient to call back and schedule the following:    Appointment type: INDRIA  Provider: KATHRYN GARCIA  Return date: 10/7/24  Specialty phone number: [phone]  Additional appointment(s) needed: N/A  Additonal Notes: talked with patient, stated to call daughter for scheduling as daughter figures out rides for patient. No c2c on file for daughter, obtained one time consent for scheduling with daughter from pt.

## 2024-09-05 NOTE — TELEPHONE ENCOUNTER
Spoke with daughter for scheduling, Patient stated to call daughter Denis for scheduling. No consent to communicate on file, obtained one time consent from patient to call daughter.    Appointment type: INDIRA  Provider: KATHRYN GARCIA  Return date: 10/7/24  Specialty phone number: [phone]  Additional appointment(s) needed: N/A  Additonal Notes: N/A

## 2024-09-09 ENCOUNTER — MYC MEDICAL ADVICE (OUTPATIENT)
Dept: INTERVENTIONAL RADIOLOGY/VASCULAR | Facility: CLINIC | Age: 84
End: 2024-09-09
Payer: COMMERCIAL

## 2024-09-11 ENCOUNTER — PATIENT OUTREACH (OUTPATIENT)
Dept: ONCOLOGY | Facility: CLINIC | Age: 84
End: 2024-09-11
Payer: COMMERCIAL

## 2024-09-11 NOTE — PROGRESS NOTES
[facility]: Cancer Care Short Note                                                                                          RNCC received message from radiology staff that patient had a question on her medication and was not sure how to contact oncology. RNCC called and spoke to patient.     Patient stated that she received prednisone in the mail and was unsure if she should be taking it right now. RNCC clarified with patient that prednisone should be taken the morning of Day 1 of her treatment (before her infusion) and then continued for 5 days after. Her next infusion is on 9/19, so she should take this medication that morning.     Patient understood instructions and was appreciative of the call. RNCC gave patient clinic phone number again and went over contacting the clinic and triage nurses with patient.     MANUEL CrouchN, RN  RN Care Coordinator   [phone]

## 2024-09-16 ENCOUNTER — HOSPITAL ENCOUNTER (OUTPATIENT)
Facility: CLINIC | Age: 84
Discharge: HOME | End: 2024-09-16
Attending: STUDENT IN AN ORGANIZED HEALTH CARE EDUCATION/TRAINING PROGRAM | Admitting: PHYSICIAN ASSISTANT
Payer: COMMERCIAL

## 2024-09-16 ENCOUNTER — APPOINTMENT (OUTPATIENT)
Dept: INTERVENTIONAL RADIOLOGY/VASCULAR | Facility: CLINIC | Age: 84
End: 2024-09-16
Attending: STUDENT IN AN ORGANIZED HEALTH CARE EDUCATION/TRAINING PROGRAM
Payer: COMMERCIAL

## 2024-09-16 ENCOUNTER — APPOINTMENT (OUTPATIENT)
Dept: MEDSURG UNIT | Facility: CLINIC | Age: 84
End: 2024-09-16
Attending: STUDENT IN AN ORGANIZED HEALTH CARE EDUCATION/TRAINING PROGRAM
Payer: COMMERCIAL

## 2024-09-16 VITALS
OXYGEN SATURATION: 100 % | TEMPERATURE: 97.9 F | SYSTOLIC BLOOD PRESSURE: 105 MMHG | WEIGHT: 232.6 LBS | RESPIRATION RATE: 16 BRPM | DIASTOLIC BLOOD PRESSURE: 54 MMHG | HEART RATE: 66 BPM | BODY MASS INDEX: 38.29 KG/M2

## 2024-09-16 DIAGNOSIS — C83.398 DIFFUSE LARGE B-CELL LYMPHOMA OF EXTRANODAL SITE: ICD-10-CM

## 2024-09-16 LAB — INR PPP: 2.53 (ref 0.85–1.15)

## 2024-09-16 PROCEDURE — 999N000142 HC STATISTIC PROCEDURE PREP ONLY

## 2024-09-16 PROCEDURE — 999N000128 HC STATISTIC PERIPHERAL IV START W/O US GUIDANCE

## 2024-09-16 PROCEDURE — 250N000009 HC RX 250: Performed by: STUDENT IN AN ORGANIZED HEALTH CARE EDUCATION/TRAINING PROGRAM

## 2024-09-16 PROCEDURE — 85610 PROTHROMBIN TIME: CPT | Performed by: NURSE PRACTITIONER

## 2024-09-16 PROCEDURE — 272N000192 HC ACCESSORY CR2

## 2024-09-16 PROCEDURE — C1769 GUIDE WIRE: HCPCS

## 2024-09-16 PROCEDURE — 250N000011 HC RX IP 250 OP 636: Performed by: NURSE PRACTITIONER

## 2024-09-16 PROCEDURE — 36415 COLL VENOUS BLD VENIPUNCTURE: CPT | Performed by: NURSE PRACTITIONER

## 2024-09-16 PROCEDURE — 272N000504 HC NEEDLE CR4

## 2024-09-16 PROCEDURE — 258N000003 HC RX IP 258 OP 636: Performed by: NURSE PRACTITIONER

## 2024-09-16 PROCEDURE — 250N000011 HC RX IP 250 OP 636: Performed by: STUDENT IN AN ORGANIZED HEALTH CARE EDUCATION/TRAINING PROGRAM

## 2024-09-16 PROCEDURE — 99152 MOD SED SAME PHYS/QHP 5/>YRS: CPT

## 2024-09-16 PROCEDURE — 99152 MOD SED SAME PHYS/QHP 5/>YRS: CPT | Performed by: PHYSICIAN ASSISTANT

## 2024-09-16 PROCEDURE — C1788 PORT, INDWELLING, IMP: HCPCS

## 2024-09-16 PROCEDURE — 999N000132 HC STATISTIC PP CARE STAGE 1

## 2024-09-16 PROCEDURE — 36561 INSERT TUNNELED CV CATH: CPT | Mod: RT | Performed by: PHYSICIAN ASSISTANT

## 2024-09-16 PROCEDURE — 250N000011 HC RX IP 250 OP 636: Performed by: PHYSICIAN ASSISTANT

## 2024-09-16 PROCEDURE — 77001 FLUOROGUIDE FOR VEIN DEVICE: CPT | Mod: 26 | Performed by: PHYSICIAN ASSISTANT

## 2024-09-16 PROCEDURE — 76937 US GUIDE VASCULAR ACCESS: CPT | Mod: 26 | Performed by: PHYSICIAN ASSISTANT

## 2024-09-16 RX ORDER — HEPARIN SODIUM (PORCINE) LOCK FLUSH IV SOLN 100 UNIT/ML 100 UNIT/ML
5 SOLUTION INTRAVENOUS
Status: COMPLETED | OUTPATIENT
Start: 2024-09-16 | End: 2024-09-16

## 2024-09-16 RX ORDER — HEPARIN SODIUM,PORCINE 10 UNIT/ML
5-10 VIAL (ML) INTRAVENOUS
Status: DISCONTINUED | OUTPATIENT
Start: 2024-09-16 | End: 2024-09-16 | Stop reason: HOSPADM

## 2024-09-16 RX ORDER — CEFAZOLIN SODIUM 2 G/100ML
2 INJECTION, SOLUTION INTRAVENOUS
Status: COMPLETED | OUTPATIENT
Start: 2024-09-16 | End: 2024-09-16

## 2024-09-16 RX ORDER — FLUMAZENIL 0.1 MG/ML
0.2 INJECTION, SOLUTION INTRAVENOUS
Status: DISCONTINUED | OUTPATIENT
Start: 2024-09-16 | End: 2024-09-16 | Stop reason: HOSPADM

## 2024-09-16 RX ORDER — SODIUM CHLORIDE 9 MG/ML
INJECTION, SOLUTION INTRAVENOUS CONTINUOUS
Status: DISCONTINUED | OUTPATIENT
Start: 2024-09-16 | End: 2024-09-16 | Stop reason: HOSPADM

## 2024-09-16 RX ORDER — NALOXONE HYDROCHLORIDE 0.4 MG/ML
0.4 INJECTION, SOLUTION INTRAMUSCULAR; INTRAVENOUS; SUBCUTANEOUS
Status: DISCONTINUED | OUTPATIENT
Start: 2024-09-16 | End: 2024-09-16 | Stop reason: HOSPADM

## 2024-09-16 RX ORDER — FENTANYL CITRATE 50 UG/ML
25-50 INJECTION, SOLUTION INTRAMUSCULAR; INTRAVENOUS EVERY 5 MIN PRN
Status: DISCONTINUED | OUTPATIENT
Start: 2024-09-16 | End: 2024-09-16 | Stop reason: HOSPADM

## 2024-09-16 RX ORDER — HEPARIN SODIUM,PORCINE 10 UNIT/ML
5-10 VIAL (ML) INTRAVENOUS EVERY 24 HOURS
Status: DISCONTINUED | OUTPATIENT
Start: 2024-09-16 | End: 2024-09-16 | Stop reason: HOSPADM

## 2024-09-16 RX ORDER — LIDOCAINE 40 MG/G
CREAM TOPICAL
Status: DISCONTINUED | OUTPATIENT
Start: 2024-09-16 | End: 2024-09-16 | Stop reason: HOSPADM

## 2024-09-16 RX ORDER — HEPARIN SODIUM (PORCINE) LOCK FLUSH IV SOLN 100 UNIT/ML 100 UNIT/ML
5-10 SOLUTION INTRAVENOUS
Status: DISCONTINUED | OUTPATIENT
Start: 2024-09-16 | End: 2024-09-16 | Stop reason: HOSPADM

## 2024-09-16 RX ORDER — ONDANSETRON 2 MG/ML
4 INJECTION INTRAMUSCULAR; INTRAVENOUS
Status: DISCONTINUED | OUTPATIENT
Start: 2024-09-16 | End: 2024-09-16 | Stop reason: HOSPADM

## 2024-09-16 RX ORDER — NALOXONE HYDROCHLORIDE 0.4 MG/ML
0.2 INJECTION, SOLUTION INTRAMUSCULAR; INTRAVENOUS; SUBCUTANEOUS
Status: DISCONTINUED | OUTPATIENT
Start: 2024-09-16 | End: 2024-09-16 | Stop reason: HOSPADM

## 2024-09-16 RX ADMIN — CEFAZOLIN SODIUM 2 G: 2 INJECTION, SOLUTION INTRAVENOUS at 11:53

## 2024-09-16 RX ADMIN — SODIUM CHLORIDE: 9 INJECTION, SOLUTION INTRAVENOUS at 11:54

## 2024-09-16 RX ADMIN — FENTANYL CITRATE 50 MCG: 50 INJECTION, SOLUTION INTRAMUSCULAR; INTRAVENOUS at 13:49

## 2024-09-16 RX ADMIN — LIDOCAINE HYDROCHLORIDE 20 ML: 10 INJECTION, SOLUTION EPIDURAL; INFILTRATION; INTRACAUDAL; PERINEURAL at 14:23

## 2024-09-16 RX ADMIN — MIDAZOLAM 0.5 MG: 1 INJECTION INTRAMUSCULAR; INTRAVENOUS at 14:00

## 2024-09-16 RX ADMIN — HEPARIN 5 ML: 100 SYRINGE at 14:15

## 2024-09-16 RX ADMIN — MIDAZOLAM 1 MG: 1 INJECTION INTRAMUSCULAR; INTRAVENOUS at 13:49

## 2024-09-16 RX ADMIN — FENTANYL CITRATE 25 MCG: 50 INJECTION, SOLUTION INTRAMUSCULAR; INTRAVENOUS at 14:00

## 2024-09-16 RX ADMIN — MIDAZOLAM 0.5 MG: 1 INJECTION INTRAMUSCULAR; INTRAVENOUS at 14:09

## 2024-09-16 RX ADMIN — FENTANYL CITRATE 25 MCG: 50 INJECTION, SOLUTION INTRAMUSCULAR; INTRAVENOUS at 14:09

## 2024-09-16 ASSESSMENT — ACTIVITIES OF DAILY LIVING (ADL)
ADLS_ACUITY_SCORE: 37
ADLS_ACUITY_SCORE: 37
ADLS_ACUITY_SCORE: 38
ADLS_ACUITY_SCORE: 37

## 2024-09-16 NOTE — IR NOTE
Patient Name: Cara Cool  Medical Record Number: [id number]  Today's Date: 9/16/2024    Procedure: Port placement  Proceduralist: Martine Eden PA-C      Procedure Start: 1345  Procedure end: 1425  Sedation medications administered: 100 mcg Fentanyl, 2 mg Midazolam     Report given to: GAGE Mcfarland RN      Other Notes: Pt arrived to IR room 2 from . Consent reviewed. Pt denies any questions or concerns regarding procedure. Pt positioned supine and monitored per protocol. Pt tolerated procedure without any noted complications. Pt transferred back to .

## 2024-09-16 NOTE — PROGRESS NOTES
Pt is tolerating liquids and foods, ambulating, urinating, puncture sites are stable (no bleeding and no hematoma). Pt has a  - granddaughter.  A/O x4 and making needs known.  VSS.  IV access removed.  Discharge education completed and outlined in AVS or handout: medications reviewed with pt.  All questions answered and addressed.  Belongings returned to pt at discharge.  Discharged to self care.  Pt brought out to front lobby via wheelchair, accompanied by family.

## 2024-09-16 NOTE — PRE-PROCEDURE
GENERAL PRE-PROCEDURE:   Procedure:  Image guided chest port placement  Date/Time:  9/16/2024 12:18 PM    Written consent obtained?: Yes    Risks and benefits: Risks, benefits and alternatives were discussed    Consent given by:  Patient  Patient states understanding of procedure being performed: Yes    Patient's understanding of procedure matches consent: Yes    Procedure consent matches procedure scheduled: Yes    Expected level of sedation:  Moderate  Appropriately NPO:  Yes  ASA Class:  3  Mallampati  :  Grade 2- soft palate, base of uvula, tonsillar pillars, and portion of posterior pharyngeal wall visible  Lungs:  Lungs clear with good breath sounds bilaterally  Heart:  Normal heart sounds and rate  History & Physical reviewed:  History and physical reviewed and no updates needed  Statement of review:  I have reviewed the lab findings, diagnostic data, medications, and the plan for sedation    Last dose Xarelto this AM.

## 2024-09-16 NOTE — DISCHARGE INSTRUCTIONS
.[facility]        Interventional Radiology  Discharge Instructions  Following Port Placement  You had a port placed. A port is a small medical device that is placed under the skin and is connected to a vein with a catheter (thin, flexible tube). Ports can be used to administer IV medications (including chemotherapy), fluids or blood products or for blood lab draws. Please follow the below instructions after your procedure:    Your Port has been closed with  Absorbable suture  If after 10 days there are visible suture they may be trimmed by your primary doctor  Dermduarte Lockhart (Skin Glue)    If there is any oozing or bleeding from the site, apply direct pressure for 5-10 minutes with a gauze pad.  If bleeding continues after 10 minutes call your primary doctor.  If bleeding cannot be controlled with direct pressure, call 911.    If you experience the following seek medical evaluation:    Uncontrolled bleeding from port site  Fever (greater than 100 )  Purulent (yellow/green/foul smelling) drainage from port insertion site  Increasing pain at port site  Increasing redness at port site  Increasing swelling    If you were given sedation:  We recommend an adult stay with you for the first 24 hours.  Do not drive or operate heavy machinery for the rest of the day.  No alcoholic beverages for 24 hours.    Discharge Booklet given  Care Instructions   You may shower beginning tomorrow (post procedure day #1). Do not scrub site until well healed, pat dry gently with a towel.  You likely have skin adhesive over your port site. Skin adhesive works like a bandage to keep the site covered and protected. Do not use antibiotic ointment or creams/lotions over adhesive as it can break it down. The skin adhesive will peel off on its own (typically in 5-14 days).  Avoid submerging the port site under water (ex: tub baths, Jacuzzis, lakes, hot tubs and pools) for 10 days or until your site is well healed.  You may have  some discomfort, minimal swelling, redness and/or bruising at your port site/procedure site. You may take over the counter pain medication for discomfort (follow the package directions) or apply an ice pack wrapped in a towel over the site (rotating 20 minutes with ice pack on and 20 minutes with ice pack off) for comfort as needed. It can take several days for these to resolve.  Avoid heavy lifting (greater than 10 pounds) and strenuous activities for 2-3 days following your procedure.  If you experience significant bleeding at site, apply pressure with hands above the clavicle bone, sit upright and seek immediate medical assistance.  Ports need to be flushed approximately every 4 weeks, if not being used more frequently. Follow up with the provider who ordered your port placement for further instructions for this.    [facility] INTERVENTIONAL RADIOLOGY DEPARTMENT    Procedure Physician Assistant: Martine Eden PA-C          Date of procedure: September 16, 2024    Telephone Numbers:  [phone]      Monday-Friday 7:30 am to 4:00 pm  [phone]      After 4:00 pm Monday-Friday, Weekends & Holidays.   Ask for the Interventional Radiologist on call.  Someone is on call 24 hrs/day  [facility] toll free number:    [phone]     Monday-Friday 8:00 am to 4:30 pm  [facility] Emergency Dept:     [phone]        IF YOU ARE EXPERIENCING A MEDICAL EMERGENCY PLEASE CALL [phone]

## 2024-09-16 NOTE — PROGRESS NOTES
Patient arrived to room via [facility] with IR RN s/p port placement  . VSS. Denies  pain. Pt alert and oriented x4. Right upper site CDI.

## 2024-09-16 NOTE — PROGRESS NOTES
Pt arrived to 2A from home for port placement. VSS. Denies pain. Waiting for lab to result and consent . H&P current. Allergies reviewed with pt. Appropriately NPO.  Prep completed. Daughter Sisi at bedside; will be transporting patient to home. [phone]

## 2024-09-16 NOTE — PROCEDURES
[facility]    Procedure: IR Procedure Note    Date/Time: 9/16/2024 2:27 PM    Performed by: Rickie Eden Chi, PA-C  Authorized by: Rickie Eden Chi, PA-C  IR Fellow Physician:    Pre Procedure Diagnosis: DLBCL  Post Procedure Diagnosis: same    UNIVERSAL PROTOCOL   Site Marked: NA  Prior Images Obtained and Reviewed:  Yes  Required items: Required blood products, implants, devices and special equipment available    Patient identity confirmed:  Arm band and hospital-assigned identification number  Patient was reevaluated immediately before administering moderate or deep sedation or anesthesia  Confirmation Checklist:  Patient's identity using two indicators, relevant allergies, procedure was appropriate and matched the consent or emergent situation and correct equipment/implants were available  Time out: Immediately prior to the procedure a time out was called    Universal Protocol: the Joint Commission Universal Protocol was followed    Preparation: Patient was prepped and draped in usual sterile fashion       ANESTHESIA    Anesthesia:  Local infiltration  Local Anesthetic:  Lidocaine 1% without epinephrine      SEDATION  Patient Sedated: Yes    Sedation:  Fentanyl and midazolam  Vital signs: Vital signs monitored during sedation    Findings: Procedure Start: 1345  Procedure end: 1425  Sedation medications administered: 100 mcg Fentanyl, 2 mg Midazolam      Specimens: none    Procedural Complications: None    Condition: Stable    Plan: Transport to  for recovery       PROCEDURE  Describe Procedure: Completed image-guided placement of 8 Romansh 27 cm single lumen power-injectable central venous chest port via right external jugular vein. Aspirates and flushes freely, heparin locked and is ready for immediate use.        Length of time physician/provider present for 1:1 monitoring during sedation:  38-52 min (40 min)

## 2024-09-17 NOTE — PROGRESS NOTES
[facility]  Date of visit: 09/18/2024      Reason for Visit: Follow-up for DLBCL germinal cell subtype with lytic lesions      Oncology HPI:  Cara is an 84 year old female with a PMH of pAF and saddle PE 2021 on Xarelto, HLD, HTN, TIA, OA, autoimmune hepatitis (on azathioprine), IgM/IgG kappa MGUS (dx 11/2017, follows with Dr. 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     Patient presented to the ED 7/17/2024 with severe and progressive mid-back pain x1 week without recent falls or injuries. Initial imaging revealed lytic lesion of left T12 transverse process and T11 fracture. She was admitted for pain control and further work-up. Additional imaging (CT aortic survey, MRI thoracolumbar, bone scan) had evidence of widespread bony metastasis on thoracolumbar spine, proximal sacrum, bilateral ischium, ribs, and possibly L femur w/o clear evidence of primary source. She completed an outpatient bone bx 7/30 via neurosurgery.    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 The Ki-67 proliferation index is estimated to be 50-60% .  Echo shows preserved EF of 60 to 65%.  HIV hepatitis B negative.  FISH is pending for Bcl-2 and MYC rearrangement. 8/15/24  PET scan showed numerous scattered hypermetabolic lesions throughout axial skeleton largest on most FDG avid is T11.  There is current extension into spinal canal and 2 neural foramina for T10/T11 and T11/T12.  There is a hypermetabolic lesion on the liver that might be related to lymphoma.  Numerous hypermetabolic lesions in appendicular skeleton including the left humeral head and right mid femur.  Hypermetabolic activity in the short  segment narrowing of sigmoid colon.  This scan was discussed with radiologist.  Due to the fact that PET scan is with CT without contrast, reliable assessment of spinal nerve roots was not done and MRI was recommended.  Patient did not have any weakness or neuropathy or sciatica concerning for nerve root involvement.  She was quite symptomatic from her disease in terms of bone pains and require treatment in time sensitive manner.  Considering patient's age we did not think patient could tolerate concurrent M-R-CHOP.  Majority of patient's disease burden is symptomatic and external compression to nerve root and dural involvement can also be treated by R-CHOP.  Decided to proceed with mini-R-CHOP (C1D1=8/28/24).      Interval history:   Cara presents to clinic for toxicity check prior to C2D1 mini-R-CHOP.  She is doing great and not experiencing any side effects.  She is eating and drinking well.  She had her port placed and had no issues.  She has intermittent hand cramping which was present at her baseline, occurs when holding the phone too long and resolved when applying cold water.  This occurs about once every 6 months. Denies fevers/chills, night sweats, N/V/D/C, bleeding issues, neuropathy, urinary changes, mouth sores, new pains, new lumps/bumps.    Current Outpatient Medications   Medication Sig Dispense Refill    acyclovir (ZOVIRAX) 400 MG tablet Take 1 tablet (400 mg) by mouth every 12 hours. 60 tablet 11    alendronate (FOSAMAX) 70 MG tablet Take 70 mg by mouth every 7 days On Sundays      atorvastatin (LIPITOR) 20 MG tablet Take 20 mg by mouth daily      digoxin (LANOXIN) 125 MCG tablet Take 125 mcg by mouth daily.      hydrochlorothiazide (HYDRODIURIL) 50 MG tablet Take 50 mg by mouth daily      lidocaine-prilocaine (EMLA) 2.5-2.5 % external cream Apply topically daily as needed for moderate pain. 30 g 1    metoprolol tartrate (LOPRESSOR) 25 MG tablet Take 0.5 tablets (12.5 mg) by mouth 2 times daily 60  tablet 0    predniSONE (DELTASONE) 50 MG tablet Take 1 tablet (50 mg) by mouth daily. Take on Days 1 through 5. Take first dose in AM prior to chemotherapy. 10 tablet 5    spironolactone (ALDACTONE) 50 MG tablet Take 50 mg by mouth daily      Vitamin D3 (VITAMIN D-1000 MAX ST) 25 mcg (1000 units) tablet Take 1,000 Units by mouth daily      azaTHIOprine (IMURAN) 50 MG tablet Take 75 mg by mouth daily (Patient not taking: Reported on 9/18/2024)      ondansetron (ZOFRAN) 8 MG tablet Take 1 tablet (8 mg) by mouth every 8 hours as needed for nausea (vomiting). (Patient not taking: Reported on 9/18/2024) 30 tablet 2    oxyCODONE (ROXICODONE) 5 MG tablet Take 1 tablet (5 mg) by mouth every 4 hours as needed for severe pain (Patient not taking: Reported on 9/18/2024) 9 tablet 0    polyethylene glycol (MIRALAX) 17 GM/Dose powder Take 1 Capful by mouth daily as needed for constipation (Patient not taking: Reported on 9/18/2024)      prochlorperazine (COMPAZINE) 10 MG tablet Take 1 tablet (10 mg) by mouth every 6 hours as needed for nausea or vomiting. (Patient not taking: Reported on 9/18/2024) 30 tablet 2       No Known Allergies      Physical Exam:  /86   Pulse 94   Temp 97.7  F (36.5  C) (Oral)   Resp 16   Wt 105.9 kg (233 lb 8 oz)   SpO2 96%   BMI 38.44 kg/m    General: No acute distress, pleasant, well-groomed    HEENT: EOMI, PERRL. Sclerae are anicteric. Oral exam deferred  Lymph: Neck is supple with no lymphadenopathy in the cervical or supraclavicular areas.   Heart: Regular rate and rhythm.   Lungs: Clear to auscultation bilaterally.   Extremities: No lower extremity edema noted bilaterally.   Neuro: Alert and oriented x3, CN grossly intact, steady gait  Skin: No rashes, petechiae, or bruising noted on exposed skin.  Vascular Access: Port    Labs:   Most Recent 3 CBC's:  Recent Labs   Lab Test 09/18/24  1608 09/04/24  1557 08/28/24  1051   WBC 7.5 4.4 7.1   HGB 12.4 13.2 13.1   MCV 91 87 90    314  482*   ANEUTAUTO 5.6 4.0 6.3     Most Recent 3 BMP's:  Recent Labs   Lab Test 09/18/24  1608 09/04/24  1557 08/28/24  1051    136 139   POTASSIUM 4.4 4.5 4.7   CHLORIDE 105 102 106   CO2 23 20* 22   BUN 18.0 24.8* 23.2*   CR 0.79 0.73 0.76   ANIONGAP 9 14 11   TREVOR 9.7 9.7 9.5   * 132* 123*   PROTTOTAL 7.2 7.3 7.4   ALBUMIN 3.8 4.0 3.9    Most Recent 3 LFT's:  Recent Labs   Lab Test 09/18/24  1608 09/04/24  1557 08/28/24  1051   AST 18 17 22   ALT 8 8 9   ALKPHOS 188* 230* 237*   BILITOTAL 0.7 0.9 0.9    Most Recent 2 TSH and T4:No lab results found.  I reviewed the above labs today.    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  - Mini-R-CHOP (C1D1=8/28/24)- plan for total of 6 cycles  - S/p C1 and tolerating treatment well with no noticeable side-effects/toxicities  - Proceed with C2D1 Mini-R-CHOP tomorrow (9/18)   - PET scan after 2 cycles- scheduled for 10/4  - Follow-up with Dr. Yu 10/7    Ppx:  - Acyclovir 400 mg BID  - Neb Pentamidine monthly- next due 9/27    Autoimmune hepatitis B:  -Azathioprine should be discontinued.  Chemotherapy should provide immunosuppression that will help hepatitis and prednisone will help to.   - Encouraged patient and her daughter to discuss with hepatologist.      19 minutes spent on the date of the encounter doing chart review, review of test results, interpretation of tests, patient visit, and documentation     VALENTINA Montenegro [facility] [id number] [place], [place] [id number] [phone]

## 2024-09-18 ENCOUNTER — APPOINTMENT (OUTPATIENT)
Dept: LAB | Facility: CLINIC | Age: 84
End: 2024-09-18
Attending: STUDENT IN AN ORGANIZED HEALTH CARE EDUCATION/TRAINING PROGRAM
Payer: COMMERCIAL

## 2024-09-18 ENCOUNTER — ONCOLOGY VISIT (OUTPATIENT)
Dept: ONCOLOGY | Facility: CLINIC | Age: 84
End: 2024-09-18
Attending: STUDENT IN AN ORGANIZED HEALTH CARE EDUCATION/TRAINING PROGRAM
Payer: COMMERCIAL

## 2024-09-18 VITALS
TEMPERATURE: 97.7 F | BODY MASS INDEX: 38.44 KG/M2 | OXYGEN SATURATION: 96 % | HEART RATE: 94 BPM | DIASTOLIC BLOOD PRESSURE: 86 MMHG | WEIGHT: 233.5 LBS | RESPIRATION RATE: 16 BRPM | SYSTOLIC BLOOD PRESSURE: 119 MMHG

## 2024-09-18 DIAGNOSIS — C83.398 DIFFUSE LARGE B-CELL LYMPHOMA OF EXTRANODAL SITE: Primary | ICD-10-CM

## 2024-09-18 LAB
ALBUMIN SERPL BCG-MCNC: 3.8 G/DL (ref 3.5–5.2)
ALP SERPL-CCNC: 188 U/L (ref 40–150)
ALT SERPL W P-5'-P-CCNC: 8 U/L (ref 0–50)
ANION GAP SERPL CALCULATED.3IONS-SCNC: 9 MMOL/L (ref 7–15)
AST SERPL W P-5'-P-CCNC: 18 U/L (ref 0–45)
BASOPHILS # BLD AUTO: 0 10E3/UL (ref 0–0.2)
BASOPHILS NFR BLD AUTO: 0 %
BILIRUB SERPL-MCNC: 0.7 MG/DL
BUN SERPL-MCNC: 18 MG/DL (ref 8–23)
CALCIUM SERPL-MCNC: 9.7 MG/DL (ref 8.8–10.4)
CHLORIDE SERPL-SCNC: 105 MMOL/L (ref 98–107)
CREAT SERPL-MCNC: 0.79 MG/DL (ref 0.51–0.95)
EGFRCR SERPLBLD CKD-EPI 2021: 74 ML/MIN/1.73M2
EOSINOPHIL # BLD AUTO: 0 10E3/UL (ref 0–0.7)
EOSINOPHIL NFR BLD AUTO: 0 %
ERYTHROCYTE [DISTWIDTH] IN BLOOD BY AUTOMATED COUNT: 18.3 % (ref 10–15)
GLUCOSE SERPL-MCNC: 100 MG/DL (ref 70–99)
HCO3 SERPL-SCNC: 23 MMOL/L (ref 22–29)
HCT VFR BLD AUTO: 38.5 % (ref 35–47)
HGB BLD-MCNC: 12.4 G/DL (ref 11.7–15.7)
IMM GRANULOCYTES # BLD: 0.1 10E3/UL
IMM GRANULOCYTES NFR BLD: 1 %
LYMPHOCYTES # BLD AUTO: 0.9 10E3/UL (ref 0.8–5.3)
LYMPHOCYTES NFR BLD AUTO: 12 %
MCH RBC QN AUTO: 29.2 PG (ref 26.5–33)
MCHC RBC AUTO-ENTMCNC: 32.2 G/DL (ref 31.5–36.5)
MCV RBC AUTO: 91 FL (ref 78–100)
MONOCYTES # BLD AUTO: 0.9 10E3/UL (ref 0–1.3)
MONOCYTES NFR BLD AUTO: 12 %
NEUTROPHILS # BLD AUTO: 5.6 10E3/UL (ref 1.6–8.3)
NEUTROPHILS NFR BLD AUTO: 75 %
NRBC # BLD AUTO: 0 10E3/UL
NRBC BLD AUTO-RTO: 0 /100
PLATELET # BLD AUTO: 328 10E3/UL (ref 150–450)
POTASSIUM SERPL-SCNC: 4.4 MMOL/L (ref 3.4–5.3)
PROT SERPL-MCNC: 7.2 G/DL (ref 6.4–8.3)
RBC # BLD AUTO: 4.24 10E6/UL (ref 3.8–5.2)
SODIUM SERPL-SCNC: 137 MMOL/L (ref 135–145)
WBC # BLD AUTO: 7.5 10E3/UL (ref 4–11)

## 2024-09-18 PROCEDURE — 250N000011 HC RX IP 250 OP 636

## 2024-09-18 PROCEDURE — G0463 HOSPITAL OUTPT CLINIC VISIT: HCPCS

## 2024-09-18 PROCEDURE — 99212 OFFICE O/P EST SF 10 MIN: CPT | Mod: 24

## 2024-09-18 PROCEDURE — 36592 COLLECT BLOOD FROM PICC: CPT

## 2024-09-18 PROCEDURE — 80053 COMPREHEN METABOLIC PANEL: CPT

## 2024-09-18 PROCEDURE — 85025 COMPLETE CBC W/AUTO DIFF WBC: CPT

## 2024-09-18 RX ORDER — PALONOSETRON 0.05 MG/ML
0.25 INJECTION, SOLUTION INTRAVENOUS ONCE
Status: CANCELLED
Start: 2024-09-18

## 2024-09-18 RX ORDER — EPINEPHRINE 1 MG/ML
0.3 INJECTION, SOLUTION INTRAMUSCULAR; SUBCUTANEOUS EVERY 5 MIN PRN
Status: CANCELLED | OUTPATIENT
Start: 2024-09-18

## 2024-09-18 RX ORDER — LIDOCAINE/PRILOCAINE 2.5 %-2.5%
CREAM (GRAM) TOPICAL DAILY PRN
Qty: 30 G | Refills: 1 | Status: SHIPPED | OUTPATIENT
Start: 2024-09-18

## 2024-09-18 RX ORDER — METHYLPREDNISOLONE SODIUM SUCCINATE 125 MG/2ML
125 INJECTION, POWDER, LYOPHILIZED, FOR SOLUTION INTRAMUSCULAR; INTRAVENOUS
Status: CANCELLED
Start: 2024-09-18

## 2024-09-18 RX ORDER — ALBUTEROL SULFATE 90 UG/1
1-2 AEROSOL, METERED RESPIRATORY (INHALATION)
Status: CANCELLED
Start: 2024-09-18

## 2024-09-18 RX ORDER — MEPERIDINE HYDROCHLORIDE 25 MG/ML
25 INJECTION INTRAMUSCULAR; INTRAVENOUS; SUBCUTANEOUS
Status: CANCELLED
Start: 2024-09-18

## 2024-09-18 RX ORDER — DOXORUBICIN HYDROCHLORIDE 2 MG/ML
25 INJECTION, SOLUTION INTRAVENOUS ONCE
Status: CANCELLED | OUTPATIENT
Start: 2024-09-18

## 2024-09-18 RX ORDER — ALBUTEROL SULFATE 0.83 MG/ML
2.5 SOLUTION RESPIRATORY (INHALATION)
Status: CANCELLED | OUTPATIENT
Start: 2024-09-18

## 2024-09-18 RX ORDER — MEPERIDINE HYDROCHLORIDE 25 MG/ML
25 INJECTION INTRAMUSCULAR; INTRAVENOUS; SUBCUTANEOUS EVERY 30 MIN PRN
Status: CANCELLED | OUTPATIENT
Start: 2024-09-18

## 2024-09-18 RX ORDER — HEPARIN SODIUM (PORCINE) LOCK FLUSH IV SOLN 100 UNIT/ML 100 UNIT/ML
5 SOLUTION INTRAVENOUS
Status: CANCELLED | OUTPATIENT
Start: 2024-09-18

## 2024-09-18 RX ORDER — HEPARIN SODIUM,PORCINE 10 UNIT/ML
5-20 VIAL (ML) INTRAVENOUS DAILY PRN
Status: CANCELLED | OUTPATIENT
Start: 2024-09-18

## 2024-09-18 RX ORDER — DIPHENHYDRAMINE HCL 25 MG
50 CAPSULE ORAL ONCE
Status: CANCELLED
Start: 2024-09-18

## 2024-09-18 RX ORDER — DIPHENHYDRAMINE HYDROCHLORIDE 50 MG/ML
50 INJECTION INTRAMUSCULAR; INTRAVENOUS
Status: CANCELLED
Start: 2024-09-18

## 2024-09-18 RX ORDER — LORAZEPAM 2 MG/ML
0.5 INJECTION INTRAMUSCULAR EVERY 4 HOURS PRN
Status: CANCELLED | OUTPATIENT
Start: 2024-09-18

## 2024-09-18 RX ORDER — ACETAMINOPHEN 325 MG/1
650 TABLET ORAL ONCE
Status: CANCELLED
Start: 2024-09-18

## 2024-09-18 RX ORDER — HEPARIN SODIUM (PORCINE) LOCK FLUSH IV SOLN 100 UNIT/ML 100 UNIT/ML
500 SOLUTION INTRAVENOUS ONCE
Status: COMPLETED | OUTPATIENT
Start: 2024-09-18 | End: 2024-09-18

## 2024-09-18 RX ADMIN — Medication 500 UNITS: at 16:10

## 2024-09-18 ASSESSMENT — PAIN SCALES - GENERAL: PAINLEVEL: NO PAIN (0)

## 2024-09-18 NOTE — LETTER
9/18/2024      Cara Cool  [address] [place] [id number]      Dear Colleague,    Thank you for referring your patient, Cara Cool, to the [facility]. Please see a copy of my visit note below.    [facility]  Date of visit: 09/18/2024      Reason for Visit: Follow-up for DLBCL germinal cell subtype with lytic lesions      Oncology HPI:  Cara is an 84 year old female with a PMH of pAF and saddle PE 2021 on Xarelto, HLD, HTN, TIA, OA, autoimmune hepatitis (on azathioprine), IgM/IgG kappa MGUS (dx 11/2017, follows with Dr. 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     Patient presented to the ED 7/17/2024 with severe and progressive mid-back pain x1 week without recent falls or injuries. Initial imaging revealed lytic lesion of left T12 transverse process and T11 fracture. She was admitted for pain control and further work-up. Additional imaging (CT aortic survey, MRI thoracolumbar, bone scan) had evidence of widespread bony metastasis on thoracolumbar spine, proximal sacrum, bilateral ischium, ribs, and possibly L femur w/o clear evidence of primary source. She completed an outpatient bone bx 7/30 via neurosurgery.    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 The Ki-67 proliferation index is estimated to be 50-60% .  Echo shows preserved EF of 60 to 65%.  HIV hepatitis B negative.  FISH is pending for Bcl-2 and MYC rearrangement. 8/15/24  PET scan showed numerous scattered hypermetabolic lesions throughout axial skeleton largest on most FDG avid is T11.  There is current extension into spinal canal and 2 neural foramina for T10/T11 and  T11/T12.  There is a hypermetabolic lesion on the liver that might be related to lymphoma.  Numerous hypermetabolic lesions in appendicular skeleton including the left humeral head and right mid femur.  Hypermetabolic activity in the short segment narrowing of sigmoid colon.  This scan was discussed with radiologist.  Due to the fact that PET scan is with CT without contrast, reliable assessment of spinal nerve roots was not done and MRI was recommended.  Patient did not have any weakness or neuropathy or sciatica concerning for nerve root involvement.  She was quite symptomatic from her disease in terms of bone pains and require treatment in time sensitive manner.  Considering patient's age we did not think patient could tolerate concurrent M-R-CHOP.  Majority of patient's disease burden is symptomatic and external compression to nerve root and dural involvement can also be treated by R-CHOP.  Decided to proceed with mini-R-CHOP (C1D1=8/28/24).      Interval history:   Cara presents to clinic for toxicity check prior to C2D1 mini-R-CHOP.  She is doing great and not experiencing any side effects.  She is eating and drinking well.  She had her port placed and had no issues.  She has intermittent hand cramping which was present at her baseline, occurs when holding the phone too long and resolved when applying cold water.  This occurs about once every 6 months. Denies fevers/chills, night sweats, N/V/D/C, bleeding issues, neuropathy, urinary changes, mouth sores, new pains, new lumps/bumps.    Current Outpatient Medications   Medication Sig Dispense Refill     acyclovir (ZOVIRAX) 400 MG tablet Take 1 tablet (400 mg) by mouth every 12 hours. 60 tablet 11     alendronate (FOSAMAX) 70 MG tablet Take 70 mg by mouth every 7 days On Sundays       atorvastatin (LIPITOR) 20 MG tablet Take 20 mg by mouth daily       digoxin (LANOXIN) 125 MCG tablet Take 125 mcg by mouth daily.       hydrochlorothiazide (HYDRODIURIL) 50 MG  tablet Take 50 mg by mouth daily       lidocaine-prilocaine (EMLA) 2.5-2.5 % external cream Apply topically daily as needed for moderate pain. 30 g 1     metoprolol tartrate (LOPRESSOR) 25 MG tablet Take 0.5 tablets (12.5 mg) by mouth 2 times daily 60 tablet 0     predniSONE (DELTASONE) 50 MG tablet Take 1 tablet (50 mg) by mouth daily. Take on Days 1 through 5. Take first dose in AM prior to chemotherapy. 10 tablet 5     spironolactone (ALDACTONE) 50 MG tablet Take 50 mg by mouth daily       Vitamin D3 (VITAMIN D-1000 MAX ST) 25 mcg (1000 units) tablet Take 1,000 Units by mouth daily       azaTHIOprine (IMURAN) 50 MG tablet Take 75 mg by mouth daily (Patient not taking: Reported on 9/18/2024)       ondansetron (ZOFRAN) 8 MG tablet Take 1 tablet (8 mg) by mouth every 8 hours as needed for nausea (vomiting). (Patient not taking: Reported on 9/18/2024) 30 tablet 2     oxyCODONE (ROXICODONE) 5 MG tablet Take 1 tablet (5 mg) by mouth every 4 hours as needed for severe pain (Patient not taking: Reported on 9/18/2024) 9 tablet 0     polyethylene glycol (MIRALAX) 17 GM/Dose powder Take 1 Capful by mouth daily as needed for constipation (Patient not taking: Reported on 9/18/2024)       prochlorperazine (COMPAZINE) 10 MG tablet Take 1 tablet (10 mg) by mouth every 6 hours as needed for nausea or vomiting. (Patient not taking: Reported on 9/18/2024) 30 tablet 2       No Known Allergies      Physical Exam:  /86   Pulse 94   Temp 97.7  F (36.5  C) (Oral)   Resp 16   Wt 105.9 kg (233 lb 8 oz)   SpO2 96%   BMI 38.44 kg/m    General: No acute distress, pleasant, well-groomed    HEENT: EOMI, PERRL. Sclerae are anicteric. Oral exam deferred  Lymph: Neck is supple with no lymphadenopathy in the cervical or supraclavicular areas.   Heart: Regular rate and rhythm.   Lungs: Clear to auscultation bilaterally.   Extremities: No lower extremity edema noted bilaterally.   Neuro: Alert and oriented x3, CN grossly intact, steady  gait  Skin: No rashes, petechiae, or bruising noted on exposed skin.  Vascular Access: Port    Labs:   Most Recent 3 CBC's:  Recent Labs   Lab Test 09/18/24  1608 09/04/24  1557 08/28/24  1051   WBC 7.5 4.4 7.1   HGB 12.4 13.2 13.1   MCV 91 87 90    314 482*   ANEUTAUTO 5.6 4.0 6.3     Most Recent 3 BMP's:  Recent Labs   Lab Test 09/18/24  1608 09/04/24  1557 08/28/24  1051    136 139   POTASSIUM 4.4 4.5 4.7   CHLORIDE 105 102 106   CO2 23 20* 22   BUN 18.0 24.8* 23.2*   CR 0.79 0.73 0.76   ANIONGAP 9 14 11   TREVOR 9.7 9.7 9.5   * 132* 123*   PROTTOTAL 7.2 7.3 7.4   ALBUMIN 3.8 4.0 3.9    Most Recent 3 LFT's:  Recent Labs   Lab Test 09/18/24  1608 09/04/24  1557 08/28/24  1051   AST 18 17 22   ALT 8 8 9   ALKPHOS 188* 230* 237*   BILITOTAL 0.7 0.9 0.9    Most Recent 2 TSH and T4:No lab results found.  I reviewed the above labs today.    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  - Mini-R-CHOP (C1D1=8/28/24)- plan for total of 6 cycles  - S/p C1 and tolerating treatment well with no noticeable side-effects/toxicities  - Proceed with C2D1 Mini-R-CHOP tomorrow (9/18)   - PET scan after 2 cycles- scheduled for 10/4  - Follow-up with Dr. Yu 10/7    Ppx:  - Acyclovir 400 mg BID  - Neb Pentamidine monthly- next due 9/27    Autoimmune hepatitis B:  -Azathioprine should be discontinued.  Chemotherapy should provide immunosuppression that will help hepatitis and prednisone will help to.   - Encouraged patient and her daughter to discuss with hepatologist.      19 minutes spent on the date of the encounter doing chart review, review of test results, interpretation of tests, patient visit, and documentation     VALENTINA Montenegro CNP  [facility]  [address] [place] [address] [phone]      Again, thank you for allowing me to participate in  the care of your patient.        Sincerely,        VALENTINA Montenegro CNP

## 2024-09-18 NOTE — NURSING NOTE
"Oncology Rooming Note    September 18, 2024 4:27 PM   Cara Cool is a 84 year old female who presents for:    Chief Complaint   Patient presents with    Port Draw     Labs drawn via port by RN in lab.  VS taken    Oncology Clinic Visit     Diffuse large B-cell lymphoma of extranodal site       Initial Vitals: /86   Pulse 94   Temp 97.7  F (36.5  C) (Oral)   Resp 16   Wt 105.9 kg (233 lb 8 oz)   SpO2 96%   BMI 38.44 kg/m   Estimated body mass index is 38.44 kg/m  as calculated from the following:    Height as of 8/28/24: 1.66 m (5' 5.35\").    Weight as of this encounter: 105.9 kg (233 lb 8 oz). Body surface area is 2.21 meters squared.  No Pain (0) Comment: Data Unavailable   No LMP recorded. Patient is postmenopausal.  Allergies reviewed: Yes  Medications reviewed: Yes    Medications: Medication refills not needed today.  [facility] into [email or web address] [address]    Frailty Screening:   Is the patient here for a new oncology consult visit in cancer care? 2. No      Clinical concerns: none      Jameel Lou LPN            "

## 2024-09-18 NOTE — NURSING NOTE
"Chief Complaint   Patient presents with    Port Draw     Labs drawn via port by RN in lab.  VS taken       Port accessed with 20 gauge 3/4\" non power needle by RN, labs collected, line flushed with saline and heparin, then de-accessed.  Vitals taken. Pt checked in for appointment(s).    Ginna Dillard, RN    "

## 2024-09-19 ENCOUNTER — INFUSION THERAPY VISIT (OUTPATIENT)
Dept: ONCOLOGY | Facility: CLINIC | Age: 84
End: 2024-09-19
Attending: STUDENT IN AN ORGANIZED HEALTH CARE EDUCATION/TRAINING PROGRAM
Payer: COMMERCIAL

## 2024-09-19 VITALS
RESPIRATION RATE: 16 BRPM | OXYGEN SATURATION: 100 % | SYSTOLIC BLOOD PRESSURE: 129 MMHG | DIASTOLIC BLOOD PRESSURE: 85 MMHG | HEART RATE: 88 BPM | TEMPERATURE: 98.1 F

## 2024-09-19 DIAGNOSIS — Z51.11 ENCOUNTER FOR ANTINEOPLASTIC CHEMOTHERAPY: ICD-10-CM

## 2024-09-19 DIAGNOSIS — C83.398 DIFFUSE LARGE B-CELL LYMPHOMA OF EXTRANODAL SITE: Primary | ICD-10-CM

## 2024-09-19 LAB — INR PPP: 1.59 (ref 0.85–1.15)

## 2024-09-19 PROCEDURE — 96415 CHEMO IV INFUSION ADDL HR: CPT

## 2024-09-19 PROCEDURE — 36415 COLL VENOUS BLD VENIPUNCTURE: CPT

## 2024-09-19 PROCEDURE — 96411 CHEMO IV PUSH ADDL DRUG: CPT

## 2024-09-19 PROCEDURE — 96417 CHEMO IV INFUS EACH ADDL SEQ: CPT

## 2024-09-19 PROCEDURE — 96372 THER/PROPH/DIAG INJ SC/IM: CPT

## 2024-09-19 PROCEDURE — 96367 TX/PROPH/DG ADDL SEQ IV INF: CPT

## 2024-09-19 PROCEDURE — 250N000013 HC RX MED GY IP 250 OP 250 PS 637

## 2024-09-19 PROCEDURE — 250N000012 HC RX MED GY IP 250 OP 636 PS 637: Performed by: STUDENT IN AN ORGANIZED HEALTH CARE EDUCATION/TRAINING PROGRAM

## 2024-09-19 PROCEDURE — 36591 DRAW BLOOD OFF VENOUS DEVICE: CPT

## 2024-09-19 PROCEDURE — 85610 PROTHROMBIN TIME: CPT

## 2024-09-19 PROCEDURE — 250N000011 HC RX IP 250 OP 636: Mod: JW

## 2024-09-19 PROCEDURE — 258N000003 HC RX IP 258 OP 636

## 2024-09-19 PROCEDURE — 96375 TX/PRO/DX INJ NEW DRUG ADDON: CPT

## 2024-09-19 PROCEDURE — 96377 APPLICATON ON-BODY INJECTOR: CPT | Mod: 59

## 2024-09-19 PROCEDURE — 96413 CHEMO IV INFUSION 1 HR: CPT

## 2024-09-19 RX ORDER — DIPHENHYDRAMINE HCL 25 MG
50 CAPSULE ORAL ONCE
Status: COMPLETED | OUTPATIENT
Start: 2024-09-19 | End: 2024-09-19

## 2024-09-19 RX ORDER — HEPARIN SODIUM (PORCINE) LOCK FLUSH IV SOLN 100 UNIT/ML 100 UNIT/ML
5 SOLUTION INTRAVENOUS
Status: DISCONTINUED | OUTPATIENT
Start: 2024-09-19 | End: 2024-09-19 | Stop reason: HOSPADM

## 2024-09-19 RX ORDER — PALONOSETRON 0.05 MG/ML
0.25 INJECTION, SOLUTION INTRAVENOUS ONCE
Status: COMPLETED | OUTPATIENT
Start: 2024-09-19 | End: 2024-09-19

## 2024-09-19 RX ORDER — PREDNISONE 50 MG/1
50 TABLET ORAL ONCE
Status: COMPLETED | OUTPATIENT
Start: 2024-09-19 | End: 2024-09-19

## 2024-09-19 RX ORDER — DOXORUBICIN HYDROCHLORIDE 2 MG/ML
25 INJECTION, SOLUTION INTRAVENOUS ONCE
Status: COMPLETED | OUTPATIENT
Start: 2024-09-19 | End: 2024-09-19

## 2024-09-19 RX ORDER — ACETAMINOPHEN 325 MG/1
650 TABLET ORAL ONCE
Status: COMPLETED | OUTPATIENT
Start: 2024-09-19 | End: 2024-09-19

## 2024-09-19 RX ADMIN — PEGFILGRASTIM 6 MG: KIT SUBCUTANEOUS at 14:15

## 2024-09-19 RX ADMIN — DOXORUBICIN HYDROCHLORIDE 60 MG: 2 INJECTION, SOLUTION INTRAVENOUS at 11:51

## 2024-09-19 RX ADMIN — VINCRISTINE SULFATE 1 MG: 1 INJECTION, SOLUTION INTRAVENOUS at 11:59

## 2024-09-19 RX ADMIN — DIPHENHYDRAMINE HYDROCHLORIDE 50 MG: 25 CAPSULE ORAL at 11:49

## 2024-09-19 RX ADMIN — ACETAMINOPHEN 650 MG: 325 TABLET ORAL at 11:49

## 2024-09-19 RX ADMIN — Medication 5 ML: at 14:13

## 2024-09-19 RX ADMIN — FOSAPREPITANT 150 MG: 150 INJECTION, POWDER, LYOPHILIZED, FOR SOLUTION INTRAVENOUS at 11:24

## 2024-09-19 RX ADMIN — PALONOSETRON HYDROCHLORIDE 0.25 MG: 0.25 INJECTION INTRAVENOUS at 11:22

## 2024-09-19 RX ADMIN — SODIUM CHLORIDE 250 ML: 9 INJECTION, SOLUTION INTRAVENOUS at 11:19

## 2024-09-19 RX ADMIN — RITUXIMAB-ABBS 800 MG: 10 INJECTION, SOLUTION INTRAVENOUS at 12:40

## 2024-09-19 RX ADMIN — PREDNISONE 50 MG: 50 TABLET ORAL at 11:49

## 2024-09-19 RX ADMIN — CYCLOPHOSPHAMIDE 890 MG: 1 INJECTION, POWDER, FOR SOLUTION INTRAVENOUS; ORAL at 12:04

## 2024-09-19 ASSESSMENT — PAIN SCALES - GENERAL: PAINLEVEL: NO PAIN (0)

## 2024-09-19 NOTE — PROGRESS NOTES
Infusion Nursing Note:  Cara Cool presents today for cycle 2 day 1 Adriamycin, Vincristine, Cytoxan, Rituxan, Neulasta on body.    Patient seen by provider today: No   present during visit today: Not Applicable.    Note: Patient arrives feeling well.  She was assessed by Hellen Donato NP yesterday, 9/18 and reports no changes overnight.    Patient did not take her oral prednisone at home today and did not bring it with her. Per PharmD, check with provider to see if she should take a one time dose here today or wait until she gets home to take.    TORB: Dr. Yu/Khushboo Klipatrick RN  -she should take the prednisone here today    Neulasta On Pro- On Body injector applied to patient today on right upper abdomen at 2:20 pm with light facing up. Writer discussed Neulasta injection would start tomorrow at 5:20 pm approximately 27 hours after application applied today.  Written and Verbal instruction reviewed with patient.  Pt instructed when the dose delivery starts, it will take about 45 minutes to complete. Pt aware Neulasta Onpro On-Body should have green flashing light and to call triage or on-call MD if injector flashes red or appears to be leaking. Pt aware to keep Onpro On-Body Neualsta 4 inches away from electrical equipment and to avoid showering 4 hours prior to injection.           Intravenous Access:  Implanted Port.    Treatment Conditions:  Lab Results   Component Value Date    HGB 12.4 09/18/2024    WBC 7.5 09/18/2024    ANEU 3.3 09/28/2021    ANEUTAUTO 5.6 09/18/2024     09/18/2024        Lab Results   Component Value Date     09/18/2024    POTASSIUM 4.4 09/18/2024    MAG 1.7 07/17/2024    CR 0.79 09/18/2024    TREVOR 9.7 09/18/2024    BILITOTAL 0.7 09/18/2024    ALBUMIN 3.8 09/18/2024    ALT 8 09/18/2024    AST 18 09/18/2024       Results reviewed, labs MET treatment parameters, ok to proceed with treatment.  ECHO/MUGA completed 7/18/24  EF >70%.      Post Infusion  Assessment:  Patient tolerated infusion without incident.  Blood return noted pre and post infusion.  Blood return noted every 2 ml during adriamycin administration.   Site patent and intact, free from redness, edema or discomfort.  No evidence of extravasations.  Access discontinued per protocol.       Discharge Plan:   Patient declined prescription refills.  Discharge instructions reviewed with: Patient.  Patient and/or family verbalized understanding of discharge instructions and all questions answered.  AVS to patient via [employer].  Patient will return 10/11/24 for next appointment.   Patient discharged in stable condition accompanied by: self.  Departure Mode: Ambulatory.      Khushboo Kilpatrick RN

## 2024-09-19 NOTE — PATIENT INSTRUCTIONS
Neulasta injection will start on Friday at 5:20 pm approximately 27 hours after application applied today.    When the dose delivery starts, it will take about 45 minutes to complete.  Neulasta Onpro On-Body should have green flashing light.  Call triage or on-call MD if injector flashes red or appears to be leaking.   Keep Onpro On-Body Neulasta 4 inches away from electrical equipment.  Avoid showering 4 hours prior to injection.       [facility] Triage and after hours / weekends / holidays:  [phone]    Please call the triage or after hours line if you experience a temperature greater than or equal to 100.4, shaking chills, have uncontrolled nausea, vomiting and/or diarrhea, dizziness, shortness of breath, chest pain, bleeding, unexplained bruising, or if you have any other new/concerning symptoms, questions or concerns.      If you are having any concerning symptoms or wish to speak to a provider before your next infusion visit, please call triage to notify them so we can adequately serve you.     If you need a refill on a narcotic prescription or other medication, please call before your infusion appointment.                September 2024 Sunday Monday Tuesday Wednesday Thursday Friday Saturday   1     2     3     4    LAB PERIPHERAL   3:45 PM   (15 min.)   [email or web address]   [facility]    RETURN CCSL   4:00 PM   (45 min.)   Hellen Donato APRN CNP   [facility] 5     6     7       8     9     10     11     12     13     14       15     16    Admission  10:47 AM   Rickie Eden Chi, PA-C   [facility] Unit [address] [place] [address]   (Discharge: 9/16/2024)    PROCEDURE - 3.5 HR  11:00 AM   (210 min.)   U2A ROOM 15   [facility] Unit 2A [place]    IR CHEST PORT PLACEMENT >5 YRS  11:00 AM   (90 min.)   UUIR2   [facility] Interventional Radiology 17     18    LAB PERIPHERAL   3:45 PM   (15 min.)   [facility] DRAW     [facility]    RETURN CCSL   4:00 PM   (45 min.)   Hellen Donato APRN CNP   [facility] 19    ONC INFUSION 5 HR (300 MIN)   9:30 AM   (300 min.)   UC ONC INFUSION NURSE   [facility] 20     21       22     23     24     25     26     27     28       29     30                                          October 2024 Sunday Monday Tuesday Wednesday Thursday Friday Saturday             1     2     3     4    PET ONCOLOGY WHOLE BODY   1:00 PM   (30 min.)   [id number]   [facility] 5       6     7    PENTAMADINE NEB   2:45 PM   (60 min.)   UC PFL PENT   [facility] Pulmonary Function Testing [place]    RETURN CCSL   4:15 PM   (30 min.)   Darya Yu MD   [facility] 8     9     10     11    LAB PERIPHERAL  10:00 AM   (15 min.)    [facility] DRAW   [facility]    ONC INFUSION 5 HR (300 MIN)  10:30 AM   (300 min.)   UC ONC INFUSION NURSE   [facility] 12       13     14    NEW CARDIOLOGY   3:45 PM   (30 min.)   Renea Kirk MD   [facility] 15     16     17     18     19       20     21     22     23     24     25     26       27     28     29     30     31    LAB PERIPHERAL   8:30 AM   (15 min.)   [facility]   [facility]    RETURN ACTIVE TREATMENT   8:45 AM   (45 min.)   Tania Coburn APRN CNP   [facility]    ONC INFUSION 5 HR (300 MIN)  11:30 AM   (300 min.)    ONC INFUSION NURSE   [facility]                           Lab Results:  Recent Results (from the past 12 hour(s))   INR    Collection Time: 09/19/24 10:33 AM   Result Value Ref Range    INR 1.59 (H) 0.85 - 1.15

## 2024-09-27 ENCOUNTER — NURSE TRIAGE (OUTPATIENT)
Dept: NURSING | Facility: CLINIC | Age: 84
End: 2024-09-27
Payer: COMMERCIAL

## 2024-09-27 NOTE — TELEPHONE ENCOUNTER
Nurse Triage SBAR    Is this a 2nd Level Triage? NO    Situation: Medication question    Background:   Pt. States she forgot to take Prednisone at home in AM prior to her Chemo. Infusion on 9/19/24. The clinic gave her the 1st dose prior to the infusion.    She continued to take Prednisone as directed on bottle until 9/24/24 (Day 5)1 tablet by mouth daily:       Assessment:   -Upon chart review did not find in MD note reference to Prednisone instructions but med was prescribed on 9/4/24, Advised pt.   -However current RX Prednisone instructions: state and verify what pt. Did:        Protocol Recommended Disposition:   Call PCP When Office is Open, Home Care - Information or Advice Only Call    Recommendation: Care advice reviewed. Patient verbalized understanding and agreed to follow care advice given.     However just to be sure, pt. Should Followup with [facility] message to clinic or call when office is open on Monday, to ensure she followed the bottle directions correctly,  Taking Prednisone until Day 5 which was on 9/24/24.         Reason for Disposition   [1] Caller has NON-URGENT medicine question about med that PCP prescribed AND [2] triager unable to answer question    Additional Information   Negative: [1] Intentional drug overdose AND [2] suicidal thoughts or ideas   Negative: MORE THAN A DOUBLE DOSE of a prescription or over-the-counter (OTC) drug   Negative: [1] DOUBLE DOSE (an extra dose or lesser amount) of prescription drug AND [2] any symptoms (e.g., dizziness, nausea, pain, sleepiness)   Negative: [1] DOUBLE DOSE (an extra dose or lesser amount) of over-the-counter (OTC) drug AND [2] any symptoms (e.g., dizziness, nausea, pain, sleepiness)   Negative: Took another person's prescription drug   Negative: [1] DOUBLE DOSE (an extra dose or lesser amount) of prescription drug AND [2] NO symptoms  (Exception: A double dose of antibiotics.)   Negative: Diabetes drug error or overdose (e.g., took wrong type  of insulin or took extra dose)   Negative: [1] Prescription not at pharmacy AND [2] was prescribed by PCP recently (Exception: Triager has access to EMR and prescription is recorded there. Go to Home Care and confirm for pharmacy.)   Negative: [1] Pharmacy calling with prescription question AND [2] triager unable to answer question   Negative: [1] Caller has URGENT medicine question about med that PCP or specialist prescribed AND [2] triager unable to answer question   Negative: Medicine patch causing local rash or itching   Negative: [1] Caller has medicine question about med NOT prescribed by PCP AND [2] triager unable to answer question (e.g., compatibility with other med, storage)   Negative: Prescription request for new medicine (not a refill)    Protocols used: Medication Question Call-A-  Fay Burch RN, Triage Nurse Advisor, 9/27/2024 6:12 PM

## 2024-10-04 ENCOUNTER — HOSPITAL ENCOUNTER (OUTPATIENT)
Dept: PET IMAGING | Facility: CLINIC | Age: 84
Discharge: HOME | End: 2024-10-04
Attending: STUDENT IN AN ORGANIZED HEALTH CARE EDUCATION/TRAINING PROGRAM | Admitting: STUDENT IN AN ORGANIZED HEALTH CARE EDUCATION/TRAINING PROGRAM
Payer: COMMERCIAL

## 2024-10-04 DIAGNOSIS — C83.398 DIFFUSE LARGE B-CELL LYMPHOMA OF EXTRANODAL SITE: ICD-10-CM

## 2024-10-04 PROCEDURE — 343N000001 HC RX 343 MED OP 636: Performed by: STUDENT IN AN ORGANIZED HEALTH CARE EDUCATION/TRAINING PROGRAM

## 2024-10-04 PROCEDURE — 78816 PET IMAGE W/CT FULL BODY: CPT | Mod: 26 | Performed by: RADIOLOGY

## 2024-10-04 PROCEDURE — A9552 F18 FDG: HCPCS | Performed by: STUDENT IN AN ORGANIZED HEALTH CARE EDUCATION/TRAINING PROGRAM

## 2024-10-04 PROCEDURE — 78816 PET IMAGE W/CT FULL BODY: CPT | Mod: PS

## 2024-10-04 RX ORDER — HEPARIN SODIUM (PORCINE) LOCK FLUSH IV SOLN 100 UNIT/ML 100 UNIT/ML
500 SOLUTION INTRAVENOUS ONCE
Status: COMPLETED | OUTPATIENT
Start: 2024-10-04 | End: 2024-10-04

## 2024-10-04 RX ORDER — FLUDEOXYGLUCOSE F 18 200 MCI/ML
10-18 INJECTION, SOLUTION INTRAVENOUS ONCE
Status: COMPLETED | OUTPATIENT
Start: 2024-10-04 | End: 2024-10-04

## 2024-10-04 RX ADMIN — FLUDEOXYGLUCOSE F 18 14.01 MILLICURIE: 200 INJECTION, SOLUTION INTRAVENOUS at 13:27

## 2024-10-04 RX ADMIN — HEPARIN SODIUM (PORCINE) LOCK FLUSH IV SOLN 100 UNIT/ML 500 UNITS: 100 SOLUTION at 13:26

## 2024-10-07 ENCOUNTER — ONCOLOGY VISIT (OUTPATIENT)
Dept: ONCOLOGY | Facility: CLINIC | Age: 84
End: 2024-10-07
Attending: STUDENT IN AN ORGANIZED HEALTH CARE EDUCATION/TRAINING PROGRAM
Payer: COMMERCIAL

## 2024-10-07 VITALS
SYSTOLIC BLOOD PRESSURE: 126 MMHG | BODY MASS INDEX: 38.85 KG/M2 | DIASTOLIC BLOOD PRESSURE: 82 MMHG | RESPIRATION RATE: 18 BRPM | TEMPERATURE: 98 F | OXYGEN SATURATION: 97 % | WEIGHT: 236 LBS | HEART RATE: 74 BPM

## 2024-10-07 DIAGNOSIS — C83.398 DIFFUSE LARGE B-CELL LYMPHOMA OF EXTRANODAL SITE: ICD-10-CM

## 2024-10-07 DIAGNOSIS — Z51.11 ENCOUNTER FOR ANTINEOPLASTIC CHEMOTHERAPY: ICD-10-CM

## 2024-10-07 DIAGNOSIS — C83.398 DIFFUSE LARGE B-CELL LYMPHOMA OF EXTRANODAL SITE: Primary | ICD-10-CM

## 2024-10-07 PROCEDURE — 99215 OFFICE O/P EST HI 40 MIN: CPT | Performed by: STUDENT IN AN ORGANIZED HEALTH CARE EDUCATION/TRAINING PROGRAM

## 2024-10-07 PROCEDURE — 94642 AEROSOL INHALATION TREATMENT: CPT | Performed by: INTERNAL MEDICINE

## 2024-10-07 PROCEDURE — 94640 AIRWAY INHALATION TREATMENT: CPT | Performed by: INTERNAL MEDICINE

## 2024-10-07 PROCEDURE — G0463 HOSPITAL OUTPT CLINIC VISIT: HCPCS | Performed by: STUDENT IN AN ORGANIZED HEALTH CARE EDUCATION/TRAINING PROGRAM

## 2024-10-07 RX ORDER — DIPHENHYDRAMINE HYDROCHLORIDE 50 MG/ML
50 INJECTION INTRAMUSCULAR; INTRAVENOUS
Status: CANCELLED
Start: 2024-10-25

## 2024-10-07 RX ORDER — PENTAMIDINE ISETHIONATE 300 MG/300MG
300 INHALANT RESPIRATORY (INHALATION) ONCE
Status: COMPLETED | OUTPATIENT
Start: 2024-10-07 | End: 2024-10-07

## 2024-10-07 RX ORDER — MEPERIDINE HYDROCHLORIDE 25 MG/ML
25 INJECTION INTRAMUSCULAR; INTRAVENOUS; SUBCUTANEOUS EVERY 30 MIN PRN
Status: CANCELLED | OUTPATIENT
Start: 2024-10-25

## 2024-10-07 RX ORDER — ALBUTEROL SULFATE 0.83 MG/ML
2.5 SOLUTION RESPIRATORY (INHALATION)
Status: CANCELLED | OUTPATIENT
Start: 2024-10-25

## 2024-10-07 RX ORDER — EPINEPHRINE 1 MG/ML
0.3 INJECTION, SOLUTION, CONCENTRATE INTRAVENOUS EVERY 5 MIN PRN
Status: CANCELLED | OUTPATIENT
Start: 2024-10-25

## 2024-10-07 RX ORDER — ALBUTEROL SULFATE 0.83 MG/ML
2.5 SOLUTION RESPIRATORY (INHALATION) ONCE
Status: CANCELLED
Start: 2024-10-25 | End: 2024-10-25

## 2024-10-07 RX ORDER — ALBUTEROL SULFATE 0.83 MG/ML
2.5 SOLUTION RESPIRATORY (INHALATION) ONCE
Status: COMPLETED | OUTPATIENT
Start: 2024-10-07 | End: 2024-10-07

## 2024-10-07 RX ORDER — ALBUTEROL SULFATE 90 UG/1
1-2 INHALANT RESPIRATORY (INHALATION)
Status: CANCELLED
Start: 2024-10-25

## 2024-10-07 RX ORDER — ACYCLOVIR 400 MG/1
400 TABLET ORAL EVERY 12 HOURS
Qty: 60 TABLET | Refills: 11 | Status: SHIPPED | OUTPATIENT
Start: 2024-10-07

## 2024-10-07 RX ORDER — PENTAMIDINE ISETHIONATE 300 MG/300MG
300 INHALANT RESPIRATORY (INHALATION) ONCE
Status: CANCELLED
Start: 2024-10-25 | End: 2024-10-25

## 2024-10-07 RX ORDER — METHYLPREDNISOLONE SODIUM SUCCINATE 125 MG/2ML
125 INJECTION INTRAMUSCULAR; INTRAVENOUS
Status: CANCELLED
Start: 2024-10-25

## 2024-10-07 RX ADMIN — ALBUTEROL SULFATE 2.5 MG: 0.83 SOLUTION RESPIRATORY (INHALATION) at 15:12

## 2024-10-07 RX ADMIN — PENTAMIDINE ISETHIONATE 300 MG: 300 INHALANT RESPIRATORY (INHALATION) at 15:16

## 2024-10-07 ASSESSMENT — PAIN SCALES - GENERAL: PAINLEVEL: NO PAIN (0)

## 2024-10-07 NOTE — LETTER
10/7/2024      Cara Cool  [address] [place] [id number]      Dear Colleague,    Thank you for referring your patient, Cara Cool, to the [facility]. Please see a copy of my visit note below.    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     Patient presented to the ED 7/17/2024 with severe and progressive mid-back pain x1 week without recent falls or injuries. Initial imaging revealed lytic lesion of left T12 transverse process and T11 fracture. She was admitted for pain control and further work-up. Additional imaging (CT aortic survey, MRI thoracolumbar, bone scan) had evidence of widespread bony metastasis on thoracolumbar spine, proximal sacrum, bilateral ischium, ribs, and possibly L femur w/o clear evidence of primary source. She completed an outpatient bone bx 7/30 via neurosurgery.    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 The Ki-67 proliferation index is estimated to be 50-60% .  Echo shows preserved EF of 60 to 65%.  HIV hepatitis B negative.  FISH is pending for Bcl-2 and MYC rearrangement.  PET scan showsNumerous scattered hypermetabolic lesions throughout axial skeleton largest on most FDG avid is T11.  There is current extension into spinal canal and 2 neural foramina for T10/T11 and  T11/T12.  There is a hypermetabolic lesion on the liver that might be related to lymphoma.  Numerous hypermetabolic lesions in appendicular skeleton including the left humeral head and right mid femur.  Hypermetabolic activity in the short segment narrowing of sigmoid colon.  This scan was discussed with radiologist.  Due to the fact that PET scan is with CT without contrast, reliable assessment of spinal nerve roots was not done and MRI was recommended.  Patient did not have any weakness or neuropathy or sciatica concerning for nerve root involvement.  Patient is quite symptomatic from her disease in terms of bone pains and require treatment in time sensitive manner.  Considering patient's age I do not think patient can tolerate concurrent M R-CHOP.  Majority of patient's disease burden is symptomatic and external compression to nerve root and dural involvement can also be treated by R-CHOP.  So I elected to proceed with R mini CHOP therapy.      She received 2 cycles, tolerated it well. 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 No fevers, chills, night sweats or weight loss, no lymphadenopathy. No pain.        Objective   /82 (BP Location: Right arm, Patient Position: Sitting, Cuff Size: Adult Large)   Pulse 74   Temp 98  F (36.7  C) (Oral)   Resp 18   Wt 107 kg (236 lb)   SpO2 97%   BMI 38.85 kg/m    Body mass index is 38.85 kg/m .  Physical Exam   GENERAL:  Alert oriented well nourished  HEAD: normocephalic atraumatic  SKIN:  no rash, hives, other lesions.  LYMPHATIC: no abnormal lymph nodes palable in cervical, axillary,  supraclavicular or inguinal area.  RESP:  No rales or rhonchi, breath sounds bilaterally equal and vesicular  CV:  No tachycardia, S1 S2 normal No murmur.  GI:  Abdomen soft nontender no hepato or splenomegaly  MUSCULOSKELETAL:  No visible joint redness or swelling.  NEURO:  No gross weakness gait normal  PSYCH: pleasant affect    Labs: I personally reviewed complete blood count, differential, renal function test, liver function tests LDH.  No cytopenias, LFTs within normal limits, renal function test within normal limits and LDH is normal as well.    Imaging: I personally reviewed PETCT neck/chest/abdomen/pelvis and discussed with the patient.  Complete response to treatment so far.    Assessment and Plan:    1) Diffuse large B cell lymphoma:  - She is tolerating treatment very well. She has excellent response to chemotherapy  so far. We will continue treatment for total 6 cycles.  - No major toxicities of chemotherapy. Continue to monitor.    Total time spent on date of service in review of medical records, review of labs, history taking, physical exam, discussion of assessment and plan, counseling and patient education is 40 minutes.    Darya Yu MD  Attending Physician  Pager [phone]              Signed Electronically by: Darya Yu MD        Again, thank you for allowing me to participate in the care of your patient.        Sincerely,        Darya Yu MD

## 2024-10-07 NOTE — PROGRESS NOTES
Patient, Cara Cool , was seen today for a Pentamidine nebulizer tx ordered by Dr. Darya Yu.     Patient was first given 2.5 mg / 3 ml albuterol nebulizer, after which Pentamidine 300 mg  mixed with 6cc Sterile H20 was administered through a filtered nebulizer.     Pre-treatment: SpO2 = 97%   HR = 78   BBS = clear    Post-treatment: SpO2 = 95%  HR =  81   BBS = clear     No adverse side effects noted by the patient.     This service today was provided today with Dr Jt Min available if needed.      Procedure was completed by DYLAN Rodriguez.

## 2024-10-07 NOTE — NURSING NOTE
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    Weight as of this encounter: 107 kg (236 lb). Body surface area is 2.22 meters squared.  No Pain (0) Comment: Data Unavailable   No LMP recorded. Patient is postmenopausal.  Allergies reviewed: Yes  Medications reviewed: Yes    Medications: Medication refills not needed today.  [facility] [employer] [address]    Frailty Screening:   Is the patient here for a new oncology consult visit in cancer care? 2. No      Clinical concerns: Patient noticing some discoloration on thumb nails.   Dr. Valente  was notified.      Khushboo Rodriguez              "

## 2024-10-07 NOTE — PROGRESS NOTES
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 Partha Sherman at [facility]), papillary transitional renal cell carcinoma (s/p cryoablation 6/7/2018), hyperparathyroidism s/p adenoma removal 12/2019, coming in for bx positive DLBCL germinal cell subtype with lytic lesions.     Patient presented to the ED 7/17/2024 with severe and progressive mid-back pain x1 week without recent falls or injuries. Initial imaging revealed lytic lesion of left T12 transverse process and T11 fracture. She was admitted for pain control and further work-up. Additional imaging (CT aortic survey, MRI thoracolumbar, bone scan) had evidence of widespread bony metastasis on thoracolumbar spine, proximal sacrum, bilateral ischium, ribs, and possibly L femur w/o clear evidence of primary source. She completed an outpatient bone bx 7/30 via neurosurgery.    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 The Ki-67 proliferation index is estimated to be 50-60% .  Echo shows preserved EF of 60 to 65%.  HIV hepatitis B negative.  FISH is pending for Bcl-2 and MYC rearrangement.  PET scan showsNumerous scattered hypermetabolic lesions throughout axial skeleton largest on most FDG avid is T11.  There is current extension into spinal canal and 2 neural foramina for T10/T11 and T11/T12.  There is a hypermetabolic lesion on the liver that might be related to lymphoma.  Numerous hypermetabolic lesions in appendicular skeleton including the left humeral head and right mid femur.  Hypermetabolic activity in the short  segment narrowing of sigmoid colon.  This scan was discussed with radiologist.  Due to the fact that PET scan is with CT without contrast, reliable assessment of spinal nerve roots was not done and MRI was recommended.  Patient did not have any weakness or neuropathy or sciatica concerning for nerve root involvement.  Patient is quite symptomatic from her disease in terms of bone pains and require treatment in time sensitive manner.  Considering patient's age I do not think patient can tolerate concurrent M R-CHOP.  Majority of patient's disease burden is symptomatic and external compression to nerve root and dural involvement can also be treated by R-CHOP.  So I elected to proceed with R mini CHOP therapy.      She received 2 cycles, tolerated it well. 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 No fevers, chills, night sweats or weight loss, no lymphadenopathy. No pain.        Objective    /82 (BP Location: Right arm, Patient Position: Sitting, Cuff Size: Adult Large)   Pulse 74   Temp 98  F (36.7  C) (Oral)   Resp 18   Wt 107 kg (236 lb)   SpO2 97%   BMI 38.85 kg/m    Body mass index is 38.85 kg/m .  Physical Exam   GENERAL:  Alert oriented well nourished  HEAD: normocephalic atraumatic  SKIN:  no rash, hives, other lesions.  LYMPHATIC: no abnormal lymph nodes palable in cervical, axillary, supraclavicular or inguinal area.  RESP:  No rales or rhonchi, breath sounds bilaterally equal and vesicular  CV:  No tachycardia, S1 S2 normal No murmur.  GI:  Abdomen soft nontender no hepato or splenomegaly  MUSCULOSKELETAL:  No visible  joint redness or swelling.  NEURO:  No gross weakness gait normal  PSYCH: pleasant affect    Labs: I personally reviewed complete blood count, differential, renal function test, liver function tests LDH.  No cytopenias, LFTs within normal limits, renal function test within normal limits and LDH is normal as well.    Imaging: I personally reviewed PETCT neck/chest/abdomen/pelvis and discussed with the patient.  Complete response to treatment so far.    Assessment and Plan:    1) Diffuse large B cell lymphoma:  - She is tolerating treatment very well. She has excellent response to chemotherapy  so far. We will continue treatment for total 6 cycles.  - No major toxicities of chemotherapy. Continue to monitor.    Total time spent on date of service in review of medical records, review of labs, history taking, physical exam, discussion of assessment and plan, counseling and patient education is 40 minutes.    Darya Yu MD  Attending Physician  Pager [phone]              Signed Electronically by: Darya Yu MD

## 2024-10-09 NOTE — TELEPHONE ENCOUNTER
RECORDS RECEIVED FROM:    DATE RECEIVED:    NOTES STATUS DETAILS   OFFICE NOTE from referring provider  Internal Hospital f/u   OFFICE NOTE from other cardiologists  N/A    RECORDS from hospital/ED Internal 7-17-24   MEDICATION LIST Internal    GENERAL CARDIO RECORDS   (ALL APPOINTMENT TYPES)     LABS (CBC,BMP,CMP, TSH) Internal    EKG (STRIPS & REPORTS) Internal 7-18-24   MONITORS (STRIPS & REPORTS) N/A    ECHOS (IMAGES AND REPORTS) Internal 7-18-24   STRESS TESTS (IMAGES AND REPORTS) N/A    cMRI (IMAGES AND REPORTS) N/A    Cardiac cath (IMAGES AND REPORTS) N/A    CT/CTA (IMAGES AND REPORTS) Internal 7-17-24

## 2024-10-11 ENCOUNTER — APPOINTMENT (OUTPATIENT)
Dept: LAB | Facility: CLINIC | Age: 84
End: 2024-10-11
Attending: STUDENT IN AN ORGANIZED HEALTH CARE EDUCATION/TRAINING PROGRAM
Payer: COMMERCIAL

## 2024-10-11 ENCOUNTER — INFUSION THERAPY VISIT (OUTPATIENT)
Dept: ONCOLOGY | Facility: CLINIC | Age: 84
End: 2024-10-11
Attending: STUDENT IN AN ORGANIZED HEALTH CARE EDUCATION/TRAINING PROGRAM
Payer: COMMERCIAL

## 2024-10-11 ENCOUNTER — PATIENT OUTREACH (OUTPATIENT)
Dept: ONCOLOGY | Facility: CLINIC | Age: 84
End: 2024-10-11

## 2024-10-11 VITALS
DIASTOLIC BLOOD PRESSURE: 72 MMHG | TEMPERATURE: 98 F | WEIGHT: 237 LBS | HEART RATE: 74 BPM | BODY MASS INDEX: 39.01 KG/M2 | OXYGEN SATURATION: 98 % | SYSTOLIC BLOOD PRESSURE: 136 MMHG | RESPIRATION RATE: 16 BRPM

## 2024-10-11 DIAGNOSIS — C83.398 DIFFUSE LARGE B-CELL LYMPHOMA OF EXTRANODAL SITE: Primary | ICD-10-CM

## 2024-10-11 DIAGNOSIS — Z51.11 ENCOUNTER FOR ANTINEOPLASTIC CHEMOTHERAPY: ICD-10-CM

## 2024-10-11 LAB
ALBUMIN SERPL BCG-MCNC: 3.8 G/DL (ref 3.5–5.2)
ALP SERPL-CCNC: 158 U/L (ref 40–150)
ALT SERPL W P-5'-P-CCNC: 7 U/L (ref 0–50)
ANION GAP SERPL CALCULATED.3IONS-SCNC: 9 MMOL/L (ref 7–15)
AST SERPL W P-5'-P-CCNC: 19 U/L (ref 0–45)
BASOPHILS # BLD AUTO: 0 10E3/UL (ref 0–0.2)
BASOPHILS NFR BLD AUTO: 0 %
BILIRUB SERPL-MCNC: 0.5 MG/DL
BUN SERPL-MCNC: 21.3 MG/DL (ref 8–23)
CALCIUM SERPL-MCNC: 9.5 MG/DL (ref 8.8–10.4)
CHLORIDE SERPL-SCNC: 108 MMOL/L (ref 98–107)
CREAT SERPL-MCNC: 0.85 MG/DL (ref 0.51–0.95)
EGFRCR SERPLBLD CKD-EPI 2021: 68 ML/MIN/1.73M2
EOSINOPHIL # BLD AUTO: 0 10E3/UL (ref 0–0.7)
EOSINOPHIL NFR BLD AUTO: 0 %
ERYTHROCYTE [DISTWIDTH] IN BLOOD BY AUTOMATED COUNT: 19.4 % (ref 10–15)
GLUCOSE SERPL-MCNC: 91 MG/DL (ref 70–99)
HCO3 SERPL-SCNC: 24 MMOL/L (ref 22–29)
HCT VFR BLD AUTO: 35.8 % (ref 35–47)
HGB BLD-MCNC: 11.4 G/DL (ref 11.7–15.7)
IMM GRANULOCYTES # BLD: 0 10E3/UL
IMM GRANULOCYTES NFR BLD: 1 %
LYMPHOCYTES # BLD AUTO: 0.8 10E3/UL (ref 0.8–5.3)
LYMPHOCYTES NFR BLD AUTO: 14 %
MCH RBC QN AUTO: 29.6 PG (ref 26.5–33)
MCHC RBC AUTO-ENTMCNC: 31.8 G/DL (ref 31.5–36.5)
MCV RBC AUTO: 93 FL (ref 78–100)
MONOCYTES # BLD AUTO: 0.8 10E3/UL (ref 0–1.3)
MONOCYTES NFR BLD AUTO: 12 %
NEUTROPHILS # BLD AUTO: 4.6 10E3/UL (ref 1.6–8.3)
NEUTROPHILS NFR BLD AUTO: 73 %
NRBC # BLD AUTO: 0 10E3/UL
NRBC BLD AUTO-RTO: 1 /100
PLATELET # BLD AUTO: 301 10E3/UL (ref 150–450)
POTASSIUM SERPL-SCNC: 4.1 MMOL/L (ref 3.4–5.3)
PROT SERPL-MCNC: 6.8 G/DL (ref 6.4–8.3)
RBC # BLD AUTO: 3.85 10E6/UL (ref 3.8–5.2)
SODIUM SERPL-SCNC: 141 MMOL/L (ref 135–145)
WBC # BLD AUTO: 6.2 10E3/UL (ref 4–11)

## 2024-10-11 PROCEDURE — 96413 CHEMO IV INFUSION 1 HR: CPT

## 2024-10-11 PROCEDURE — 250N000013 HC RX MED GY IP 250 OP 250 PS 637: Performed by: STUDENT IN AN ORGANIZED HEALTH CARE EDUCATION/TRAINING PROGRAM

## 2024-10-11 PROCEDURE — 96411 CHEMO IV PUSH ADDL DRUG: CPT

## 2024-10-11 PROCEDURE — 96415 CHEMO IV INFUSION ADDL HR: CPT

## 2024-10-11 PROCEDURE — 96375 TX/PRO/DX INJ NEW DRUG ADDON: CPT

## 2024-10-11 PROCEDURE — 96372 THER/PROPH/DIAG INJ SC/IM: CPT | Performed by: STUDENT IN AN ORGANIZED HEALTH CARE EDUCATION/TRAINING PROGRAM

## 2024-10-11 PROCEDURE — 250N000011 HC RX IP 250 OP 636: Performed by: STUDENT IN AN ORGANIZED HEALTH CARE EDUCATION/TRAINING PROGRAM

## 2024-10-11 PROCEDURE — 85025 COMPLETE CBC W/AUTO DIFF WBC: CPT | Performed by: STUDENT IN AN ORGANIZED HEALTH CARE EDUCATION/TRAINING PROGRAM

## 2024-10-11 PROCEDURE — 96367 TX/PROPH/DG ADDL SEQ IV INF: CPT

## 2024-10-11 PROCEDURE — 96417 CHEMO IV INFUS EACH ADDL SEQ: CPT

## 2024-10-11 PROCEDURE — 258N000003 HC RX IP 258 OP 636: Performed by: STUDENT IN AN ORGANIZED HEALTH CARE EDUCATION/TRAINING PROGRAM

## 2024-10-11 PROCEDURE — 36591 DRAW BLOOD OFF VENOUS DEVICE: CPT | Performed by: STUDENT IN AN ORGANIZED HEALTH CARE EDUCATION/TRAINING PROGRAM

## 2024-10-11 PROCEDURE — 80053 COMPREHEN METABOLIC PANEL: CPT | Performed by: STUDENT IN AN ORGANIZED HEALTH CARE EDUCATION/TRAINING PROGRAM

## 2024-10-11 PROCEDURE — 96377 APPLICATON ON-BODY INJECTOR: CPT | Mod: 59 | Performed by: STUDENT IN AN ORGANIZED HEALTH CARE EDUCATION/TRAINING PROGRAM

## 2024-10-11 RX ORDER — HEPARIN SODIUM (PORCINE) LOCK FLUSH IV SOLN 100 UNIT/ML 100 UNIT/ML
5 SOLUTION INTRAVENOUS
Status: CANCELLED | OUTPATIENT
Start: 2024-10-11

## 2024-10-11 RX ORDER — MEPERIDINE HYDROCHLORIDE 25 MG/ML
25 INJECTION INTRAMUSCULAR; INTRAVENOUS; SUBCUTANEOUS
Status: CANCELLED
Start: 2024-10-11

## 2024-10-11 RX ORDER — DIPHENHYDRAMINE HCL 25 MG
50 CAPSULE ORAL ONCE
Status: CANCELLED
Start: 2024-10-11

## 2024-10-11 RX ORDER — ALBUTEROL SULFATE 0.83 MG/ML
2.5 SOLUTION RESPIRATORY (INHALATION)
Status: CANCELLED | OUTPATIENT
Start: 2024-10-11

## 2024-10-11 RX ORDER — METHYLPREDNISOLONE SODIUM SUCCINATE 125 MG/2ML
125 INJECTION INTRAMUSCULAR; INTRAVENOUS
Status: CANCELLED
Start: 2024-10-11

## 2024-10-11 RX ORDER — ALBUTEROL SULFATE 90 UG/1
1-2 INHALANT RESPIRATORY (INHALATION)
Status: CANCELLED
Start: 2024-10-11

## 2024-10-11 RX ORDER — EPINEPHRINE 1 MG/ML
0.3 INJECTION, SOLUTION INTRAMUSCULAR; SUBCUTANEOUS EVERY 5 MIN PRN
Status: CANCELLED | OUTPATIENT
Start: 2024-10-11

## 2024-10-11 RX ORDER — PALONOSETRON 0.05 MG/ML
0.25 INJECTION, SOLUTION INTRAVENOUS ONCE
Status: CANCELLED
Start: 2024-10-11

## 2024-10-11 RX ORDER — ACETAMINOPHEN 325 MG/1
650 TABLET ORAL ONCE
Status: COMPLETED | OUTPATIENT
Start: 2024-10-11 | End: 2024-10-11

## 2024-10-11 RX ORDER — DIPHENHYDRAMINE HCL 25 MG
50 CAPSULE ORAL ONCE
Status: COMPLETED | OUTPATIENT
Start: 2024-10-11 | End: 2024-10-11

## 2024-10-11 RX ORDER — DIPHENHYDRAMINE HYDROCHLORIDE 50 MG/ML
50 INJECTION INTRAMUSCULAR; INTRAVENOUS
Status: CANCELLED
Start: 2024-10-11

## 2024-10-11 RX ORDER — PALONOSETRON 0.05 MG/ML
0.25 INJECTION, SOLUTION INTRAVENOUS ONCE
Status: COMPLETED | OUTPATIENT
Start: 2024-10-11 | End: 2024-10-11

## 2024-10-11 RX ORDER — LORAZEPAM 2 MG/ML
0.5 INJECTION INTRAMUSCULAR EVERY 4 HOURS PRN
Status: CANCELLED | OUTPATIENT
Start: 2024-10-11

## 2024-10-11 RX ORDER — HEPARIN SODIUM,PORCINE 10 UNIT/ML
5-20 VIAL (ML) INTRAVENOUS DAILY PRN
Status: CANCELLED | OUTPATIENT
Start: 2024-10-11

## 2024-10-11 RX ORDER — MEPERIDINE HYDROCHLORIDE 25 MG/ML
25 INJECTION INTRAMUSCULAR; INTRAVENOUS; SUBCUTANEOUS EVERY 30 MIN PRN
Status: CANCELLED | OUTPATIENT
Start: 2024-10-11

## 2024-10-11 RX ORDER — ACETAMINOPHEN 325 MG/1
650 TABLET ORAL ONCE
Status: CANCELLED
Start: 2024-10-11

## 2024-10-11 RX ORDER — HEPARIN SODIUM (PORCINE) LOCK FLUSH IV SOLN 100 UNIT/ML 100 UNIT/ML
5 SOLUTION INTRAVENOUS
Status: DISCONTINUED | OUTPATIENT
Start: 2024-10-11 | End: 2024-10-11 | Stop reason: HOSPADM

## 2024-10-11 RX ORDER — HEPARIN SODIUM (PORCINE) LOCK FLUSH IV SOLN 100 UNIT/ML 100 UNIT/ML
5 SOLUTION INTRAVENOUS ONCE
Status: COMPLETED | OUTPATIENT
Start: 2024-10-11 | End: 2024-10-11

## 2024-10-11 RX ORDER — DOXORUBICIN HYDROCHLORIDE 2 MG/ML
25 INJECTION, SOLUTION INTRAVENOUS ONCE
Status: COMPLETED | OUTPATIENT
Start: 2024-10-11 | End: 2024-10-11

## 2024-10-11 RX ORDER — DOXORUBICIN HYDROCHLORIDE 2 MG/ML
25 INJECTION, SOLUTION INTRAVENOUS ONCE
Status: CANCELLED | OUTPATIENT
Start: 2024-10-11

## 2024-10-11 RX ADMIN — Medication 5 ML: at 10:31

## 2024-10-11 RX ADMIN — FOSAPREPITANT 150 MG: 150 INJECTION, POWDER, LYOPHILIZED, FOR SOLUTION INTRAVENOUS at 11:36

## 2024-10-11 RX ADMIN — CYCLOPHOSPHAMIDE 890 MG: 1 INJECTION, POWDER, FOR SOLUTION INTRAVENOUS; ORAL at 12:14

## 2024-10-11 RX ADMIN — DOXORUBICIN HYDROCHLORIDE 60 MG: 2 INJECTION, SOLUTION INTRAVENOUS at 12:01

## 2024-10-11 RX ADMIN — SODIUM CHLORIDE 250 ML: 9 INJECTION, SOLUTION INTRAVENOUS at 11:16

## 2024-10-11 RX ADMIN — VINCRISTINE SULFATE 1 MG: 1 INJECTION, SOLUTION INTRAVENOUS at 12:07

## 2024-10-11 RX ADMIN — PEGFILGRASTIM 6 MG: KIT SUBCUTANEOUS at 12:17

## 2024-10-11 RX ADMIN — Medication 5 ML: at 14:23

## 2024-10-11 RX ADMIN — RITUXIMAB-ABBS 800 MG: 10 INJECTION, SOLUTION INTRAVENOUS at 12:50

## 2024-10-11 RX ADMIN — DIPHENHYDRAMINE HYDROCHLORIDE 50 MG: 25 CAPSULE ORAL at 12:16

## 2024-10-11 RX ADMIN — PALONOSETRON HYDROCHLORIDE 0.25 MG: 0.25 INJECTION INTRAVENOUS at 11:19

## 2024-10-11 RX ADMIN — ACETAMINOPHEN 650 MG: 325 TABLET ORAL at 12:16

## 2024-10-11 ASSESSMENT — PAIN SCALES - GENERAL: PAINLEVEL: NO PAIN (0)

## 2024-10-11 NOTE — PROGRESS NOTES
[facility]: Cancer Care Short Note                                                                                          RNCC received call from patient's daughter, Denis, asking when her mom will get scheduled for pentamidine. RNCC informed Denis that Dr. Yu placed orders today and that they should be contacted to get scheduled soon. RNCC sent message to scheduling.     Denis appreciative of the follow-up call.    Luaren Leonard, MANUELN, RN  RN Care Coordinator   [phone]

## 2024-10-11 NOTE — PROGRESS NOTES
History:    Ms. Cool is a 84-year-old woman with history pertinent for    DLBCL (diagnosed in 7/2024)  CAD  HTN  HLD  Osteoporosis  Paroxysmal Afib  Autoimmune hepatitis   Cholelithiasis  Bilateral multiple renal cysts  Class II obesity  Hyperparathyroidism s/p parathyroid adenoma removal  History of saddle PE with cor pulmonale, 2021  Hx of TIA in 2013    Patient presents to the Cardio-oncology clinic for management of atrial fibrillation.  She was admitted to the hospital in 7/2024 for low back pain, and found to have lytic lesion in T12 and T11 fracture and widespread bony metastases.  During her admission, she was found to have heart rate of 120.  An EKG noted accelerated junctional rhythm with retrograde P waves. She was on digoxin at that time which was prescribed for AT/A-fib in the past.  Patient was on Xarelto for history of saddle PE.  Digoxin was discontinued and she was started on metoprolol tartrate 12.5 mg twice daily.    She underwent bone biopsy on 7/30/2024, revealing DLBCL, for which she started on R-CHOP by oncology.  She has received her third cycle on 10/11/2024    Today, she does not endorse any chest pain, palpitations or syncope.  Her exercise capacity is limited by her knee OA.  She can walk from the parking lot to the hospital without any difficulties.  She has been compliant with her medications including her Xarelto, she has not had any bleeding problems.  She has not been taking digoxin as recommended.  She has had unilateral left-sided lower extremity swelling.  She says that this has been improving.     Allergies - reviewed   No Known Allergies    Past history -reviewed  Past Medical History:   Diagnosis Date    Arthritis     Hypertension         Social history - reviewed  Social History     Tobacco Use    Smoking status: Former     Types: Cigarettes     Passive exposure: Past    Smokeless tobacco: Never   Substance Use Topics    Alcohol use: Not Currently    Drug use: Never  "      Family history -reviewed  Family History   Problem Relation Age of Onset    Cardiomyopathy Daughter        ROS: non contributory on the 10-point review of system    Exam:     /79 (BP Location: Right arm, Patient Position: Chair, Cuff Size: Adult Large)   Pulse 83   Wt 107 kg (235 lb 14.4 oz)   SpO2 95%   BMI 38.83 kg/m    In general, the patient is in no apparent distress.      HEENT: Sclerae white, not injected.    Neck: No JVD. No thyromegaly  Heart: RRR. Normal S1, S2. No murmur, rub, click, or gallop.    Lungs: Clear bilaterally.  No rhonchi, wheezes, rales.   Extremities: 2+ pretibial edema on left lower extremity. the pulses are 2+at the radial bilaterally.      I have independently reviewed this patient's relevant laboratory and cardiac data :    No results for input(s): \"CHOL\", \"HDL\", \"LDL\", \"TRIG\", \"CHOLHDLRATIO\" in the last 50408 hours.   Recent Labs   Lab Test 10/11/24  1028 09/18/24  1608   AST 19 18     Recent Labs   Lab Test 10/11/24  1028 09/18/24  1608   ALT 7 8     Recent Labs   Lab Test 10/11/24  1028 09/18/24  1608    137   POTASSIUM 4.1 4.4   CHLORIDE 108* 105   CO2 24 23   ANIONGAP 9 9   BUN 21.3 18.0   CR 0.85 0.79     Recent Labs   Lab Test 10/11/24  1028 09/18/24  1608   WBC 6.2 7.5   RBC 3.85 4.24   HGB 11.4* 12.4   MCV 93 91    328     EKG today normal sinus rhythm    ECG 7/17/2024: There is a junctional rhythm with ventricular rate of 124, retrogradely conducted P waves    Echo 7/2024  Technically difficult study.Extremely poor acoustic windows.  Global and regional left ventricular function is hyperkinetic with an EF >70%.  Global right ventricular function is normal.  The inferior vena cava is normal.  No pericardial effusion.  There is no prior study for direct comparison.    CTA chest 7/20/2024: Images reviewed by me, severe calcifications in LAD and LCx    Assessment and Plan:  84 year old patient with    DLBCL (diagnosed in " 7/2024)  CAD  HTN  HLD  Osteoporosis  Paroxysmal Afib  Autoimmune hepatitis   Cholelithiasis  Bilateral multiple renal cysts  Class II obesity  Hyperparathyroidism s/p parathyroid adenoma removal  History of saddle PE with cor pulmonale, 2021  Hx of TIA in 2013    For her A-fib, her rhythm today is NSR her heart rate is controlled with metoprolol tartrate 12.5 twice daily.  Her [id number] is at least 6 (hypertension, age, sex, TIA) and she is anticoagulated with Xarelto.    Her blood pressure is well-controlled on her home medications spironolactone and hydrochlorothiazide. Patient is euvolemic on exam today. She has CAD based on CTA chest which was done on 7/2024 with calcifications in the LAD and LCx territory.  She currently denies symptom of angina or heart failure.  We will continue atorvastatin for CAD    She has chronic right-sided unilateral lower extremity swelling.  This has been assessed with US which did not reveal any DVTs    Patient is tolerating chemotherapy well.   HF education: goals for staying active, fluid and salt intake, signs and symptoms of HF that would require immediate medical attention were reviewed in detail. Patient's family will call clinic with any urgent questions.       Recommendations:   Continue current medications.  2.   1 year follow-up      Milo Rosenberg MD  Cardiovascular Disease Fellow      ATTENDING ATTESTATION:  This patient has been seen and examined by me October 14, 2024 with Dr. Rosenberg, cardiology fellow. I have reviewed the vitals, laboratory and imaging data relevant to this patient's care. I have edited this note to reflect our joint assessment and plan, and discussed the plan with the patient.    Renea Kirk MD, MS  Professor of Medicine  Cardiovascular Medicine

## 2024-10-11 NOTE — PATIENT INSTRUCTIONS
Neulasta was placed on Friday, 10/11 @ 1220. Medication will start to release ~3:20pm Saturday and you can remove by 4:20pm. Please call our triage line for any questions, concerns, of if the device starts blinking RED. Thank you! [phone].    Contact Numbers    [facility] Main Line (for Scheduling/Triage/After Hours Nurse Line): [phone]    Please call the [facility] nurse triage or the after hours nurse line if you experience a temperature greater than or equal to 100.4, shaking chills, have uncontrolled nausea, vomiting and/or diarrhea, dizziness, lightheadedness, shortness of breath, chest pain, bleeding, unexplained bruising, or if you have any other new/concerning symptoms, questions or concerns.     If you are having any concerning symptoms or wish to speak to a provider before your next infusion visit, please call your care coordinator or triage to notify them so we can adequately serve you.     If you need any refills on medications (narcotics or other medications), please call before your infusion appointment.      October 2024 Sunday Monday Tuesday Wednesday Thursday Friday Saturday             1     2     3     4    PET ONCOLOGY WHOLE BODY   1:00 PM   (30 min.)   [id number]   [facility] 5       6     7    PENTAMADINE NEB   2:45 PM   (60 min.)    PFL PENT   [facility] Pulmonary Function Testing [facility]   4:15 PM   (30 min.)   Darya Yu MD   [facility] 8     9     10     11    LAB PERIPHERAL  10:00 AM   (15 min.)   [facility]   [facility]    ONC INFUSION 5 HR (300 MIN)  10:30 AM   (300 min.)    ONC INFUSION NURSE   [facility] 12       13     14    NEW CARDIOLOGY   3:45 PM   (30 min.)   Renea Kirk MD   [facility] 15     16     17     18     19       20     21     22     23     24     25     26       27     28     29     30      31    LAB PERIPHERAL   8:30 AM   (15 min.)    [facility]   [facility]    RETURN ACTIVE TREATMENT   8:45 AM   (45 min.)   Tania Coburn APRN CNP   [facility]    ONC INFUSION 5 HR (300 MIN)  11:30 AM   (300 min.)    ONC INFUSION NURSE   [facility]                       November 2024 Sunday Monday Tuesday Wednesday Thursday Friday Saturday                            1     2       3     4     5     6     7     8     9       10     11     12     13     14     15     16       17     18     19     20     21    LAB PERIPHERAL  10:30 AM   (15 min.)    [facility]   [facility]    RETURN ACTIVE TREATMENT  10:45 AM   (45 min.)   Cindi Potter APRN CNP   [facility]    ONC INFUSION 5 HR (300 MIN)  11:30 AM   (300 min.)    ONC INFUSION NURSE   [facility] 22     23       24     25     26     27     28     29     30                     Lab Results:  Recent Results (from the past 12 hour(s))   Comprehensive metabolic panel    Collection Time: 10/11/24 10:28 AM   Result Value Ref Range    Sodium 141 135 - 145 mmol/L    Potassium 4.1 3.4 - 5.3 mmol/L    Carbon Dioxide (CO2) 24 22 - 29 mmol/L    Anion Gap 9 7 - 15 mmol/L    Urea Nitrogen 21.3 8.0 - 23.0 mg/dL    Creatinine 0.85 0.51 - 0.95 mg/dL    GFR Estimate 68 >60 mL/min/1.73m2    Calcium 9.5 8.8 - 10.4 mg/dL    Chloride 108 (H) 98 - 107 mmol/L    Glucose 91 70 - 99 mg/dL    Alkaline Phosphatase 158 (H) 40 - 150 U/L    AST 19 0 - 45 U/L    ALT 7 0 - 50 U/L    Protein Total 6.8 6.4 - 8.3 g/dL    Albumin 3.8 3.5 - 5.2 g/dL    Bilirubin Total 0.5 <=1.2 mg/dL   CBC with platelets and differential    Collection Time: 10/11/24 10:28 AM   Result Value Ref Range    WBC Count 6.2 4.0 - 11.0 10e3/uL    RBC Count 3.85 3.80 - 5.20 10e6/uL    Hemoglobin 11.4 (L) 11.7 - 15.7 g/dL    Hematocrit  35.8 35.0 - 47.0 %    MCV 93 78 - 100 fL    MCH 29.6 26.5 - 33.0 pg    MCHC 31.8 31.5 - 36.5 g/dL    RDW 19.4 (H) 10.0 - 15.0 %    Platelet Count 301 150 - 450 10e3/uL    % Neutrophils 73 %    % Lymphocytes 14 %    % Monocytes 12 %    % Eosinophils 0 %    % Basophils 0 %    % Immature Granulocytes 1 %    NRBCs per 100 WBC 1 (H) <1 /100    Absolute Neutrophils 4.6 1.6 - 8.3 10e3/uL    Absolute Lymphocytes 0.8 0.8 - 5.3 10e3/uL    Absolute Monocytes 0.8 0.0 - 1.3 10e3/uL    Absolute Eosinophils 0.0 0.0 - 0.7 10e3/uL    Absolute Basophils 0.0 0.0 - 0.2 10e3/uL    Absolute Immature Granulocytes 0.0 <=0.4 10e3/uL    Absolute NRBCs 0.0 10e3/uL

## 2024-10-11 NOTE — PROGRESS NOTES
Infusion Nursing Note:  Cara Cool presents today for Cycle 3 Day 1 Adriamycin, Vincristine, Cytoxan, Rapid Rituxan + Neulasta On-Pro.    Patient seen by provider today: No   present during visit today: Not Applicable.    Note: Patient arrives feeling well. Brought prednisone to take upon arrival today. Denies any acute concerns or changes since MD visit 10/7.      Intravenous Access:  Implanted Port.    Treatment Conditions:  Lab Results   Component Value Date    HGB 11.4 (L) 10/11/2024    WBC 6.2 10/11/2024    ANEU 3.3 09/28/2021    ANEUTAUTO 4.6 10/11/2024     10/11/2024        Lab Results   Component Value Date     10/11/2024    POTASSIUM 4.1 10/11/2024    MAG 1.7 07/17/2024    CR 0.85 10/11/2024    TREVOR 9.5 10/11/2024    BILITOTAL 0.5 10/11/2024    ALBUMIN 3.8 10/11/2024    ALT 7 10/11/2024    AST 19 10/11/2024       Results reviewed, labs MET treatment parameters, ok to proceed with treatment.  ECHO/MUGA completed 7/18/24  EF >70%.      Post Infusion Assessment:  Patient tolerated infusion without incident.  Blood return noted pre and post infusion.  Blood return noted during Adriamycin administration every 2-3 cc.  Blood return noted pre, during and post Vincristine gravity hang.  Site patent and intact, free from redness, edema or discomfort.  No evidence of extravasations.  Access discontinued per protocol.       Neulasta Onpro On-Body injector applied to left upper abdomen at 1220.  Writer discussed Neulasta injection would start tomorrow 10/12 at 1520, approximately 27 hours after application applied today.    Written and Verbal instruction reviewed with patient.  Pt instructed when the dose delivery starts, it will take about 45 minutes to complete.  Pt aware Neulasta Onpro On-Body should have green flashing light and to call triage or on-call MD if injector flashes red or appears to be leaking.   Pt aware to keep Onpro On-Body Neulasta 4 inches away from electrical equipment  and to avoid showering 4 hours prior to injection.   Neulasta Onpro Lot number: See MAR comments.      Discharge Plan:   Patient declined prescription refills.  Discharge instructions reviewed with: Patient and daughter.  Patient and/or family verbalized understanding of discharge instructions and all questions answered.  Copy of AVS printed and given to patient. Patient will return 10/31 for next appointment.  Patient discharged in stable condition accompanied by: daughter.  Departure Mode: Ambulatory.      Silvia Michelle RN

## 2024-10-11 NOTE — NURSING NOTE
Chief Complaint   Patient presents with    Port Draw     Labs collected from port by RN. Vitals taken. Checked in for appointment(s).      Port accessed with 20 gauge 3/4 inch flat needle by RN, labs collected, line flushed with saline and heparin.  Vitals taken. Pt checked in for appointment(s).     Ledy Casillas RN

## 2024-10-13 ENCOUNTER — HEALTH MAINTENANCE LETTER (OUTPATIENT)
Age: 84
End: 2024-10-13

## 2024-10-14 ENCOUNTER — PRE VISIT (OUTPATIENT)
Dept: CARDIOLOGY | Facility: CLINIC | Age: 84
End: 2024-10-14
Payer: COMMERCIAL

## 2024-10-14 ENCOUNTER — MYC MEDICAL ADVICE (OUTPATIENT)
Dept: ONCOLOGY | Facility: CLINIC | Age: 84
End: 2024-10-14

## 2024-10-14 ENCOUNTER — OFFICE VISIT (OUTPATIENT)
Dept: CARDIOLOGY | Facility: CLINIC | Age: 84
End: 2024-10-14
Attending: NURSE PRACTITIONER
Payer: COMMERCIAL

## 2024-10-14 VITALS
SYSTOLIC BLOOD PRESSURE: 116 MMHG | DIASTOLIC BLOOD PRESSURE: 79 MMHG | BODY MASS INDEX: 38.83 KG/M2 | WEIGHT: 235.9 LBS | OXYGEN SATURATION: 95 % | HEART RATE: 83 BPM

## 2024-10-14 DIAGNOSIS — I47.19 ATRIAL TACHYCARDIA (H): ICD-10-CM

## 2024-10-14 DIAGNOSIS — B37.0 THRUSH: ICD-10-CM

## 2024-10-14 DIAGNOSIS — I10 BENIGN ESSENTIAL HYPERTENSION: ICD-10-CM

## 2024-10-14 DIAGNOSIS — I48.0 PAROXYSMAL ATRIAL FIBRILLATION (H): Primary | ICD-10-CM

## 2024-10-14 PROCEDURE — 93010 ELECTROCARDIOGRAM REPORT: CPT | Performed by: INTERNAL MEDICINE

## 2024-10-14 PROCEDURE — 99204 OFFICE O/P NEW MOD 45 MIN: CPT | Mod: GC | Performed by: INTERNAL MEDICINE

## 2024-10-14 PROCEDURE — G0463 HOSPITAL OUTPT CLINIC VISIT: HCPCS | Performed by: INTERNAL MEDICINE

## 2024-10-14 PROCEDURE — 93005 ELECTROCARDIOGRAM TRACING: CPT

## 2024-10-14 RX ORDER — RIVAROXABAN 20 MG/1
20 TABLET, FILM COATED ORAL
COMMUNITY
Start: 2024-10-08

## 2024-10-14 ASSESSMENT — PAIN SCALES - GENERAL: PAINLEVEL: NO PAIN (0)

## 2024-10-14 NOTE — LETTER
10/14/2024      RE: Cara Cool  [address] [place] [id number]       Dear Colleague,    Thank you for the opportunity to participate in the care of your patient, Cara Cool, at the [facility] [place] at [facility]. Please see a copy of my visit note below.    History:    Ms. Cool is a 84-year-old woman with history pertinent for    DLBCL (diagnosed in 7/2024)  CAD  HTN  HLD  Osteoporosis  Paroxysmal Afib  Autoimmune hepatitis   Cholelithiasis  Bilateral multiple renal cysts  Class II obesity  Hyperparathyroidism s/p parathyroid adenoma removal  History of saddle PE with cor pulmonale, 2021  Hx of TIA in 2013    Patient presents to the Cardio-oncology clinic for management of atrial fibrillation.  She was admitted to the hospital in 7/2024 for low back pain, and found to have lytic lesion in T12 and T11 fracture and widespread bony metastases.  During her admission, she was found to have heart rate of 120.  An EKG noted accelerated junctional rhythm with retrograde P waves. She was on digoxin at that time which was prescribed for AT/A-fib in the past.  Patient was on Xarelto for history of saddle PE.  Digoxin was discontinued and she was started on metoprolol tartrate 12.5 mg twice daily.    She underwent bone biopsy on 7/30/2024, revealing DLBCL, for which she started on R-CHOP by oncology.  She has received her third cycle on 10/11/2024    Today, she does not endorse any chest pain, palpitations or syncope.  Her exercise capacity is limited by her knee OA.  She can walk from the parking lot to the hospital without any difficulties.  She has been compliant with her medications including her Xarelto, she has not had any bleeding problems.  She has not been taking digoxin as recommended.  She has had unilateral left-sided lower extremity swelling.  She says that this has been improving.     Allergies - reviewed   No Known  "Allergies    Past history -reviewed  Past Medical History:   Diagnosis Date     Arthritis      Hypertension         Social history - reviewed  Social History     Tobacco Use     Smoking status: Former     Types: Cigarettes     Passive exposure: Past     Smokeless tobacco: Never   Substance Use Topics     Alcohol use: Not Currently     Drug use: Never       Family history -reviewed  Family History   Problem Relation Age of Onset     Cardiomyopathy Daughter        ROS: non contributory on the 10-point review of system    Exam:     /79 (BP Location: Right arm, Patient Position: Chair, Cuff Size: Adult Large)   Pulse 83   Wt 107 kg (235 lb 14.4 oz)   SpO2 95%   BMI 38.83 kg/m    In general, the patient is in no apparent distress.      HEENT: Sclerae white, not injected.    Neck: No JVD. No thyromegaly  Heart: RRR. Normal S1, S2. No murmur, rub, click, or gallop.    Lungs: Clear bilaterally.  No rhonchi, wheezes, rales.   Extremities: 2+ pretibial edema on left lower extremity. the pulses are 2+at the radial bilaterally.      I have independently reviewed this patient's relevant laboratory and cardiac data :    No results for input(s): \"CHOL\", \"HDL\", \"LDL\", \"TRIG\", \"CHOLHDLRATIO\" in the last 90176 hours.   Recent Labs   Lab Test 10/11/24  1028 09/18/24  1608   AST 19 18     Recent Labs   Lab Test 10/11/24  1028 09/18/24  1608   ALT 7 8     Recent Labs   Lab Test 10/11/24  1028 09/18/24  1608    137   POTASSIUM 4.1 4.4   CHLORIDE 108* 105   CO2 24 23   ANIONGAP 9 9   BUN 21.3 18.0   CR 0.85 0.79     Recent Labs   Lab Test 10/11/24  1028 09/18/24  1608   WBC 6.2 7.5   RBC 3.85 4.24   HGB 11.4* 12.4   MCV 93 91    328     EKG today normal sinus rhythm    ECG 7/17/2024: There is a junctional rhythm with ventricular rate of 124, retrogradely conducted P waves    Echo 7/2024  Technically difficult study.Extremely poor acoustic windows.  Global and regional left ventricular function is hyperkinetic with " an EF >70%.  Global right ventricular function is normal.  The inferior vena cava is normal.  No pericardial effusion.  There is no prior study for direct comparison.    CTA chest 7/20/2024: Images reviewed by me, severe calcifications in LAD and LCx    Assessment and Plan:  84 year old patient with    DLBCL (diagnosed in 7/2024)  CAD  HTN  HLD  Osteoporosis  Paroxysmal Afib  Autoimmune hepatitis   Cholelithiasis  Bilateral multiple renal cysts  Class II obesity  Hyperparathyroidism s/p parathyroid adenoma removal  History of saddle PE with cor pulmonale, 2021  Hx of TIA in 2013    For her A-fib, her rhythm today is NSR her heart rate is controlled with metoprolol tartrate 12.5 twice daily.  Her [id number] is at least 6 (hypertension, age, sex, TIA) and she is anticoagulated with Xarelto.    Her blood pressure is well-controlled on her home medications spironolactone and hydrochlorothiazide. Patient is euvolemic on exam today. She has CAD based on CTA chest which was done on 7/2024 with calcifications in the LAD and LCx territory.  She currently denies symptom of angina or heart failure.  We will continue atorvastatin for CAD    She has chronic right-sided unilateral lower extremity swelling.  This has been assessed with US which did not reveal any DVTs    Patient is tolerating chemotherapy well.   HF education: goals for staying active, fluid and salt intake, signs and symptoms of HF that would require immediate medical attention were reviewed in detail. Patient's family will call clinic with any urgent questions.       Recommendations:   Continue current medications.  2.   1 year follow-up      Milo Rosenberg MD  Cardiovascular Disease Fellow      ATTENDING ATTESTATION:  This patient has been seen and examined by me October 14, 2024 with Dr. Rosenberg, cardiology fellow. I have reviewed the vitals, laboratory and imaging data relevant to this patient's care. I have edited this note to reflect our joint  assessment and plan, and discussed the plan with the patient.    Renea Kirk MD, MS  Professor of Medicine  Cardiovascular Medicine          Please do not hesitate to contact me if you have any questions/concerns.     Sincerely,     Renea Kirk MD

## 2024-10-14 NOTE — PATIENT INSTRUCTIONS
You were seen today in the Cardiovascular Clinic at the [facility].   Cardiology Providers you saw during your visit:    Dr. Kirk    Diagnosis:   Atrial tachycardia    Follow-up:   - 1 year follow up with Dr. Kirk    For emergencies call 911.     If you have any questions regarding your visit please contact your care team:     [facility]  Cardiology Care Coordinator    Appointment scheduling or nurse questions: [phone]    On Call Cardiologist for after hours or on weekends: [phone]    option #4    If you need a medication refill please contact your pharmacy.  Please allow 3 business days for your refill to be completed.    As always, thank you for trusting us with your health care needs!

## 2024-10-14 NOTE — NURSING NOTE
Chief Complaint   Patient presents with    Follow Up     NEW CARDIOLOGY     Vitals were taken and medications reconciled.    Deni Lord, EMT  4:01 PM

## 2024-10-15 LAB
ATRIAL RATE - MUSE: 83 BPM
DIASTOLIC BLOOD PRESSURE - MUSE: NORMAL MMHG
INTERPRETATION ECG - MUSE: NORMAL
P AXIS - MUSE: 45 DEGREES
PR INTERVAL - MUSE: 150 MS
QRS DURATION - MUSE: 74 MS
QT - MUSE: 340 MS
QTC - MUSE: 399 MS
R AXIS - MUSE: 26 DEGREES
SYSTOLIC BLOOD PRESSURE - MUSE: NORMAL MMHG
T AXIS - MUSE: 66 DEGREES
VENTRICULAR RATE- MUSE: 83 BPM

## 2024-10-17 DIAGNOSIS — B37.0 THRUSH: Primary | ICD-10-CM

## 2024-10-17 RX ORDER — NYSTATIN 100000 [USP'U]/ML
500000 SUSPENSION ORAL 4 TIMES DAILY
Qty: 200 ML | Refills: 1 | Status: SHIPPED | OUTPATIENT
Start: 2024-10-17

## 2024-10-17 RX ORDER — NYSTATIN 100000 [USP'U]/ML
500000 SUSPENSION ORAL 4 TIMES DAILY
Qty: 200 ML | Refills: 1 | Status: SHIPPED | OUTPATIENT
Start: 2024-10-17 | End: 2024-10-17

## 2024-10-17 NOTE — TELEPHONE ENCOUNTER
Oncology Nurse Triage - Mucositis  Situation-   Cara reporting white tongue coating after breathing treatments via My[facility] message- photo attached. Triage RN call to pt to follow up.     Background:  Treating Provider:  Dr. Darya Yu    Date of last office visit: 10/7/24    Recent Treatments: 10/11/24  Cycle 3 Day 1 Adriamycin, Vincristine, Cytoxan, Rapid Rituxan + Neulasta On-Pro   Receives monthly neb pentamidine- last treatment on 10/7/24.     Assessment:     Onset  a day or after last neb treatment on 10/7/24.  Location of ulcerations/white patches(if present) white film coating tongue   Rates: denies pain  Any difficulty swallowing?: NO  Hoarseness?: NO  Current interventions tried?: brushing teeth, using mouth wash 2-3x/day- feels this helps thin the coating.  Other associated symptoms?: affecting taste of food  Any bowel/bladder issues: no  Nausea/vomiting: No    Fevers: NO  Chills: NO  Cough: NO  Sore throat: NO  SOB: YES in last couple of days, worse with exertion, does not feel it is severe  Chest pain: NO      Pharmacy of choice: [facility]     Recommendations:   Pt informed will send message to care team to see if antifungal mouth rinse or medication is appropriate and will call her back with recommendations. Writer advised pt if breathing worsens or she develops any fever, she should seek emergent care. Pt voiced understanding.     [id number] call to pt, reviewed recommendations for nystatin. Pt voiced understanding. Pt requesting nystatin go to pharmacy closer to her so her daughter can pick it up[id number] to [facility] there instead of [facility].

## 2024-10-25 RX ORDER — METHYLPREDNISOLONE SODIUM SUCCINATE 40 MG/ML
40 INJECTION INTRAMUSCULAR; INTRAVENOUS
Status: CANCELLED
Start: 2024-10-25

## 2024-10-25 RX ORDER — METHYLPREDNISOLONE SODIUM SUCCINATE 40 MG/ML
40 INJECTION INTRAMUSCULAR; INTRAVENOUS
Start: 2024-10-25

## 2024-10-25 RX ORDER — ALBUTEROL SULFATE 0.83 MG/ML
2.5 SOLUTION RESPIRATORY (INHALATION)
Status: CANCELLED | OUTPATIENT
Start: 2024-10-25

## 2024-10-25 RX ORDER — ALBUTEROL SULFATE 90 UG/1
1-2 INHALANT RESPIRATORY (INHALATION)
Start: 2024-10-25

## 2024-10-25 RX ORDER — DIPHENHYDRAMINE HYDROCHLORIDE 50 MG/ML
25 INJECTION INTRAMUSCULAR; INTRAVENOUS
Status: CANCELLED
Start: 2024-10-25

## 2024-10-25 RX ORDER — MEPERIDINE HYDROCHLORIDE 25 MG/ML
25 INJECTION INTRAMUSCULAR; INTRAVENOUS; SUBCUTANEOUS
Status: CANCELLED | OUTPATIENT
Start: 2024-10-25

## 2024-10-25 RX ORDER — DIPHENHYDRAMINE HYDROCHLORIDE 50 MG/ML
25 INJECTION INTRAMUSCULAR; INTRAVENOUS
Start: 2024-10-25

## 2024-10-25 RX ORDER — EPINEPHRINE 1 MG/ML
0.3 INJECTION, SOLUTION, CONCENTRATE INTRAVENOUS EVERY 5 MIN PRN
Status: CANCELLED | OUTPATIENT
Start: 2024-10-25

## 2024-10-25 RX ORDER — ALBUTEROL SULFATE 0.83 MG/ML
2.5 SOLUTION RESPIRATORY (INHALATION)
OUTPATIENT
Start: 2024-10-25

## 2024-10-25 RX ORDER — MEPERIDINE HYDROCHLORIDE 25 MG/ML
25 INJECTION INTRAMUSCULAR; INTRAVENOUS; SUBCUTANEOUS
OUTPATIENT
Start: 2024-10-25

## 2024-10-25 RX ORDER — ALBUTEROL SULFATE 90 UG/1
1-2 INHALANT RESPIRATORY (INHALATION)
Status: CANCELLED
Start: 2024-10-25

## 2024-10-25 RX ORDER — DIPHENHYDRAMINE HYDROCHLORIDE 50 MG/ML
50 INJECTION INTRAMUSCULAR; INTRAVENOUS
Status: CANCELLED
Start: 2024-10-25

## 2024-10-25 RX ORDER — DIPHENHYDRAMINE HYDROCHLORIDE 50 MG/ML
50 INJECTION INTRAMUSCULAR; INTRAVENOUS
Start: 2024-10-25

## 2024-10-25 RX ORDER — EPINEPHRINE 1 MG/ML
0.3 INJECTION, SOLUTION, CONCENTRATE INTRAVENOUS EVERY 5 MIN PRN
OUTPATIENT
Start: 2024-10-25

## 2024-10-31 ENCOUNTER — ONCOLOGY VISIT (OUTPATIENT)
Dept: ONCOLOGY | Facility: CLINIC | Age: 84
End: 2024-10-31
Attending: STUDENT IN AN ORGANIZED HEALTH CARE EDUCATION/TRAINING PROGRAM
Payer: COMMERCIAL

## 2024-10-31 ENCOUNTER — APPOINTMENT (OUTPATIENT)
Dept: LAB | Facility: CLINIC | Age: 84
End: 2024-10-31
Attending: STUDENT IN AN ORGANIZED HEALTH CARE EDUCATION/TRAINING PROGRAM
Payer: COMMERCIAL

## 2024-10-31 VITALS
HEART RATE: 78 BPM | RESPIRATION RATE: 16 BRPM | WEIGHT: 236.1 LBS | OXYGEN SATURATION: 94 % | DIASTOLIC BLOOD PRESSURE: 80 MMHG | SYSTOLIC BLOOD PRESSURE: 123 MMHG | TEMPERATURE: 98.1 F | BODY MASS INDEX: 38.86 KG/M2

## 2024-10-31 DIAGNOSIS — C83.398 DIFFUSE LARGE B-CELL LYMPHOMA OF EXTRANODAL SITE: Primary | ICD-10-CM

## 2024-10-31 DIAGNOSIS — Z51.11 ENCOUNTER FOR ANTINEOPLASTIC CHEMOTHERAPY: ICD-10-CM

## 2024-10-31 LAB
ALBUMIN SERPL BCG-MCNC: 3.9 G/DL (ref 3.5–5.2)
ALP SERPL-CCNC: 161 U/L (ref 40–150)
ALT SERPL W P-5'-P-CCNC: 9 U/L (ref 0–50)
ANION GAP SERPL CALCULATED.3IONS-SCNC: 11 MMOL/L (ref 7–15)
AST SERPL W P-5'-P-CCNC: 22 U/L (ref 0–45)
BASOPHILS # BLD AUTO: 0 10E3/UL (ref 0–0.2)
BASOPHILS NFR BLD AUTO: 0 %
BILIRUB SERPL-MCNC: 0.4 MG/DL
BUN SERPL-MCNC: 22.4 MG/DL (ref 8–23)
CALCIUM SERPL-MCNC: 9.6 MG/DL (ref 8.8–10.4)
CHLORIDE SERPL-SCNC: 109 MMOL/L (ref 98–107)
CREAT SERPL-MCNC: 0.93 MG/DL (ref 0.51–0.95)
EGFRCR SERPLBLD CKD-EPI 2021: 61 ML/MIN/1.73M2
EOSINOPHIL # BLD AUTO: 0 10E3/UL (ref 0–0.7)
EOSINOPHIL NFR BLD AUTO: 0 %
ERYTHROCYTE [DISTWIDTH] IN BLOOD BY AUTOMATED COUNT: 19.7 % (ref 10–15)
GLUCOSE SERPL-MCNC: 105 MG/DL (ref 70–99)
HCO3 SERPL-SCNC: 22 MMOL/L (ref 22–29)
HCT VFR BLD AUTO: 37.2 % (ref 35–47)
HGB BLD-MCNC: 11.8 G/DL (ref 11.7–15.7)
IMM GRANULOCYTES # BLD: 0.1 10E3/UL
IMM GRANULOCYTES NFR BLD: 1 %
LYMPHOCYTES # BLD AUTO: 0.7 10E3/UL (ref 0.8–5.3)
LYMPHOCYTES NFR BLD AUTO: 8 %
MCH RBC QN AUTO: 29.9 PG (ref 26.5–33)
MCHC RBC AUTO-ENTMCNC: 31.7 G/DL (ref 31.5–36.5)
MCV RBC AUTO: 94 FL (ref 78–100)
MONOCYTES # BLD AUTO: 0.4 10E3/UL (ref 0–1.3)
MONOCYTES NFR BLD AUTO: 5 %
NEUTROPHILS # BLD AUTO: 7.9 10E3/UL (ref 1.6–8.3)
NEUTROPHILS NFR BLD AUTO: 86 %
NRBC # BLD AUTO: 0.1 10E3/UL
NRBC BLD AUTO-RTO: 1 /100
PLATELET # BLD AUTO: 314 10E3/UL (ref 150–450)
POTASSIUM SERPL-SCNC: 4.5 MMOL/L (ref 3.4–5.3)
PROT SERPL-MCNC: 7.1 G/DL (ref 6.4–8.3)
RBC # BLD AUTO: 3.94 10E6/UL (ref 3.8–5.2)
SODIUM SERPL-SCNC: 142 MMOL/L (ref 135–145)
WBC # BLD AUTO: 9.2 10E3/UL (ref 4–11)

## 2024-10-31 PROCEDURE — 250N000011 HC RX IP 250 OP 636: Performed by: REGISTERED NURSE

## 2024-10-31 PROCEDURE — 96411 CHEMO IV PUSH ADDL DRUG: CPT

## 2024-10-31 PROCEDURE — 84155 ASSAY OF PROTEIN SERUM: CPT | Performed by: REGISTERED NURSE

## 2024-10-31 PROCEDURE — 96372 THER/PROPH/DIAG INJ SC/IM: CPT | Performed by: REGISTERED NURSE

## 2024-10-31 PROCEDURE — 99213 OFFICE O/P EST LOW 20 MIN: CPT | Performed by: REGISTERED NURSE

## 2024-10-31 PROCEDURE — 96375 TX/PRO/DX INJ NEW DRUG ADDON: CPT

## 2024-10-31 PROCEDURE — 36591 DRAW BLOOD OFF VENOUS DEVICE: CPT | Performed by: REGISTERED NURSE

## 2024-10-31 PROCEDURE — 258N000003 HC RX IP 258 OP 636: Performed by: REGISTERED NURSE

## 2024-10-31 PROCEDURE — 96413 CHEMO IV INFUSION 1 HR: CPT

## 2024-10-31 PROCEDURE — 96367 TX/PROPH/DG ADDL SEQ IV INF: CPT

## 2024-10-31 PROCEDURE — 96415 CHEMO IV INFUSION ADDL HR: CPT

## 2024-10-31 PROCEDURE — 96377 APPLICATON ON-BODY INJECTOR: CPT | Mod: 59 | Performed by: REGISTERED NURSE

## 2024-10-31 PROCEDURE — 250N000013 HC RX MED GY IP 250 OP 250 PS 637: Performed by: REGISTERED NURSE

## 2024-10-31 PROCEDURE — G0463 HOSPITAL OUTPT CLINIC VISIT: HCPCS | Mod: 25 | Performed by: REGISTERED NURSE

## 2024-10-31 PROCEDURE — G2211 COMPLEX E/M VISIT ADD ON: HCPCS | Performed by: REGISTERED NURSE

## 2024-10-31 PROCEDURE — 96417 CHEMO IV INFUS EACH ADDL SEQ: CPT

## 2024-10-31 PROCEDURE — 85004 AUTOMATED DIFF WBC COUNT: CPT | Performed by: REGISTERED NURSE

## 2024-10-31 PROCEDURE — 82947 ASSAY GLUCOSE BLOOD QUANT: CPT | Performed by: REGISTERED NURSE

## 2024-10-31 PROCEDURE — 85014 HEMATOCRIT: CPT | Performed by: REGISTERED NURSE

## 2024-10-31 RX ORDER — DOXORUBICIN HYDROCHLORIDE 2 MG/ML
25 INJECTION, SOLUTION INTRAVENOUS ONCE
Status: CANCELLED | OUTPATIENT
Start: 2024-11-01

## 2024-10-31 RX ORDER — PALONOSETRON 0.05 MG/ML
0.25 INJECTION, SOLUTION INTRAVENOUS ONCE
Status: COMPLETED | OUTPATIENT
Start: 2024-10-31 | End: 2024-10-31

## 2024-10-31 RX ORDER — MEPERIDINE HYDROCHLORIDE 25 MG/ML
25 INJECTION INTRAMUSCULAR; INTRAVENOUS; SUBCUTANEOUS
Status: CANCELLED | OUTPATIENT
Start: 2024-11-01

## 2024-10-31 RX ORDER — METHYLPREDNISOLONE SODIUM SUCCINATE 40 MG/ML
40 INJECTION INTRAMUSCULAR; INTRAVENOUS
Status: CANCELLED
Start: 2024-11-01

## 2024-10-31 RX ORDER — LORAZEPAM 2 MG/ML
0.5 INJECTION INTRAMUSCULAR EVERY 4 HOURS PRN
Status: CANCELLED | OUTPATIENT
Start: 2024-11-01

## 2024-10-31 RX ORDER — DIPHENHYDRAMINE HCL 25 MG
50 CAPSULE ORAL ONCE
Status: CANCELLED
Start: 2024-11-01

## 2024-10-31 RX ORDER — HEPARIN SODIUM (PORCINE) LOCK FLUSH IV SOLN 100 UNIT/ML 100 UNIT/ML
5 SOLUTION INTRAVENOUS
Status: DISCONTINUED | OUTPATIENT
Start: 2024-10-31 | End: 2024-10-31 | Stop reason: HOSPADM

## 2024-10-31 RX ORDER — HEPARIN SODIUM,PORCINE 10 UNIT/ML
5-20 VIAL (ML) INTRAVENOUS DAILY PRN
Status: CANCELLED | OUTPATIENT
Start: 2024-11-01

## 2024-10-31 RX ORDER — ALBUTEROL SULFATE 90 UG/1
1-2 INHALANT RESPIRATORY (INHALATION)
Status: CANCELLED
Start: 2024-11-01

## 2024-10-31 RX ORDER — ACETAMINOPHEN 325 MG/1
650 TABLET ORAL ONCE
Status: COMPLETED | OUTPATIENT
Start: 2024-10-31 | End: 2024-10-31

## 2024-10-31 RX ORDER — HEPARIN SODIUM (PORCINE) LOCK FLUSH IV SOLN 100 UNIT/ML 100 UNIT/ML
500 SOLUTION INTRAVENOUS ONCE
Status: COMPLETED | OUTPATIENT
Start: 2024-10-31 | End: 2024-10-31

## 2024-10-31 RX ORDER — HEPARIN SODIUM (PORCINE) LOCK FLUSH IV SOLN 100 UNIT/ML 100 UNIT/ML
5 SOLUTION INTRAVENOUS
Status: CANCELLED | OUTPATIENT
Start: 2024-11-01

## 2024-10-31 RX ORDER — PALONOSETRON 0.05 MG/ML
0.25 INJECTION, SOLUTION INTRAVENOUS ONCE
Status: CANCELLED
Start: 2024-11-01

## 2024-10-31 RX ORDER — MEPERIDINE HYDROCHLORIDE 25 MG/ML
25 INJECTION INTRAMUSCULAR; INTRAVENOUS; SUBCUTANEOUS
Status: CANCELLED
Start: 2024-11-01

## 2024-10-31 RX ORDER — ACETAMINOPHEN 325 MG/1
650 TABLET ORAL ONCE
Status: CANCELLED
Start: 2024-11-01

## 2024-10-31 RX ORDER — DIPHENHYDRAMINE HCL 25 MG
50 CAPSULE ORAL ONCE
Status: COMPLETED | OUTPATIENT
Start: 2024-10-31 | End: 2024-10-31

## 2024-10-31 RX ORDER — DIPHENHYDRAMINE HYDROCHLORIDE 50 MG/ML
25 INJECTION INTRAMUSCULAR; INTRAVENOUS
Status: CANCELLED
Start: 2024-11-01

## 2024-10-31 RX ORDER — EPINEPHRINE 1 MG/ML
0.3 INJECTION, SOLUTION INTRAMUSCULAR; SUBCUTANEOUS EVERY 5 MIN PRN
Status: CANCELLED | OUTPATIENT
Start: 2024-11-01

## 2024-10-31 RX ORDER — DIPHENHYDRAMINE HYDROCHLORIDE 50 MG/ML
50 INJECTION INTRAMUSCULAR; INTRAVENOUS
Status: CANCELLED
Start: 2024-11-01

## 2024-10-31 RX ORDER — METHYLPREDNISOLONE SODIUM SUCCINATE 125 MG/2ML
125 INJECTION INTRAMUSCULAR; INTRAVENOUS
Status: CANCELLED
Start: 2024-11-01

## 2024-10-31 RX ORDER — DOXORUBICIN HYDROCHLORIDE 2 MG/ML
25 INJECTION, SOLUTION INTRAVENOUS ONCE
Status: COMPLETED | OUTPATIENT
Start: 2024-10-31 | End: 2024-10-31

## 2024-10-31 RX ORDER — ALBUTEROL SULFATE 0.83 MG/ML
2.5 SOLUTION RESPIRATORY (INHALATION)
Status: CANCELLED | OUTPATIENT
Start: 2024-11-01

## 2024-10-31 RX ADMIN — HEPARIN SODIUM (PORCINE) LOCK FLUSH IV SOLN 100 UNIT/ML 5 ML: 100 SOLUTION at 13:14

## 2024-10-31 RX ADMIN — RITUXIMAB-ABBS 800 MG: 10 INJECTION, SOLUTION INTRAVENOUS at 11:42

## 2024-10-31 RX ADMIN — CYCLOPHOSPHAMIDE 890 MG: 1 INJECTION, POWDER, FOR SOLUTION INTRAVENOUS; ORAL at 11:10

## 2024-10-31 RX ADMIN — SODIUM CHLORIDE 100 ML: 9 INJECTION, SOLUTION INTRAVENOUS at 10:51

## 2024-10-31 RX ADMIN — Medication 500 UNITS: at 09:15

## 2024-10-31 RX ADMIN — PEGFILGRASTIM 6 MG: KIT SUBCUTANEOUS at 11:46

## 2024-10-31 RX ADMIN — DIPHENHYDRAMINE HYDROCHLORIDE 50 MG: 25 CAPSULE ORAL at 11:06

## 2024-10-31 RX ADMIN — VINCRISTINE SULFATE 1 MG: 1 INJECTION, SOLUTION INTRAVENOUS at 10:59

## 2024-10-31 RX ADMIN — PALONOSETRON HYDROCHLORIDE 0.25 MG: 0.25 INJECTION INTRAVENOUS at 10:27

## 2024-10-31 RX ADMIN — DOXORUBICIN HYDROCHLORIDE 60 MG: 2 INJECTION, SOLUTION INTRAVENOUS at 10:52

## 2024-10-31 RX ADMIN — FOSAPREPITANT 150 MG: 150 INJECTION, POWDER, LYOPHILIZED, FOR SOLUTION INTRAVENOUS at 10:28

## 2024-10-31 RX ADMIN — ACETAMINOPHEN 650 MG: 325 TABLET ORAL at 11:06

## 2024-10-31 ASSESSMENT — PAIN SCALES - GENERAL: PAINLEVEL_OUTOF10: NO PAIN (0)

## 2024-10-31 NOTE — PROGRESS NOTES
Meenu Marroquin is a 84 year old, presenting for the following health issues:  DLBCL    HPI     Oncology History Overview Note   This is an 85 y/o female with a PMH of pAF and saddle PE 2021 on Xarelto, HLD, HTN, TIA, OA, autoimmune hepatitis (on azathioprine), IgM/IgG kappa MGUS (dx 11/2017, follows with Dr. Partha Sherman at [facility]), papillary transitional renal cell carcinoma (s/p cryoablation 6/7/2018), hyperparathyroidism s/p adenoma removal 12/2019, coming in for bx positive DLBCL germinal cell subtype with lytic lesions.     Patient presented to the ED 7/17/2024 with severe and progressive mid-back pain x1 week without recent falls or injuries. Initial imaging revealed lytic lesion of left T12 transverse process and T11 fracture. She was admitted for pain control and further work-up. Additional imaging (CT aortic survey, MRI thoracolumbar, bone scan) had evidence of widespread bony metastasis on thoracolumbar spine, proximal sacrum, bilateral ischium, ribs, and possibly L femur w/o clear evidence of primary source. She completed an outpatient bone bx 7/30 via neurosurgery.    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 The Ki-67 proliferation index is estimated to be 50-60% .  Echo shows preserved EF of 60 to 65%.  HIV hepatitis B negative.  FISH is pending for Bcl-2 and MYC rearrangement.  PET scan showsNumerous scattered hypermetabolic lesions throughout axial skeleton largest on most FDG avid is T11.  There is current extension into spinal canal and 2 neural foramina for T10/T11 and T11/T12.  There is a hypermetabolic lesion on the liver that might be related to lymphoma.  Numerous hypermetabolic lesions in appendicular skeleton including the left humeral head and right mid femur.  Hypermetabolic activity in the short segment narrowing of sigmoid colon.  This scan was discussed with  radiologist.  Due to the fact that PET scan is with CT without contrast, reliable assessment of spinal nerve roots was not done and MRI was recommended.  Patient did not have any weakness or neuropathy or sciatica concerning for nerve root involvement.  Patient is quite symptomatic from her disease in terms of bone pains and require treatment in time sensitive manner.  Considering patient's age I do not think patient can tolerate concurrent M R-CHOP.  Majority of patient's disease burden is symptomatic and external compression to nerve root and dural involvement can also be treated by R-CHOP.  So I elected to proceed with R mini CHOP therapy.      She received 2 cycles, tolerated it well. PET scan after 2 cycles shows near CR with residual mild activity on T11- SUV 5.8     Diffuse large B-cell lymphoma of extranodal site   8/16/2024 Initial Diagnosis    Diffuse large B-cell lymphoma of extranodal site (H)     8/28/2024 -  Chemotherapy    OP ONC Non-Hodgkin's Lymphoma - R-CHOP  Plan Provider: Darya Yu MD  Treatment goal: Curative  Line of treatment: First Line       Feels like she is tolerating therapy well so far.  Energy was lower after this past cycle but recovered after a few days. Appetite is stable.  Denies nausea, diarrhea, constipation.          Objective    /80 (BP Location: Right arm, Patient Position: Sitting, Cuff Size: Adult Large)   Pulse 78   Temp 98.1  F (36.7  C) (Oral)   Resp 16   Wt 107.1 kg (236 lb 1.6 oz)   SpO2 94%   BMI 38.86 kg/m    Body mass index is 38.86 kg/m .  Physical Exam   GENERAL:  Alert oriented well nourished  HEAD: normocephalic atraumatic  SKIN:  no rash, hives, other lesions.  LYMPHATIC: no abnormal lymph nodes palable in cervical, axillary, supraclavicular or inguinal area.  RESP:  No rales or rhonchi, breath sounds bilaterally equal and vesicular  CV:  No tachycardia, S1 S2 normal No murmur.  GI:  Abdomen soft nontender no hepato or  splenomegaly  MUSCULOSKELETAL:  No visible joint redness or swelling.  NEURO:  No gross weakness gait normal  PSYCH: pleasant affect    Labs:  I personally reviewed the following labs:    Most Recent 3 CBC's:  Recent Labs   Lab Test 10/31/24  0913 10/11/24  1028 09/18/24  1608   WBC 9.2 6.2 7.5   HGB 11.8 11.4* 12.4   MCV 94 93 91    301 328     Most Recent 3 BMP's:  Recent Labs   Lab Test 10/11/24  1028 09/18/24  1608 09/04/24  1557    137 136   POTASSIUM 4.1 4.4 4.5   CHLORIDE 108* 105 102   CO2 24 23 20*   BUN 21.3 18.0 24.8*   CR 0.85 0.79 0.73   ANIONGAP 9 9 14   TREVOR 9.5 9.7 9.7   GLC 91 100* 132*     Most Recent 2 LFT's:  Recent Labs   Lab Test 10/11/24  1028 09/18/24  1608   AST 19 18   ALT 7 8   ALKPHOS 158* 188*   BILITOTAL 0.5 0.7       Assessment and Plan:    1) Diffuse large B cell lymphoma:  - She is tolerating treatment very well. She has excellent response to chemotherapy  so far. We will continue treatment for total 6 cycles.  - No major toxicities of chemotherapy. Continue to monitor.  - Okay to proceed with C4 today 10/31/24     20 minutes spent on the date of the encounter doing chart review, review of test results, interpretation of tests, patient visit, and documentation     The longitudinal plan of care for the diagnosis(es)/condition(s) as documented were addressed during this visit. Due to the added complexity in care, I will continue to support Cara in the subsequent management and with ongoing continuity of care.    VALENTINA Ewing CNP  [facility] [address] [address] [phone]

## 2024-10-31 NOTE — NURSING NOTE
"Oncology Rooming Note    October 31, 2024 9:28 AM   Cara Cool is a 84 year old female who presents for:    Chief Complaint   Patient presents with    Port Draw     Labs drawn via port by RN in lab.  VS taken    Oncology Clinic Visit     RTN Non hodgkins Lymphoma      Initial Vitals: /80 (BP Location: Right arm, Patient Position: Sitting, Cuff Size: Adult Large)   Pulse 78   Temp 98.1  F (36.7  C) (Oral)   Resp 16   Wt 107.1 kg (236 lb 1.6 oz)   SpO2 94%   BMI 38.86 kg/m   Estimated body mass index is 38.86 kg/m  as calculated from the following:    Height as of 8/28/24: 1.66 m (5' 5.35\").    Weight as of this encounter: 107.1 kg (236 lb 1.6 oz). Body surface area is 2.22 meters squared.  No Pain (0) Comment: Data Unavailable   No LMP recorded. Patient has had a hysterectomy.  Allergies reviewed: Yes  Medications reviewed: Yes    Medications: Medication refills not needed today.  Pharmacy name entered into [employer]:    [facility] - [place], [place] - [address] [place] [address] DRUG STORE #[id number] - [place], [place] - [address] [place], [place] [address]A AVE AT [address]    Frailty Screening:   Is the patient here for a new oncology consult visit in cancer care? 2. No      Clinical concerns: none      Olamide Newman             "

## 2024-10-31 NOTE — LETTER
10/31/2024      Cara Cool  [address] [place] [id number]      Dear Colleague,    Thank you for referring your patient, Cara Cool, to the [facility]. Please see a copy of my visit note below.    Subjective  Cara is a 84 year old, presenting for the following health issues:  DLBCL    HPI     Oncology History Overview Note   This is an 83 y/o female with a PMH of pAF and saddle PE 2021 on Xarelto, HLD, HTN, TIA, OA, autoimmune hepatitis (on azathioprine), IgM/IgG kappa MGUS (dx 11/2017, follows with Dr. Partha [facility] at [facility]), papillary transitional renal cell carcinoma (s/p cryoablation 6/7/2018), hyperparathyroidism s/p adenoma removal 12/2019, coming in for bx positive DLBCL germinal cell subtype with lytic lesions.     Patient presented to the ED 7/17/2024 with severe and progressive mid-back pain x1 week without recent falls or injuries. Initial imaging revealed lytic lesion of left T12 transverse process and T11 fracture. She was admitted for pain control and further work-up. Additional imaging (CT aortic survey, MRI thoracolumbar, bone scan) had evidence of widespread bony metastasis on thoracolumbar spine, proximal sacrum, bilateral ischium, ribs, and possibly L femur w/o clear evidence of primary source. She completed an outpatient bone bx 7/30 via neurosurgery.    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 The Ki-67 proliferation index is estimated to be 50-60% .  Echo shows preserved EF of 60 to 65%.  HIV hepatitis B negative.  FISH is pending for Bcl-2 and MYC rearrangement.  PET scan showsNumerous scattered hypermetabolic lesions throughout axial skeleton largest on most FDG avid is T11.  There is current extension into spinal canal and 2 neural foramina for T10/T11 and T11/T12.  There is a hypermetabolic lesion on the liver that might  be related to lymphoma.  Numerous hypermetabolic lesions in appendicular skeleton including the left humeral head and right mid femur.  Hypermetabolic activity in the short segment narrowing of sigmoid colon.  This scan was discussed with radiologist.  Due to the fact that PET scan is with CT without contrast, reliable assessment of spinal nerve roots was not done and MRI was recommended.  Patient did not have any weakness or neuropathy or sciatica concerning for nerve root involvement.  Patient is quite symptomatic from her disease in terms of bone pains and require treatment in time sensitive manner.  Considering patient's age I do not think patient can tolerate concurrent M R-CHOP.  Majority of patient's disease burden is symptomatic and external compression to nerve root and dural involvement can also be treated by R-CHOP.  So I elected to proceed with R mini CHOP therapy.      She received 2 cycles, tolerated it well. PET scan after 2 cycles shows near CR with residual mild activity on T11- SUV 5.8     Diffuse large B-cell lymphoma of extranodal site   8/16/2024 Initial Diagnosis    Diffuse large B-cell lymphoma of extranodal site (H)     8/28/2024 -  Chemotherapy    OP ONC Non-Hodgkin's Lymphoma - R-CHOP  Plan Provider: Darya Yu MD  Treatment goal: Curative  Line of treatment: First Line       Feels like she is tolerating therapy well so far.  Energy was lower after this past cycle but recovered after a few days. Appetite is stable.  Denies nausea, diarrhea, constipation.          Objective   /80 (BP Location: Right arm, Patient Position: Sitting, Cuff Size: Adult Large)   Pulse 78   Temp 98.1  F (36.7  C) (Oral)   Resp 16   Wt 107.1 kg (236 lb 1.6 oz)   SpO2 94%   BMI 38.86 kg/m    Body mass index is 38.86 kg/m .  Physical Exam   GENERAL:  Alert oriented well nourished  HEAD: normocephalic atraumatic  SKIN:  no rash, hives, other lesions.  LYMPHATIC: no abnormal lymph nodes palable in  cervical, axillary, supraclavicular or inguinal area.  RESP:  No rales or rhonchi, breath sounds bilaterally equal and vesicular  CV:  No tachycardia, S1 S2 normal No murmur.  GI:  Abdomen soft nontender no hepato or splenomegaly  MUSCULOSKELETAL:  No visible joint redness or swelling.  NEURO:  No gross weakness gait normal  PSYCH: pleasant affect    Labs:  I personally reviewed the following labs:    Most Recent 3 CBC's:  Recent Labs   Lab Test 10/31/24  0913 10/11/24  1028 09/18/24  1608   WBC 9.2 6.2 7.5   HGB 11.8 11.4* 12.4   MCV 94 93 91    301 328     Most Recent 3 BMP's:  Recent Labs   Lab Test 10/11/24  1028 09/18/24  1608 09/04/24  1557    137 136   POTASSIUM 4.1 4.4 4.5   CHLORIDE 108* 105 102   CO2 24 23 20*   BUN 21.3 18.0 24.8*   CR 0.85 0.79 0.73   ANIONGAP 9 9 14   TREVOR 9.5 9.7 9.7   GLC 91 100* 132*     Most Recent 2 LFT's:  Recent Labs   Lab Test 10/11/24  1028 09/18/24  1608   AST 19 18   ALT 7 8   ALKPHOS 158* 188*   BILITOTAL 0.5 0.7       Assessment and Plan:    1) Diffuse large B cell lymphoma:  - She is tolerating treatment very well. She has excellent response to chemotherapy  so far. We will continue treatment for total 6 cycles.  - No major toxicities of chemotherapy. Continue to monitor.  - Okay to proceed with C4 today 10/31/24     20 minutes spent on the date of the encounter doing chart review, review of test results, interpretation of tests, patient visit, and documentation     The longitudinal plan of care for the diagnosis(es)/condition(s) as documented were addressed during this visit. Due to the added complexity in care, I will continue to support Cara in the subsequent management and with ongoing continuity of care.    VALENTINA Ewing CNP  [facility] [address] [address] [phone]      Again, thank you for allowing me to participate in the care of your patient.        Sincerely,        VALENTINA Acosta CNP

## 2024-10-31 NOTE — PROGRESS NOTES
Infusion Nursing Note:  Cara Cool presents today for C4D1 Doxorubicin/Vincristine/Cytoxan/Rituxan/OnBody Neulasta.    Patient seen by provider today: Yes: Tania Coburn NP   present during visit today: Not Applicable.    Note: PO Prednisone taken prior to arrival      Intravenous Access:  Implanted Port.    Treatment Conditions:  Lab Results   Component Value Date    HGB 11.8 10/31/2024    WBC 9.2 10/31/2024    ANEU 3.3 09/28/2021    ANEUTAUTO 7.9 10/31/2024     10/31/2024        Lab Results   Component Value Date     10/31/2024    POTASSIUM 4.5 10/31/2024    MAG 1.7 07/17/2024    CR 0.93 10/31/2024    TREVOR 9.6 10/31/2024    BILITOTAL 0.4 10/31/2024    ALBUMIN 3.9 10/31/2024    ALT 9 10/31/2024    AST 22 10/31/2024       Results reviewed, labs MET treatment parameters, ok to proceed with treatment.  ECHO/MUGA completed 7/18/24  EF >70%.    Neulasta Onpro On-Body injector applied to left lower abdomen at 10/31 1200h with light facing belly button.  Writer discussed Neulasta injection would start on 11/1/24 at 1500, approximately 27 hours after application applied today.  Written and Verbal instruction reviewed with patient.  Pt instructed when the dose delivery starts, it will take about 45 minutes to complete.  Pt aware Neulasta Onpro On-Body should have green flashing light and to call triage or on-call MD if injector flashes red or appears to be leaking. Pt aware to keep Onpro On-Body Neulasta 4 inches away from electrical equipment and to avoid showering 4 hours prior to injection.   Neulasta Onpro Lot number: [id number]       Post Infusion Assessment:  Patient tolerated infusion without incident.  Patient tolerated injection without incident.  Blood return noted pre and post infusion.  Blood return noted during administration every 2-5 cc.  Site patent and intact, free from redness, edema or discomfort.  No evidence of extravasations.  Access discontinued per protocol.       Discharge  Plan:   Patient declined prescription refills. Has enough supply of Prednisone.  Discharge instructions reviewed with: Patient.  Patient and/or family verbalized understanding of discharge instructions and all questions answered.  AVS to patient via [employer].  Patient will return 11/21 for next appointment.   Patient discharged in stable condition accompanied by: self.  Departure Mode: Ambulatory and Wheelchair.      MICHI FRANCOIS RN

## 2024-10-31 NOTE — NURSING NOTE
"Chief Complaint   Patient presents with    Port Draw     Labs drawn via port by RN in lab.  VS taken       Port accessed with 20 gauge 3/4\" Power needle by RN, labs collected, line flushed with saline and heparin.  Vitals taken. Pt checked in for appointment(s).    Ginna Dillard RN    "

## 2024-11-07 DIAGNOSIS — C83.398 DIFFUSE LARGE B-CELL LYMPHOMA OF EXTRANODAL SITE: Primary | ICD-10-CM

## 2024-11-07 PROCEDURE — 94642 AEROSOL INHALATION TREATMENT: CPT | Performed by: INTERNAL MEDICINE

## 2024-11-07 PROCEDURE — 94640 AIRWAY INHALATION TREATMENT: CPT | Performed by: INTERNAL MEDICINE

## 2024-11-07 RX ORDER — DIPHENHYDRAMINE HYDROCHLORIDE 50 MG/ML
50 INJECTION INTRAMUSCULAR; INTRAVENOUS
Start: 2024-11-22

## 2024-11-07 RX ORDER — ALBUTEROL SULFATE 0.83 MG/ML
2.5 SOLUTION RESPIRATORY (INHALATION) ONCE
Status: COMPLETED | OUTPATIENT
Start: 2024-11-07 | End: 2024-11-07

## 2024-11-07 RX ORDER — EPINEPHRINE 1 MG/ML
0.3 INJECTION, SOLUTION, CONCENTRATE INTRAVENOUS EVERY 5 MIN PRN
OUTPATIENT
Start: 2024-11-22

## 2024-11-07 RX ORDER — PENTAMIDINE ISETHIONATE 300 MG/300MG
300 INHALANT RESPIRATORY (INHALATION) ONCE
Status: COMPLETED | OUTPATIENT
Start: 2024-11-07 | End: 2024-11-07

## 2024-11-07 RX ORDER — ALBUTEROL SULFATE 0.83 MG/ML
2.5 SOLUTION RESPIRATORY (INHALATION)
OUTPATIENT
Start: 2024-11-22

## 2024-11-07 RX ORDER — METHYLPREDNISOLONE SODIUM SUCCINATE 40 MG/ML
40 INJECTION INTRAMUSCULAR; INTRAVENOUS
Start: 2024-11-22

## 2024-11-07 RX ORDER — DIPHENHYDRAMINE HYDROCHLORIDE 50 MG/ML
25 INJECTION INTRAMUSCULAR; INTRAVENOUS
Start: 2024-11-22

## 2024-11-07 RX ORDER — ALBUTEROL SULFATE 0.83 MG/ML
2.5 SOLUTION RESPIRATORY (INHALATION) ONCE
Start: 2024-11-22 | End: 2024-11-22

## 2024-11-07 RX ORDER — ALBUTEROL SULFATE 90 UG/1
1-2 INHALANT RESPIRATORY (INHALATION)
Start: 2024-11-22

## 2024-11-07 RX ORDER — PENTAMIDINE ISETHIONATE 300 MG/300MG
300 INHALANT RESPIRATORY (INHALATION) ONCE
Start: 2024-11-22 | End: 2024-11-22

## 2024-11-07 RX ORDER — MEPERIDINE HYDROCHLORIDE 25 MG/ML
25 INJECTION INTRAMUSCULAR; INTRAVENOUS; SUBCUTANEOUS
OUTPATIENT
Start: 2024-11-22

## 2024-11-07 RX ADMIN — PENTAMIDINE ISETHIONATE 300 MG: 300 INHALANT RESPIRATORY (INHALATION) at 10:51

## 2024-11-07 RX ADMIN — ALBUTEROL SULFATE 2.5 MG: 0.83 SOLUTION RESPIRATORY (INHALATION) at 10:50

## 2024-11-07 NOTE — PROGRESS NOTES
Cara Cool  was seen today for a Pentamidine nebulizer tx ordered by Dr. Darya Yu.    Patient was first given 2.5 mg of albuterol nebulizer, after which Pentamidine 300 mg mixed with 6cc Sterile H20 was administered through a filtered nebulizer.      No adverse side effects noted by the patient.    This service today was provided with Dr. May Suarez, a physician on duty, who was available if needed.     Procedure was completed by Carlos Alberto Bill.

## 2024-11-20 NOTE — PROGRESS NOTES
[facility]  Return Visit  Nov 21, 2024      Meenu Marroquin is an 84 year old, presenting for the following health issues:  DLBCL         Oncology History Overview Note   This is an 83 y/o female with a PMH of pAF and saddle PE 2021 on Xarelto, HLD, HTN, TIA, OA, autoimmune hepatitis (on azathioprine), IgM/IgG kappa MGUS (dx 11/2017, follows with Dr. Partha Sherman at [facility]), papillary transitional renal cell carcinoma (s/p cryoablation 6/7/2018), hyperparathyroidism s/p adenoma removal 12/2019, coming in for bx positive DLBCL germinal cell subtype with lytic lesions.     Patient presented to the ED 7/17/2024 with severe and progressive mid-back pain x1 week without recent falls or injuries. Initial imaging revealed lytic lesion of left T12 transverse process and T11 fracture. She was admitted for pain control and further work-up. Additional imaging (CT aortic survey, MRI thoracolumbar, bone scan) had evidence of widespread bony metastasis on thoracolumbar spine, proximal sacrum, bilateral ischium, ribs, and possibly L femur w/o clear evidence of primary source. She completed an outpatient bone bx 7/30 via neurosurgery.    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 The Ki-67 proliferation index is estimated to be 50-60% .  Echo shows preserved EF of 60 to 65%.  HIV hepatitis B negative.  FISH is pending for Bcl-2 and MYC rearrangement.  PET scan showsNumerous scattered hypermetabolic lesions throughout axial skeleton largest on most FDG avid is T11.  There is current extension into spinal canal and 2 neural foramina for T10/T11 and T11/T12.  There is a hypermetabolic lesion on the liver that might be related to lymphoma.  Numerous hypermetabolic lesions in appendicular skeleton including the left humeral head and right mid femur.  Hypermetabolic activity in the short segment narrowing  of sigmoid colon.  This scan was discussed with radiologist.  Due to the fact that PET scan is with CT without contrast, reliable assessment of spinal nerve roots was not done and MRI was recommended.  Patient did not have any weakness or neuropathy or sciatica concerning for nerve root involvement.  Patient is quite symptomatic from her disease in terms of bone pains and require treatment in time sensitive manner.  Considering patient's age I do not think patient can tolerate concurrent M R-CHOP.  Majority of patient's disease burden is symptomatic and external compression to nerve root and dural involvement can also be treated by R-CHOP.  So I elected to proceed with R mini CHOP therapy.      She received 2 cycles, tolerated it well. PET scan after 2 cycles shows near CR with residual mild activity on T11- SUV 5.8     Diffuse large B-cell lymphoma of extranodal site   8/16/2024 Initial Diagnosis    Diffuse large B-cell lymphoma of extranodal site (H)     8/28/2024 -  Chemotherapy    OP ONC Non-Hodgkin's Lymphoma - R-CHOP  Plan Provider: Darya Yu MD  Treatment goal: Curative  Line of treatment: First Line       Interval history-  Cara is here today for follow up prior to treatment.   She is tolerating treatment very well. Energy has been stable. She has good appetite/ good oral hydration. Denies nausea or bowel complaints. She has noticed darkening pigmentation under her nails increased, but no peeling or cracking noted. No further skin concerns. No bleeding/ bruising. No pain. No numbness or tingling.   ROS otherwise neg.         Objective    Physical Exam:  /72   Pulse 71   Temp 97.8  F (36.6  C)   Resp 16   Wt 106.6 kg (235 lb)   SpO2 98%   BMI 38.68 kg/m    General: No acute distress.  HEENT: EOMI, PERRL. Sclerae are anicteric. Oral mucosa is pink and moist with no lesions or thrush.   Lymph: Neck is supple with no lymphadenopathy in the cervical or supraclavicular areas.   Heart: Regular  rate and rhythm.   Lungs: Clear to auscultation bilaterally.   Abdomen: Bowel sounds present, soft, nontender with no palpable hepatosplenomegaly or masses.   Extremities: No lower extremity edema noted bilaterally.   Neuro: Alert and oriented x3, CN grossly intact, steady gait  Skin: No rashes, petechiae, or bruising noted on exposed skin.    Wt Readings from Last 4 Encounters:   11/21/24 106.6 kg (235 lb)   10/31/24 107.1 kg (236 lb 1.6 oz)   10/14/24 107 kg (235 lb 14.4 oz)   10/11/24 107.5 kg (237 lb)         Labs:  I personally reviewed the following labs:    Most Recent 3 CBC's:  Recent Labs   Lab Test 11/21/24  1123 10/31/24  0913 10/11/24  1028   WBC 8.4 9.2 6.2   HGB 11.6* 11.8 11.4*   MCV 96 94 93    314 301     Most Recent 3 BMP's:  Recent Labs   Lab Test 11/21/24  1123 10/31/24  0913 10/11/24  1028    142 141   POTASSIUM 4.6 4.5 4.1   CHLORIDE 109* 109* 108*   CO2 24 22 24   BUN 18.5 22.4 21.3   CR 0.84 0.93 0.85   ANIONGAP 9 11 9   TREVOR 9.5 9.6 9.5   GLC 98 105* 91     Most Recent 2 LFT's:  Recent Labs   Lab Test 11/21/24  1123 10/31/24  0913   AST 25 22   ALT 10 9   ALKPHOS 160* 161*   BILITOTAL 0.4 0.4       Assessment and Plan:    1) Diffuse large B cell lymphoma:  - She is tolerating treatment very well. She has excellent response to chemotherapy so far evidenced on her interval PET scan 10/4/24. We will continue treatment for total 6 cycles.  - No major toxicities of chemotherapy. Continue to monitor.  - Okay to proceed with C5 today 11/21/24   - RTC prior to C6  - EOT PET and Dr Yu    2) Thyroid nodule-- incidental finding on PET-- 2.5 cm Left sided -- consider thyroid US after completion of chemotherapy    35 minutes spent on the date of the encounter doing chart review, review of test results, interpretation of tests, patient visit, and documentation     The longitudinal plan of care for the diagnosis(es)/condition(s) as documented were addressed during this visit. Due to the  added complexity in care, I will continue to support Cara in the subsequent management and with ongoing continuity of care.    Cindi Potter ACNP-BC

## 2024-11-21 ENCOUNTER — ONCOLOGY VISIT (OUTPATIENT)
Dept: ONCOLOGY | Facility: CLINIC | Age: 84
End: 2024-11-21
Attending: STUDENT IN AN ORGANIZED HEALTH CARE EDUCATION/TRAINING PROGRAM
Payer: COMMERCIAL

## 2024-11-21 ENCOUNTER — APPOINTMENT (OUTPATIENT)
Dept: LAB | Facility: CLINIC | Age: 84
End: 2024-11-21
Attending: STUDENT IN AN ORGANIZED HEALTH CARE EDUCATION/TRAINING PROGRAM
Payer: COMMERCIAL

## 2024-11-21 VITALS
OXYGEN SATURATION: 98 % | TEMPERATURE: 97.8 F | WEIGHT: 235 LBS | HEART RATE: 71 BPM | RESPIRATION RATE: 16 BRPM | BODY MASS INDEX: 38.68 KG/M2 | DIASTOLIC BLOOD PRESSURE: 72 MMHG | SYSTOLIC BLOOD PRESSURE: 131 MMHG

## 2024-11-21 DIAGNOSIS — C83.398 DIFFUSE LARGE B-CELL LYMPHOMA OF EXTRANODAL SITE: Primary | ICD-10-CM

## 2024-11-21 DIAGNOSIS — Z51.11 ENCOUNTER FOR ANTINEOPLASTIC CHEMOTHERAPY: ICD-10-CM

## 2024-11-21 LAB
ACANTHOCYTES BLD QL SMEAR: SLIGHT
ALBUMIN SERPL BCG-MCNC: 4 G/DL (ref 3.5–5.2)
ALP SERPL-CCNC: 160 U/L (ref 40–150)
ALT SERPL W P-5'-P-CCNC: 10 U/L (ref 0–50)
ANION GAP SERPL CALCULATED.3IONS-SCNC: 9 MMOL/L (ref 7–15)
AST SERPL W P-5'-P-CCNC: 25 U/L (ref 0–45)
BASOPHILS # BLD MANUAL: 0 10E3/UL (ref 0–0.2)
BASOPHILS NFR BLD MANUAL: 0 %
BILIRUB SERPL-MCNC: 0.4 MG/DL
BUN SERPL-MCNC: 18.5 MG/DL (ref 8–23)
BURR CELLS BLD QL SMEAR: SLIGHT
CALCIUM SERPL-MCNC: 9.5 MG/DL (ref 8.8–10.4)
CHLORIDE SERPL-SCNC: 109 MMOL/L (ref 98–107)
CREAT SERPL-MCNC: 0.84 MG/DL (ref 0.51–0.95)
EGFRCR SERPLBLD CKD-EPI 2021: 69 ML/MIN/1.73M2
EOSINOPHIL # BLD MANUAL: 0 10E3/UL (ref 0–0.7)
EOSINOPHIL NFR BLD MANUAL: 0 %
ERYTHROCYTE [DISTWIDTH] IN BLOOD BY AUTOMATED COUNT: 19.7 % (ref 10–15)
FRAGMENTS BLD QL SMEAR: SLIGHT
GLUCOSE SERPL-MCNC: 98 MG/DL (ref 70–99)
HCO3 SERPL-SCNC: 24 MMOL/L (ref 22–29)
HCT VFR BLD AUTO: 36.3 % (ref 35–47)
HGB BLD-MCNC: 11.6 G/DL (ref 11.7–15.7)
LYMPHOCYTES # BLD MANUAL: 0.3 10E3/UL (ref 0.8–5.3)
LYMPHOCYTES NFR BLD MANUAL: 4 %
MCH RBC QN AUTO: 30.6 PG (ref 26.5–33)
MCHC RBC AUTO-ENTMCNC: 32 G/DL (ref 31.5–36.5)
MCV RBC AUTO: 96 FL (ref 78–100)
MONOCYTES # BLD MANUAL: 0.1 10E3/UL (ref 0–1.3)
MONOCYTES NFR BLD MANUAL: 1 %
NEUTROPHILS # BLD MANUAL: 8 10E3/UL (ref 1.6–8.3)
NEUTROPHILS NFR BLD MANUAL: 95 %
NRBC # BLD AUTO: 0.1 10E3/UL
NRBC BLD MANUAL-RTO: 1 %
PLAT MORPH BLD: ABNORMAL
PLATELET # BLD AUTO: 293 10E3/UL (ref 150–450)
POTASSIUM SERPL-SCNC: 4.6 MMOL/L (ref 3.4–5.3)
PROT SERPL-MCNC: 6.9 G/DL (ref 6.4–8.3)
RBC # BLD AUTO: 3.79 10E6/UL (ref 3.8–5.2)
RBC MORPH BLD: ABNORMAL
SODIUM SERPL-SCNC: 142 MMOL/L (ref 135–145)
WBC # BLD AUTO: 8.4 10E3/UL (ref 4–11)

## 2024-11-21 PROCEDURE — 85027 COMPLETE CBC AUTOMATED: CPT | Performed by: NURSE PRACTITIONER

## 2024-11-21 PROCEDURE — 250N000011 HC RX IP 250 OP 636: Performed by: NURSE PRACTITIONER

## 2024-11-21 PROCEDURE — 85007 BL SMEAR W/DIFF WBC COUNT: CPT | Performed by: NURSE PRACTITIONER

## 2024-11-21 PROCEDURE — 36591 DRAW BLOOD OFF VENOUS DEVICE: CPT | Performed by: NURSE PRACTITIONER

## 2024-11-21 PROCEDURE — 250N000013 HC RX MED GY IP 250 OP 250 PS 637: Performed by: NURSE PRACTITIONER

## 2024-11-21 PROCEDURE — 82247 BILIRUBIN TOTAL: CPT | Performed by: NURSE PRACTITIONER

## 2024-11-21 PROCEDURE — G0463 HOSPITAL OUTPT CLINIC VISIT: HCPCS | Performed by: NURSE PRACTITIONER

## 2024-11-21 PROCEDURE — 80053 COMPREHEN METABOLIC PANEL: CPT | Performed by: NURSE PRACTITIONER

## 2024-11-21 PROCEDURE — 82310 ASSAY OF CALCIUM: CPT | Performed by: NURSE PRACTITIONER

## 2024-11-21 PROCEDURE — 96372 THER/PROPH/DIAG INJ SC/IM: CPT | Performed by: NURSE PRACTITIONER

## 2024-11-21 PROCEDURE — 258N000003 HC RX IP 258 OP 636: Performed by: NURSE PRACTITIONER

## 2024-11-21 RX ORDER — HEPARIN SODIUM (PORCINE) LOCK FLUSH IV SOLN 100 UNIT/ML 100 UNIT/ML
5 SOLUTION INTRAVENOUS
Status: DISCONTINUED | OUTPATIENT
Start: 2024-11-21 | End: 2024-11-21 | Stop reason: HOSPADM

## 2024-11-21 RX ORDER — METHYLPREDNISOLONE SODIUM SUCCINATE 125 MG/2ML
125 INJECTION INTRAMUSCULAR; INTRAVENOUS
Status: CANCELLED
Start: 2024-11-22

## 2024-11-21 RX ORDER — LORAZEPAM 2 MG/ML
0.5 INJECTION INTRAMUSCULAR EVERY 4 HOURS PRN
Status: CANCELLED | OUTPATIENT
Start: 2024-11-22

## 2024-11-21 RX ORDER — HEPARIN SODIUM,PORCINE 10 UNIT/ML
5-20 VIAL (ML) INTRAVENOUS DAILY PRN
Status: CANCELLED | OUTPATIENT
Start: 2024-11-22

## 2024-11-21 RX ORDER — DOXORUBICIN HYDROCHLORIDE 2 MG/ML
25 INJECTION, SOLUTION INTRAVENOUS ONCE
Status: CANCELLED | OUTPATIENT
Start: 2024-11-22

## 2024-11-21 RX ORDER — ACETAMINOPHEN 325 MG/1
650 TABLET ORAL ONCE
Status: COMPLETED | OUTPATIENT
Start: 2024-11-21 | End: 2024-11-21

## 2024-11-21 RX ORDER — EPINEPHRINE 1 MG/ML
0.3 INJECTION, SOLUTION INTRAMUSCULAR; SUBCUTANEOUS EVERY 5 MIN PRN
Status: CANCELLED | OUTPATIENT
Start: 2024-11-22

## 2024-11-21 RX ORDER — PALONOSETRON 0.05 MG/ML
0.25 INJECTION, SOLUTION INTRAVENOUS ONCE
Status: CANCELLED
Start: 2024-11-22

## 2024-11-21 RX ORDER — MEPERIDINE HYDROCHLORIDE 25 MG/ML
25 INJECTION INTRAMUSCULAR; INTRAVENOUS; SUBCUTANEOUS
Status: CANCELLED | OUTPATIENT
Start: 2024-11-22

## 2024-11-21 RX ORDER — ALBUTEROL SULFATE 90 UG/1
1-2 INHALANT RESPIRATORY (INHALATION)
Status: CANCELLED
Start: 2024-11-22

## 2024-11-21 RX ORDER — PALONOSETRON 0.05 MG/ML
0.25 INJECTION, SOLUTION INTRAVENOUS ONCE
Status: COMPLETED | OUTPATIENT
Start: 2024-11-21 | End: 2024-11-21

## 2024-11-21 RX ORDER — MEPERIDINE HYDROCHLORIDE 25 MG/ML
25 INJECTION INTRAMUSCULAR; INTRAVENOUS; SUBCUTANEOUS
Status: CANCELLED
Start: 2024-11-22

## 2024-11-21 RX ORDER — METHYLPREDNISOLONE SODIUM SUCCINATE 40 MG/ML
40 INJECTION INTRAMUSCULAR; INTRAVENOUS
Status: CANCELLED
Start: 2024-11-22

## 2024-11-21 RX ORDER — DIPHENHYDRAMINE HYDROCHLORIDE 50 MG/ML
25 INJECTION INTRAMUSCULAR; INTRAVENOUS
Status: CANCELLED
Start: 2024-11-22

## 2024-11-21 RX ORDER — HEPARIN SODIUM (PORCINE) LOCK FLUSH IV SOLN 100 UNIT/ML 100 UNIT/ML
5 SOLUTION INTRAVENOUS ONCE
Status: COMPLETED | OUTPATIENT
Start: 2024-11-21 | End: 2024-11-21

## 2024-11-21 RX ORDER — DIPHENHYDRAMINE HCL 25 MG
50 CAPSULE ORAL ONCE
Status: COMPLETED | OUTPATIENT
Start: 2024-11-21 | End: 2024-11-21

## 2024-11-21 RX ORDER — DOXORUBICIN HYDROCHLORIDE 2 MG/ML
25 INJECTION, SOLUTION INTRAVENOUS ONCE
Status: COMPLETED | OUTPATIENT
Start: 2024-11-21 | End: 2024-11-21

## 2024-11-21 RX ORDER — ALBUTEROL SULFATE 0.83 MG/ML
2.5 SOLUTION RESPIRATORY (INHALATION)
Status: CANCELLED | OUTPATIENT
Start: 2024-11-22

## 2024-11-21 RX ORDER — ACETAMINOPHEN 325 MG/1
650 TABLET ORAL ONCE
Status: CANCELLED
Start: 2024-11-22

## 2024-11-21 RX ORDER — HEPARIN SODIUM (PORCINE) LOCK FLUSH IV SOLN 100 UNIT/ML 100 UNIT/ML
5 SOLUTION INTRAVENOUS
Status: CANCELLED | OUTPATIENT
Start: 2024-11-22

## 2024-11-21 RX ORDER — DIPHENHYDRAMINE HYDROCHLORIDE 50 MG/ML
50 INJECTION INTRAMUSCULAR; INTRAVENOUS
Status: CANCELLED
Start: 2024-11-22

## 2024-11-21 RX ORDER — DIPHENHYDRAMINE HCL 25 MG
50 CAPSULE ORAL ONCE
Status: CANCELLED
Start: 2024-11-22

## 2024-11-21 RX ADMIN — VINCRISTINE SULFATE 1 MG: 1 INJECTION, SOLUTION INTRAVENOUS at 13:01

## 2024-11-21 RX ADMIN — ACETAMINOPHEN 650 MG: 325 TABLET ORAL at 12:50

## 2024-11-21 RX ADMIN — HEPARIN SODIUM (PORCINE) LOCK FLUSH IV SOLN 100 UNIT/ML 5 ML: 100 SOLUTION at 15:16

## 2024-11-21 RX ADMIN — SODIUM CHLORIDE 250 ML: 9 INJECTION, SOLUTION INTRAVENOUS at 12:11

## 2024-11-21 RX ADMIN — RITUXIMAB-ABBS 800 MG: 10 INJECTION, SOLUTION INTRAVENOUS at 13:47

## 2024-11-21 RX ADMIN — DIPHENHYDRAMINE HYDROCHLORIDE 50 MG: 25 CAPSULE ORAL at 12:50

## 2024-11-21 RX ADMIN — FOSAPREPITANT 150 MG: 150 INJECTION, POWDER, LYOPHILIZED, FOR SOLUTION INTRAVENOUS at 12:24

## 2024-11-21 RX ADMIN — PEGFILGRASTIM 6 MG: KIT SUBCUTANEOUS at 13:51

## 2024-11-21 RX ADMIN — PALONOSETRON HYDROCHLORIDE 0.25 MG: 0.25 INJECTION INTRAVENOUS at 12:12

## 2024-11-21 RX ADMIN — HEPARIN SODIUM (PORCINE) LOCK FLUSH IV SOLN 100 UNIT/ML 5 ML: 100 SOLUTION at 11:24

## 2024-11-21 RX ADMIN — CYCLOPHOSPHAMIDE 890 MG: 1 INJECTION, POWDER, FOR SOLUTION INTRAVENOUS; ORAL at 13:13

## 2024-11-21 RX ADMIN — DOXORUBICIN HYDROCHLORIDE 60 MG: 2 INJECTION, SOLUTION INTRAVENOUS at 12:52

## 2024-11-21 ASSESSMENT — PAIN SCALES - GENERAL: PAINLEVEL_OUTOF10: NO PAIN (0)

## 2024-11-21 NOTE — NURSING NOTE
"Oncology Rooming Note    November 21, 2024 11:36 AM   Cara Cool is a 84 year old female who presents for:    Chief Complaint   Patient presents with    Port Draw     Labs collected from port by RN. Vitals taken. Checked in for appointment(s).     Oncology Clinic Visit     Non hodgkin's lymphoma     Initial Vitals: /72   Pulse 71   Temp 97.8  F (36.6  C)   Resp 16   Wt 106.6 kg (235 lb)   SpO2 98%   BMI 38.68 kg/m   Estimated body mass index is 38.68 kg/m  as calculated from the following:    Height as of 8/28/24: 1.66 m (5' 5.35\").    Weight as of this encounter: 106.6 kg (235 lb). Body surface area is 2.22 meters squared.  No Pain (0) Comment: Data Unavailable   No LMP recorded. Patient has had a hysterectomy.  Allergies reviewed: Yes  Medications reviewed: Yes    Medications: MEDICATION REFILLS NEEDED TODAY. Provider was notified.  Pharmacy name entered into [employer]:    [facility] - [place], [place] - [address] [place] [address] DR[facility] STORE #[id number] - [place], [place] - [address] [place] [address] AVE AT [address]    Frailty Screening:   Is the patient here for a new oncology consult visit in cancer care? 2. No      Clinical concerns:  Xarelto refills needed if possible       Brittanie Wolf              "

## 2024-11-21 NOTE — PROGRESS NOTES
Infusion Nursing Note:  Cara Cool presents today for Cycle 5, day 1 rituxan, adriamycin, vincristine, cytoxan .    Patient seen by provider today: Yes: Cindi Potter,TITI   present during visit today: Not Applicable.    Note: Patient confirms she has prednisone at home and already took her dose this morning.      Intravenous Access:  Implanted Port.    Treatment Conditions:  Lab Results   Component Value Date    HGB 11.6 (L) 11/21/2024    WBC 8.4 11/21/2024    ANEU 8.0 11/21/2024    ANEUTAUTO 7.9 10/31/2024     11/21/2024        Lab Results   Component Value Date     11/21/2024    POTASSIUM 4.6 11/21/2024    MAG 1.7 07/17/2024    CR 0.84 11/21/2024    TREVOR 9.5 11/21/2024    BILITOTAL 0.4 11/21/2024    ALBUMIN 4.0 11/21/2024    ALT 10 11/21/2024    AST 25 11/21/2024     Results reviewed, labs met treatment parameters. Ok to proceed  ECHO/MUGA completed 7/17/24  EF 70%.      Post Infusion Assessment:  Patient tolerated infusion without incident.  Blood return noted pre and post infusion.  Blood return noted during adriamycin administration every 2-3 ml and pre, mid, and post vincristine infusion by gravity  Site patent and intact, free from redness, edema or discomfort.  No evidence of extravasations.  Access discontinued per protocol.     Neulasta Onpro On-Body injector applied to right upper abdomen at 1355.  Writer discussed Neulasta injection would start tomorrow at 1655, approximately 27 hours after application applied today.    Written and Verbal instruction reviewed with patient.  Pt instructed when the dose delivery starts, it will take about 45 minutes to complete.  Pt aware Neulasta Onpro On-Body should have green flashing light and to call triage or on-call MD if injector flashes red or appears to be leaking.   Pt aware to keep Onpro On-Body Neulasta 4 inches away from electrical equipment and to avoid showering 4 hours prior to injection.     Discharge Plan:   Patient declined  prescription refills.  Discharge instructions reviewed with: Patient and Family.  Patient and/or family verbalized understanding of discharge instructions and all questions answered.  AVS to patient via [email or web address].  Patient will return 12/12/24 for next appointment.   Patient discharged in stable condition accompanied by: self and daughter.  Departure Mode: Ambulatory.      Tigist Gaxiola RN

## 2024-11-21 NOTE — LETTER
11/21/2024      Cara Cool  [address] [place] [id number]      Dear Colleague,    Thank you for referring your patient, Cara Cool, to the [facility]. Please see a copy of my visit note below.    [facility]  Return Visit  Nov 21, 2024      Meenu Marroquin is an 84 year old, presenting for the following health issues:  DLBCL         Oncology History Overview Note   This is an 85 y/o female with a PMH of pAF and saddle PE 2021 on Xarelto, HLD, HTN, TIA, OA, autoimmune hepatitis (on azathioprine), IgM/IgG kappa MGUS (dx 11/2017, follows with Dr. 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     Patient presented to the ED 7/17/2024 with severe and progressive mid-back pain x1 week without recent falls or injuries. Initial imaging revealed lytic lesion of left T12 transverse process and T11 fracture. She was admitted for pain control and further work-up. Additional imaging (CT aortic survey, MRI thoracolumbar, bone scan) had evidence of widespread bony metastasis on thoracolumbar spine, proximal sacrum, bilateral ischium, ribs, and possibly L femur w/o clear evidence of primary source. She completed an outpatient bone bx 7/30 via neurosurgery.    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 The Ki-67 proliferation index is estimated to be 50-60% .  Echo shows preserved EF of 60 to 65%.  HIV hepatitis B negative.  FISH is pending for Bcl-2 and MYC rearrangement.  PET scan showsNumerous scattered hypermetabolic lesions throughout axial skeleton largest on most FDG avid is T11.  There is current extension into spinal canal and 2 neural foramina for T10/T11 and T11/T12.  There  is a hypermetabolic lesion on the liver that might be related to lymphoma.  Numerous hypermetabolic lesions in appendicular skeleton including the left humeral head and right mid femur.  Hypermetabolic activity in the short segment narrowing of sigmoid colon.  This scan was discussed with radiologist.  Due to the fact that PET scan is with CT without contrast, reliable assessment of spinal nerve roots was not done and MRI was recommended.  Patient did not have any weakness or neuropathy or sciatica concerning for nerve root involvement.  Patient is quite symptomatic from her disease in terms of bone pains and require treatment in time sensitive manner.  Considering patient's age I do not think patient can tolerate concurrent M R-CHOP.  Majority of patient's disease burden is symptomatic and external compression to nerve root and dural involvement can also be treated by R-CHOP.  So I elected to proceed with R mini CHOP therapy.      She received 2 cycles, tolerated it well. PET scan after 2 cycles shows near CR with residual mild activity on T11- SUV 5.8     Diffuse large B-cell lymphoma of extranodal site   8/16/2024 Initial Diagnosis    Diffuse large B-cell lymphoma of extranodal site (H)     8/28/2024 -  Chemotherapy    OP ONC Non-Hodgkin's Lymphoma - R-CHOP  Plan Provider: Darya Yu MD  Treatment goal: Curative  Line of treatment: First Line       Interval history-  Cara is here today for follow up prior to treatment.   She is tolerating treatment very well. Energy has been stable. She has good appetite/ good oral hydration. Denies nausea or bowel complaints. She has noticed darkening pigmentation under her nails increased, but no peeling or cracking noted. No further skin concerns. No bleeding/ bruising. No pain. No numbness or tingling.   ROS otherwise neg.         Objective   Physical Exam:  /72   Pulse 71   Temp 97.8  F (36.6  C)   Resp 16   Wt 106.6 kg (235 lb)   SpO2 98%   BMI 38.68 kg/m     General: No acute distress.  HEENT: EOMI, PERRL. Sclerae are anicteric. Oral mucosa is pink and moist with no lesions or thrush.   Lymph: Neck is supple with no lymphadenopathy in the cervical or supraclavicular areas.   Heart: Regular rate and rhythm.   Lungs: Clear to auscultation bilaterally.   Abdomen: Bowel sounds present, soft, nontender with no palpable hepatosplenomegaly or masses.   Extremities: No lower extremity edema noted bilaterally.   Neuro: Alert and oriented x3, CN grossly intact, steady gait  Skin: No rashes, petechiae, or bruising noted on exposed skin.    Wt Readings from Last 4 Encounters:   11/21/24 106.6 kg (235 lb)   10/31/24 107.1 kg (236 lb 1.6 oz)   10/14/24 107 kg (235 lb 14.4 oz)   10/11/24 107.5 kg (237 lb)         Labs:  I personally reviewed the following labs:    Most Recent 3 CBC's:  Recent Labs   Lab Test 11/21/24  1123 10/31/24  0913 10/11/24  1028   WBC 8.4 9.2 6.2   HGB 11.6* 11.8 11.4*   MCV 96 94 93    314 301     Most Recent 3 BMP's:  Recent Labs   Lab Test 11/21/24  1123 10/31/24  0913 10/11/24  1028    142 141   POTASSIUM 4.6 4.5 4.1   CHLORIDE 109* 109* 108*   CO2 24 22 24   BUN 18.5 22.4 21.3   CR 0.84 0.93 0.85   ANIONGAP 9 11 9   TREVOR 9.5 9.6 9.5   GLC 98 105* 91     Most Recent 2 LFT's:  Recent Labs   Lab Test 11/21/24  1123 10/31/24  0913   AST 25 22   ALT 10 9   ALKPHOS 160* 161*   BILITOTAL 0.4 0.4       Assessment and Plan:    1) Diffuse large B cell lymphoma:  - She is tolerating treatment very well. She has excellent response to chemotherapy so far evidenced on her interval PET scan 10/4/24. We will continue treatment for total 6 cycles.  - No major toxicities of chemotherapy. Continue to monitor.  - Okay to proceed with C5 today 11/21/24   - RTC prior to C6  - EOT PET and Dr Yu    2) Thyroid nodule-- incidental finding on PET-- 2.5 cm Left sided -- consider thyroid US after completion of chemotherapy    35 minutes spent on the date of the  encounter doing chart review, review of test results, interpretation of tests, patient visit, and documentation     The longitudinal plan of care for the diagnosis(es)/condition(s) as documented were addressed during this visit. Due to the added complexity in care, I will continue to support Cara in the subsequent management and with ongoing continuity of care.    Cindi Potter [facility]      Again, thank you for allowing me to participate in the care of your patient.        Sincerely,        Cindi Potter, VALENTINA CNP

## 2024-12-09 DIAGNOSIS — C83.398 DIFFUSE LARGE B-CELL LYMPHOMA OF EXTRANODAL SITE: Primary | ICD-10-CM

## 2024-12-09 PROCEDURE — 94640 AIRWAY INHALATION TREATMENT: CPT | Performed by: INTERNAL MEDICINE

## 2024-12-09 PROCEDURE — 94642 AEROSOL INHALATION TREATMENT: CPT | Performed by: INTERNAL MEDICINE

## 2024-12-09 RX ORDER — METHYLPREDNISOLONE SODIUM SUCCINATE 40 MG/ML
40 INJECTION INTRAMUSCULAR; INTRAVENOUS
Start: 2024-12-20

## 2024-12-09 RX ORDER — PENTAMIDINE ISETHIONATE 300 MG/300MG
300 INHALANT RESPIRATORY (INHALATION) ONCE
Status: COMPLETED | OUTPATIENT
Start: 2024-12-09 | End: 2024-12-09

## 2024-12-09 RX ORDER — ALBUTEROL SULFATE 0.83 MG/ML
2.5 SOLUTION RESPIRATORY (INHALATION) ONCE
Status: COMPLETED | OUTPATIENT
Start: 2024-12-09 | End: 2024-12-09

## 2024-12-09 RX ORDER — DIPHENHYDRAMINE HYDROCHLORIDE 50 MG/ML
50 INJECTION INTRAMUSCULAR; INTRAVENOUS
Start: 2024-12-20

## 2024-12-09 RX ORDER — ALBUTEROL SULFATE 90 UG/1
1-2 INHALANT RESPIRATORY (INHALATION)
Start: 2024-12-20

## 2024-12-09 RX ORDER — ALBUTEROL SULFATE 0.83 MG/ML
2.5 SOLUTION RESPIRATORY (INHALATION) ONCE
Start: 2024-12-20 | End: 2024-12-20

## 2024-12-09 RX ORDER — EPINEPHRINE 1 MG/ML
0.3 INJECTION, SOLUTION, CONCENTRATE INTRAVENOUS EVERY 5 MIN PRN
OUTPATIENT
Start: 2024-12-20

## 2024-12-09 RX ORDER — PENTAMIDINE ISETHIONATE 300 MG/300MG
300 INHALANT RESPIRATORY (INHALATION) ONCE
Start: 2024-12-20 | End: 2024-12-20

## 2024-12-09 RX ORDER — ALBUTEROL SULFATE 0.83 MG/ML
2.5 SOLUTION RESPIRATORY (INHALATION)
OUTPATIENT
Start: 2024-12-20

## 2024-12-09 RX ORDER — DIPHENHYDRAMINE HYDROCHLORIDE 50 MG/ML
25 INJECTION INTRAMUSCULAR; INTRAVENOUS
Start: 2024-12-20

## 2024-12-09 RX ORDER — MEPERIDINE HYDROCHLORIDE 25 MG/ML
25 INJECTION INTRAMUSCULAR; INTRAVENOUS; SUBCUTANEOUS
OUTPATIENT
Start: 2024-12-20

## 2024-12-09 RX ADMIN — PENTAMIDINE ISETHIONATE 300 MG: 300 INHALANT RESPIRATORY (INHALATION) at 11:11

## 2024-12-09 RX ADMIN — ALBUTEROL SULFATE 2.5 MG: 0.83 SOLUTION RESPIRATORY (INHALATION) at 11:11

## 2024-12-09 NOTE — PROGRESS NOTES
Patient was seen today for a Pentamidine nebulizer tx ordered by Dr. Yu.    Patient was first given 2.5 mg of albuterol nebulizer, after which Pentamidine 300 mg (Lot # [id number]) mixed with 6cc Sterile H20 was administered through a filtered nebulizer.    Pre-treatment: SpO2 = 96%   HR = 69   BBS = clear   Post-treatment: SpO2 = 94%  HR = 72  BBS = clear    No adverse side effects noted by the patient.    This service today was provided under the direct supervision of Dr. Min, who was available if needed.     Procedure was completed by Khushboo Odonnell.

## 2024-12-11 ENCOUNTER — LAB (OUTPATIENT)
Dept: LAB | Facility: CLINIC | Age: 84
End: 2024-12-11
Attending: STUDENT IN AN ORGANIZED HEALTH CARE EDUCATION/TRAINING PROGRAM
Payer: COMMERCIAL

## 2024-12-11 LAB
ALBUMIN SERPL BCG-MCNC: 3.8 G/DL (ref 3.5–5.2)
ALP SERPL-CCNC: 146 U/L (ref 40–150)
ALT SERPL W P-5'-P-CCNC: 7 U/L (ref 0–50)
ANION GAP SERPL CALCULATED.3IONS-SCNC: 10 MMOL/L (ref 7–15)
AST SERPL W P-5'-P-CCNC: 23 U/L (ref 0–45)
BASOPHILS # BLD AUTO: 0 10E3/UL (ref 0–0.2)
BASOPHILS NFR BLD AUTO: 1 %
BILIRUB SERPL-MCNC: 0.5 MG/DL
BUN SERPL-MCNC: 17.4 MG/DL (ref 8–23)
CALCIUM SERPL-MCNC: 9.5 MG/DL (ref 8.8–10.4)
CHLORIDE SERPL-SCNC: 110 MMOL/L (ref 98–107)
CREAT SERPL-MCNC: 0.84 MG/DL (ref 0.51–0.95)
EGFRCR SERPLBLD CKD-EPI 2021: 69 ML/MIN/1.73M2
EOSINOPHIL # BLD AUTO: 0 10E3/UL (ref 0–0.7)
EOSINOPHIL NFR BLD AUTO: 1 %
ERYTHROCYTE [DISTWIDTH] IN BLOOD BY AUTOMATED COUNT: 18.4 % (ref 10–15)
GLUCOSE SERPL-MCNC: 93 MG/DL (ref 70–99)
HCO3 SERPL-SCNC: 23 MMOL/L (ref 22–29)
HCT VFR BLD AUTO: 36 % (ref 35–47)
HGB BLD-MCNC: 11.5 G/DL (ref 11.7–15.7)
IMM GRANULOCYTES # BLD: 0.3 10E3/UL
IMM GRANULOCYTES NFR BLD: 5 %
LYMPHOCYTES # BLD AUTO: 0.7 10E3/UL (ref 0.8–5.3)
LYMPHOCYTES NFR BLD AUTO: 13 %
MCH RBC QN AUTO: 31.3 PG (ref 26.5–33)
MCHC RBC AUTO-ENTMCNC: 31.9 G/DL (ref 31.5–36.5)
MCV RBC AUTO: 98 FL (ref 78–100)
MONOCYTES # BLD AUTO: 0.7 10E3/UL (ref 0–1.3)
MONOCYTES NFR BLD AUTO: 13 %
NEUTROPHILS # BLD AUTO: 3.6 10E3/UL (ref 1.6–8.3)
NEUTROPHILS NFR BLD AUTO: 68 %
NRBC # BLD AUTO: 0 10E3/UL
NRBC BLD AUTO-RTO: 0 /100
PLATELET # BLD AUTO: 291 10E3/UL (ref 150–450)
POTASSIUM SERPL-SCNC: 4.7 MMOL/L (ref 3.4–5.3)
PROT SERPL-MCNC: 6.9 G/DL (ref 6.4–8.3)
RBC # BLD AUTO: 3.68 10E6/UL (ref 3.8–5.2)
SODIUM SERPL-SCNC: 143 MMOL/L (ref 135–145)
WBC # BLD AUTO: 5.3 10E3/UL (ref 4–11)

## 2024-12-11 PROCEDURE — 85025 COMPLETE CBC W/AUTO DIFF WBC: CPT | Performed by: STUDENT IN AN ORGANIZED HEALTH CARE EDUCATION/TRAINING PROGRAM

## 2024-12-11 PROCEDURE — 80053 COMPREHEN METABOLIC PANEL: CPT | Performed by: STUDENT IN AN ORGANIZED HEALTH CARE EDUCATION/TRAINING PROGRAM

## 2024-12-11 PROCEDURE — 250N000011 HC RX IP 250 OP 636: Performed by: STUDENT IN AN ORGANIZED HEALTH CARE EDUCATION/TRAINING PROGRAM

## 2024-12-11 PROCEDURE — 36591 DRAW BLOOD OFF VENOUS DEVICE: CPT | Performed by: STUDENT IN AN ORGANIZED HEALTH CARE EDUCATION/TRAINING PROGRAM

## 2024-12-11 RX ORDER — HEPARIN SODIUM (PORCINE) LOCK FLUSH IV SOLN 100 UNIT/ML 100 UNIT/ML
5 SOLUTION INTRAVENOUS
Status: COMPLETED | OUTPATIENT
Start: 2024-12-11 | End: 2024-12-11

## 2024-12-11 RX ADMIN — HEPARIN 5 ML: 100 SYRINGE at 13:36

## 2024-12-11 NOTE — PROGRESS NOTES
[facility]  Date of visit: 12/12/2024      Reason for Visit: Follow-up for DLBCL germinal cell subtype with lytic lesions      Oncology HPI:  Cara is an 84 year old female with a PMH of pAF and saddle PE 2021 on Xarelto, HLD, HTN, TIA, OA, autoimmune hepatitis (on azathioprine), IgM/IgG kappa MGUS (dx 11/2017, follows with Dr. 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     Patient presented to the ED 7/17/2024 with severe and progressive mid-back pain x1 week without recent falls or injuries. Initial imaging revealed lytic lesion of left T12 transverse process and T11 fracture. She was admitted for pain control and further work-up. Additional imaging (CT aortic survey, MRI thoracolumbar, bone scan) had evidence of widespread bony metastasis on thoracolumbar spine, proximal sacrum, bilateral ischium, ribs, and possibly L femur w/o clear evidence of primary source. She completed an outpatient bone bx 7/30 via neurosurgery.    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 The Ki-67 proliferation index is estimated to be 50-60% .  Echo shows preserved EF of 60 to 65%.  HIV hepatitis B negative.  FISH is pending for Bcl-2 and MYC rearrangement. 8/15/24  PET scan showed numerous scattered hypermetabolic lesions throughout axial skeleton largest on most FDG avid is T11.  There is current extension into spinal canal and 2 neural foramina for T10/T11 and T11/T12.  There is a hypermetabolic lesion on the liver that might be related to lymphoma.  Numerous hypermetabolic lesions in appendicular skeleton including the left humeral head and right mid femur.  Hypermetabolic activity in the short  segment narrowing of sigmoid colon.  This scan was discussed with radiologist.  Due to the fact that PET scan is with CT without contrast, reliable assessment of spinal nerve roots was not done and MRI was recommended.  Patient did not have any weakness or neuropathy or sciatica concerning for nerve root involvement.  She was quite symptomatic from her disease in terms of bone pains and require treatment in time sensitive manner.  Considering patient's age we did not think patient could tolerate concurrent M-R-CHOP.  Majority of patient's disease burden is symptomatic and external compression to nerve root and dural involvement can also be treated by R-CHOP.  Decided to proceed with mini-R-CHOP (C1D1=8/28/24).  10/4/24 PET s/p C2 demonstrated a significant response to therapy.      Interval history:   Cara presents to clinic for toxicity check prior to C6D1 mini-R-CHOP.    She has been feeling well and has remained active.  She did the cooking for her Thanksgiving.  Her appetite has been good and is eating and drinking well.   She has urinary urgency which has been ongoing.  Denies fevers/chills, night sweats, N/V/D/C, bleeding issues, neuropathy, urinary changes, mouth sores, new pains, new lumps/bumps.    Current Outpatient Medications   Medication Sig Dispense Refill    acyclovir (ZOVIRAX) 400 MG tablet Take 1 tablet (400 mg) by mouth every 12 hours. 60 tablet 11    alendronate (FOSAMAX) 70 MG tablet Take 70 mg by mouth every 7 days On Sundays      atorvastatin (LIPITOR) 20 MG tablet Take 20 mg by mouth daily      azaTHIOprine (IMURAN) 50 MG tablet Take 75 mg by mouth daily.      digoxin (LANOXIN) 125 MCG tablet Take 125 mcg by mouth daily.      hydrochlorothiazide (HYDRODIURIL) 50 MG tablet Take 50 mg by mouth daily      lidocaine-prilocaine (EMLA) 2.5-2.5 % external cream Apply topically daily as needed for moderate pain. 30 g 1    metoprolol tartrate (LOPRESSOR) 25 MG tablet Take 0.5 tablets (12.5 mg) by  mouth 2 times daily 60 tablet 0    nystatin (MYCOSTATIN) 761835 UNIT/ML suspension Take 5 mLs (500,000 Units) by mouth 4 times daily. (Patient not taking: Reported on 11/21/2024) 200 mL 1    ondansetron (ZOFRAN) 8 MG tablet Take 1 tablet (8 mg) by mouth every 8 hours as needed for nausea (vomiting). (Patient not taking: Reported on 9/18/2024) 30 tablet 2    oxyCODONE (ROXICODONE) 5 MG tablet Take 1 tablet (5 mg) by mouth every 4 hours as needed for severe pain 9 tablet 0    polyethylene glycol (MIRALAX) 17 GM/Dose powder Take 1 Capful by mouth daily as needed for constipation.      predniSONE (DELTASONE) 50 MG tablet Take 1 tablet (50 mg) by mouth daily. Take on Days 1 through 5. Take first dose in AM prior to chemotherapy. 10 tablet 5    prochlorperazine (COMPAZINE) 10 MG tablet Take 1 tablet (10 mg) by mouth every 6 hours as needed for nausea or vomiting. (Patient not taking: Reported on 11/21/2024) 30 tablet 2    spironolactone (ALDACTONE) 50 MG tablet Take 50 mg by mouth daily      Vitamin D3 (VITAMIN D-1000 MAX ST) 25 mcg (1000 units) tablet Take 1,000 Units by mouth daily      XARELTO ANTICOAGULANT 20 MG TABS tablet Take 20 mg by mouth daily (with dinner).         No Known Allergies      Physical Exam:   11/21/2024  11:11 AM   Vitals    Systolic 131    Diastolic 72    Pulse 71    Respiration 16    Temp 97.8  F (36.6  C)    O2 98 %    Pain Score 0 (None)    Weight 235 lb    Height    BSA    BMI    General: patient appears well in no acute distress, alert and oriented, speech clear and fluid  Skin: no visualized rash or lesions on visualized skin  Resp: Appears to be breathing comfortably without accessory muscle usage, speaking in full sentences, no audible wheezes or cough.  Psych: Coherent speech, normal rate and volume, able to articulate logical thoughts, able to abstract reason, no tangential thoughts, no hallucinations or delusions  Patient's affect is appropriate.      Labs:   Most Recent 3  CBC's:  Recent Labs   Lab Test 12/11/24  1341 11/21/24  1123 10/31/24  0913 10/11/24  1028   WBC 5.3 8.4 9.2 6.2   HGB 11.5* 11.6* 11.8 11.4*   MCV 98 96 94 93    293 314 301   ANEUTAUTO 3.6  --  7.9 4.6     Most Recent 3 BMP's:  Recent Labs   Lab Test 12/11/24  1341 11/21/24  1123 10/31/24  0913    142 142   POTASSIUM 4.7 4.6 4.5   CHLORIDE 110* 109* 109*   CO2 23 24 22   BUN 17.4 18.5 22.4   CR 0.84 0.84 0.93   ANIONGAP 10 9 11   TREVOR 9.5 9.5 9.6   GLC 93 98 105*   PROTTOTAL 6.9 6.9 7.1   ALBUMIN 3.8 4.0 3.9    Most Recent 3 LFT's:  Recent Labs   Lab Test 12/11/24  1341 11/21/24  1123 10/31/24  0913   AST 23 25 22   ALT 7 10 9   ALKPHOS 146 160* 161*   BILITOTAL 0.5 0.4 0.4    Most Recent 2 TSH and T4:No lab results found.  I reviewed the above labs today.    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  - Mini-R-CHOP (C1D1=8/28/24)- plan for total of 6 cycles  - She has excellent response to chemotherapy so far. We will continue treatment for total 6 cycles.   - Proceed with C6D1 Mini-R-CHOP today (12/12)- she is tolerating treatment well with no side-effects   - Repeat PET 1/6 and follow-up with Dr. Yu 1/13    Ppx:  - Acyclovir 400 mg BID  - Neb Pentamidine monthly- last administered 12/9    Urinary Urgency  Increased urinary urgency but no pain, foul smelling odor or any other changes  - Will check UA today (12/12)    Thyroid nodule:  Incidental finding on PET-- 2.5 cm Left sided -- consider thyroid US after completion of chemotherapy       31  minutes spent on the date of the encounter doing chart review, review of test results, interpretation of tests, patient visit, and documentation     VALENTINA Montenegro CNP  [facility] [place] [id number] [place] [address] [phone]

## 2024-12-11 NOTE — NURSING NOTE
"Chief Complaint   Patient presents with    Lab Only     Labs drawn from port by rn.      Port accessed with 20 gauge 3/4\" Power needle and labs drawn by rn.  Port flushed with NS and heparin then de-accessed.  Pt tolerated well.      Helen Alas RN      "

## 2024-12-12 ENCOUNTER — ONCOLOGY VISIT (OUTPATIENT)
Dept: ONCOLOGY | Facility: CLINIC | Age: 84
End: 2024-12-12
Attending: STUDENT IN AN ORGANIZED HEALTH CARE EDUCATION/TRAINING PROGRAM
Payer: COMMERCIAL

## 2024-12-12 VITALS
OXYGEN SATURATION: 95 % | RESPIRATION RATE: 16 BRPM | BODY MASS INDEX: 38.67 KG/M2 | TEMPERATURE: 97.8 F | WEIGHT: 234.9 LBS | SYSTOLIC BLOOD PRESSURE: 115 MMHG | DIASTOLIC BLOOD PRESSURE: 75 MMHG | HEART RATE: 77 BPM

## 2024-12-12 VITALS — BODY MASS INDEX: 38.45 KG/M2 | WEIGHT: 233.6 LBS

## 2024-12-12 DIAGNOSIS — Z51.11 ENCOUNTER FOR ANTINEOPLASTIC CHEMOTHERAPY: ICD-10-CM

## 2024-12-12 DIAGNOSIS — C83.398 DIFFUSE LARGE B-CELL LYMPHOMA OF EXTRANODAL SITE: Primary | ICD-10-CM

## 2024-12-12 DIAGNOSIS — R39.15 URINARY URGENCY: ICD-10-CM

## 2024-12-12 DIAGNOSIS — E04.1 THYROID NODULE: ICD-10-CM

## 2024-12-12 LAB
ALBUMIN UR-MCNC: NEGATIVE MG/DL
APPEARANCE UR: CLEAR
BILIRUB UR QL STRIP: NEGATIVE
COLOR UR AUTO: ABNORMAL
GLUCOSE UR STRIP-MCNC: NEGATIVE MG/DL
HGB UR QL STRIP: NEGATIVE
HYALINE CASTS: 1 /LPF
KETONES UR STRIP-MCNC: ABNORMAL MG/DL
LEUKOCYTE ESTERASE UR QL STRIP: NEGATIVE
MUCOUS THREADS #/AREA URNS LPF: PRESENT /LPF
NITRATE UR QL: NEGATIVE
PH UR STRIP: 5.5 [PH] (ref 5–7)
RBC URINE: 1 /HPF
SP GR UR STRIP: 1.02 (ref 1–1.03)
SQUAMOUS EPITHELIAL: 7 /HPF
UROBILINOGEN UR STRIP-MCNC: 2 MG/DL
WBC URINE: 1 /HPF

## 2024-12-12 PROCEDURE — 81003 URINALYSIS AUTO W/O SCOPE: CPT

## 2024-12-12 PROCEDURE — 250N000013 HC RX MED GY IP 250 OP 250 PS 637

## 2024-12-12 PROCEDURE — 258N000003 HC RX IP 258 OP 636

## 2024-12-12 PROCEDURE — G0463 HOSPITAL OUTPT CLINIC VISIT: HCPCS

## 2024-12-12 PROCEDURE — 250N000011 HC RX IP 250 OP 636: Mod: JW

## 2024-12-12 PROCEDURE — 96372 THER/PROPH/DIAG INJ SC/IM: CPT

## 2024-12-12 RX ORDER — MEPERIDINE HYDROCHLORIDE 25 MG/ML
25 INJECTION INTRAMUSCULAR; INTRAVENOUS; SUBCUTANEOUS
Status: CANCELLED
Start: 2024-12-13

## 2024-12-12 RX ORDER — METHYLPREDNISOLONE SODIUM SUCCINATE 40 MG/ML
40 INJECTION INTRAMUSCULAR; INTRAVENOUS
Status: CANCELLED
Start: 2024-12-13

## 2024-12-12 RX ORDER — MEPERIDINE HYDROCHLORIDE 25 MG/ML
25 INJECTION INTRAMUSCULAR; INTRAVENOUS; SUBCUTANEOUS
Status: CANCELLED | OUTPATIENT
Start: 2024-12-13

## 2024-12-12 RX ORDER — HEPARIN SODIUM,PORCINE 10 UNIT/ML
5-20 VIAL (ML) INTRAVENOUS DAILY PRN
Status: CANCELLED | OUTPATIENT
Start: 2024-12-13

## 2024-12-12 RX ORDER — ACETAMINOPHEN 325 MG/1
650 TABLET ORAL ONCE
Status: CANCELLED
Start: 2024-12-13

## 2024-12-12 RX ORDER — EPINEPHRINE 1 MG/ML
0.3 INJECTION, SOLUTION INTRAMUSCULAR; SUBCUTANEOUS EVERY 5 MIN PRN
Status: CANCELLED | OUTPATIENT
Start: 2024-12-13

## 2024-12-12 RX ORDER — ALBUTEROL SULFATE 0.83 MG/ML
2.5 SOLUTION RESPIRATORY (INHALATION)
Status: CANCELLED | OUTPATIENT
Start: 2024-12-13

## 2024-12-12 RX ORDER — ACETAMINOPHEN 325 MG/1
650 TABLET ORAL ONCE
Status: COMPLETED | OUTPATIENT
Start: 2024-12-12 | End: 2024-12-12

## 2024-12-12 RX ORDER — LORAZEPAM 2 MG/ML
0.5 INJECTION INTRAMUSCULAR EVERY 4 HOURS PRN
Status: CANCELLED | OUTPATIENT
Start: 2024-12-13

## 2024-12-12 RX ORDER — DIPHENHYDRAMINE HYDROCHLORIDE 50 MG/ML
25 INJECTION INTRAMUSCULAR; INTRAVENOUS
Status: CANCELLED
Start: 2024-12-13

## 2024-12-12 RX ORDER — HEPARIN SODIUM (PORCINE) LOCK FLUSH IV SOLN 100 UNIT/ML 100 UNIT/ML
5 SOLUTION INTRAVENOUS
Status: DISCONTINUED | OUTPATIENT
Start: 2024-12-12 | End: 2024-12-12 | Stop reason: HOSPADM

## 2024-12-12 RX ORDER — METHYLPREDNISOLONE SODIUM SUCCINATE 125 MG/2ML
125 INJECTION INTRAMUSCULAR; INTRAVENOUS
Status: CANCELLED
Start: 2024-12-13

## 2024-12-12 RX ORDER — DOXORUBICIN HYDROCHLORIDE 2 MG/ML
25 INJECTION, SOLUTION INTRAVENOUS ONCE
Status: COMPLETED | OUTPATIENT
Start: 2024-12-12 | End: 2024-12-12

## 2024-12-12 RX ORDER — ALBUTEROL SULFATE 90 UG/1
1-2 INHALANT RESPIRATORY (INHALATION)
Status: CANCELLED
Start: 2024-12-13

## 2024-12-12 RX ORDER — DIPHENHYDRAMINE HCL 25 MG
50 CAPSULE ORAL ONCE
Status: CANCELLED
Start: 2024-12-13

## 2024-12-12 RX ORDER — PALONOSETRON 0.05 MG/ML
0.25 INJECTION, SOLUTION INTRAVENOUS ONCE
Status: CANCELLED
Start: 2024-12-13

## 2024-12-12 RX ORDER — DIPHENHYDRAMINE HCL 25 MG
50 CAPSULE ORAL ONCE
Status: COMPLETED | OUTPATIENT
Start: 2024-12-12 | End: 2024-12-12

## 2024-12-12 RX ORDER — PALONOSETRON 0.05 MG/ML
0.25 INJECTION, SOLUTION INTRAVENOUS ONCE
Status: COMPLETED | OUTPATIENT
Start: 2024-12-12 | End: 2024-12-12

## 2024-12-12 RX ORDER — DOXORUBICIN HYDROCHLORIDE 2 MG/ML
25 INJECTION, SOLUTION INTRAVENOUS ONCE
Status: CANCELLED | OUTPATIENT
Start: 2024-12-13

## 2024-12-12 RX ORDER — HEPARIN SODIUM (PORCINE) LOCK FLUSH IV SOLN 100 UNIT/ML 100 UNIT/ML
5 SOLUTION INTRAVENOUS
Status: CANCELLED | OUTPATIENT
Start: 2024-12-13

## 2024-12-12 RX ORDER — DIPHENHYDRAMINE HYDROCHLORIDE 50 MG/ML
50 INJECTION INTRAMUSCULAR; INTRAVENOUS
Status: CANCELLED
Start: 2024-12-13

## 2024-12-12 RX ADMIN — DIPHENHYDRAMINE HYDROCHLORIDE 50 MG: 25 CAPSULE ORAL at 12:15

## 2024-12-12 RX ADMIN — ACETAMINOPHEN 650 MG: 325 TABLET ORAL at 12:15

## 2024-12-12 RX ADMIN — FOSAPREPITANT 150 MG: 150 INJECTION, POWDER, LYOPHILIZED, FOR SOLUTION INTRAVENOUS at 11:39

## 2024-12-12 RX ADMIN — DOXORUBICIN HYDROCHLORIDE 60 MG: 2 INJECTION, SOLUTION INTRAVENOUS at 12:06

## 2024-12-12 RX ADMIN — SODIUM CHLORIDE 800 MG: 9 INJECTION, SOLUTION INTRAVENOUS at 13:13

## 2024-12-12 RX ADMIN — HEPARIN SODIUM (PORCINE) LOCK FLUSH IV SOLN 100 UNIT/ML 5 ML: 100 SOLUTION at 14:44

## 2024-12-12 RX ADMIN — VINCRISTINE SULFATE 1 MG: 1 INJECTION, SOLUTION INTRAVENOUS at 12:13

## 2024-12-12 RX ADMIN — CYCLOPHOSPHAMIDE 890 MG: 1 INJECTION, POWDER, FOR SOLUTION INTRAVENOUS; ORAL at 12:35

## 2024-12-12 RX ADMIN — SODIUM CHLORIDE 100 ML: 9 INJECTION, SOLUTION INTRAVENOUS at 11:39

## 2024-12-12 RX ADMIN — PALONOSETRON HYDROCHLORIDE 0.25 MG: 0.25 INJECTION INTRAVENOUS at 12:01

## 2024-12-12 ASSESSMENT — PAIN SCALES - GENERAL: PAINLEVEL_OUTOF10: NO PAIN (0)

## 2024-12-12 NOTE — LETTER
12/12/2024      Cara Cool  [address] [place] [id number]      Dear Colleague,    Thank you for referring your patient, Cara Cool, to the [facility]. Please see a copy of my visit note below.    [facility]  Date of visit: 12/12/2024      Reason for Visit: Follow-up for DLBCL germinal cell subtype with lytic lesions      Oncology HPI:  Cara is an 84 year old female with a PMH of pAF and saddle PE 2021 on Xarelto, HLD, HTN, TIA, OA, autoimmune hepatitis (on azathioprine), IgM/IgG kappa MGUS (dx 11/2017, follows with Dr. 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     Patient presented to the ED 7/17/2024 with severe and progressive mid-back pain x1 week without recent falls or injuries. Initial imaging revealed lytic lesion of left T12 transverse process and T11 fracture. She was admitted for pain control and further work-up. Additional imaging (CT aortic survey, MRI thoracolumbar, bone scan) had evidence of widespread bony metastasis on thoracolumbar spine, proximal sacrum, bilateral ischium, ribs, and possibly L femur w/o clear evidence of primary source. She completed an outpatient bone bx 7/30 via neurosurgery.    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 The Ki-67 proliferation index is estimated to be 50-60% .  Echo shows preserved EF of 60 to 65%.  HIV hepatitis B negative.  FISH is pending for Bcl-2 and MYC rearrangement. 8/15/24  PET scan showed numerous scattered hypermetabolic lesions throughout axial skeleton largest on most FDG avid is T11.  There is current extension into spinal canal and 2 neural foramina for T10/T11 and  T11/T12.  There is a hypermetabolic lesion on the liver that might be related to lymphoma.  Numerous hypermetabolic lesions in appendicular skeleton including the left humeral head and right mid femur.  Hypermetabolic activity in the short segment narrowing of sigmoid colon.  This scan was discussed with radiologist.  Due to the fact that PET scan is with CT without contrast, reliable assessment of spinal nerve roots was not done and MRI was recommended.  Patient did not have any weakness or neuropathy or sciatica concerning for nerve root involvement.  She was quite symptomatic from her disease in terms of bone pains and require treatment in time sensitive manner.  Considering patient's age we did not think patient could tolerate concurrent M-R-CHOP.  Majority of patient's disease burden is symptomatic and external compression to nerve root and dural involvement can also be treated by R-CHOP.  Decided to proceed with mini-R-CHOP (C1D1=8/28/24).  10/4/24 PET s/p C2 demonstrated a significant response to therapy.      Interval history:   Cara presents to clinic for toxicity check prior to C6D1 mini-R-CHOP.    She has been feeling well and has remained active.  She did the cooking for her Thanksgiving.  Her appetite has been good and is eating and drinking well.   She has urinary urgency which has been ongoing.  Denies fevers/chills, night sweats, N/V/D/C, bleeding issues, neuropathy, urinary changes, mouth sores, new pains, new lumps/bumps.    Current Outpatient Medications   Medication Sig Dispense Refill     acyclovir (ZOVIRAX) 400 MG tablet Take 1 tablet (400 mg) by mouth every 12 hours. 60 tablet 11     alendronate (FOSAMAX) 70 MG tablet Take 70 mg by mouth every 7 days On Sundays       atorvastatin (LIPITOR) 20 MG tablet Take 20 mg by mouth daily       azaTHIOprine (IMURAN) 50 MG tablet Take 75 mg by mouth daily.       digoxin (LANOXIN) 125 MCG tablet Take 125 mcg by mouth daily.       hydrochlorothiazide  (HYDRODIURIL) 50 MG tablet Take 50 mg by mouth daily       lidocaine-prilocaine (EMLA) 2.5-2.5 % external cream Apply topically daily as needed for moderate pain. 30 g 1     metoprolol tartrate (LOPRESSOR) 25 MG tablet Take 0.5 tablets (12.5 mg) by mouth 2 times daily 60 tablet 0     nystatin (MYCOSTATIN) 325808 UNIT/ML suspension Take 5 mLs (500,000 Units) by mouth 4 times daily. (Patient not taking: Reported on 11/21/2024) 200 mL 1     ondansetron (ZOFRAN) 8 MG tablet Take 1 tablet (8 mg) by mouth every 8 hours as needed for nausea (vomiting). (Patient not taking: Reported on 9/18/2024) 30 tablet 2     oxyCODONE (ROXICODONE) 5 MG tablet Take 1 tablet (5 mg) by mouth every 4 hours as needed for severe pain 9 tablet 0     polyethylene glycol (MIRALAX) 17 GM/Dose powder Take 1 Capful by mouth daily as needed for constipation.       predniSONE (DELTASONE) 50 MG tablet Take 1 tablet (50 mg) by mouth daily. Take on Days 1 through 5. Take first dose in AM prior to chemotherapy. 10 tablet 5     prochlorperazine (COMPAZINE) 10 MG tablet Take 1 tablet (10 mg) by mouth every 6 hours as needed for nausea or vomiting. (Patient not taking: Reported on 11/21/2024) 30 tablet 2     spironolactone (ALDACTONE) 50 MG tablet Take 50 mg by mouth daily       Vitamin D3 (VITAMIN D-1000 MAX ST) 25 mcg (1000 units) tablet Take 1,000 Units by mouth daily       XARELTO ANTICOAGULANT 20 MG TABS tablet Take 20 mg by mouth daily (with dinner).         No Known Allergies      Physical Exam:   11/21/2024  11:11 AM   Vitals    Systolic 131    Diastolic 72    Pulse 71    Respiration 16    Temp 97.8  F (36.6  C)    O2 98 %    Pain Score 0 (None)    Weight 235 lb    Height    BSA    BMI    General: patient appears well in no acute distress, alert and oriented, speech clear and fluid  Skin: no visualized rash or lesions on visualized skin  Resp: Appears to be breathing comfortably without accessory muscle usage, speaking in full sentences, no audible  wheezes or cough.  Psych: Coherent speech, normal rate and volume, able to articulate logical thoughts, able to abstract reason, no tangential thoughts, no hallucinations or delusions  Patient's affect is appropriate.      Labs:   Most Recent 3 CBC's:  Recent Labs   Lab Test 12/11/24  1341 11/21/24  1123 10/31/24  0913 10/11/24  1028   WBC 5.3 8.4 9.2 6.2   HGB 11.5* 11.6* 11.8 11.4*   MCV 98 96 94 93    293 314 301   ANEUTAUTO 3.6  --  7.9 4.6     Most Recent 3 BMP's:  Recent Labs   Lab Test 12/11/24  1341 11/21/24  1123 10/31/24  0913    142 142   POTASSIUM 4.7 4.6 4.5   CHLORIDE 110* 109* 109*   CO2 23 24 22   BUN 17.4 18.5 22.4   CR 0.84 0.84 0.93   ANIONGAP 10 9 11   TREVOR 9.5 9.5 9.6   GLC 93 98 105*   PROTTOTAL 6.9 6.9 7.1   ALBUMIN 3.8 4.0 3.9    Most Recent 3 LFT's:  Recent Labs   Lab Test 12/11/24  1341 11/21/24  1123 10/31/24  0913   AST 23 25 22   ALT 7 10 9   ALKPHOS 146 160* 161*   BILITOTAL 0.5 0.4 0.4    Most Recent 2 TSH and T4:No lab results found.  I reviewed the above labs today.    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  - Mini-R-CHOP (C1D1=8/28/24)- plan for total of 6 cycles  - She has excellent response to chemotherapy so far. We will continue treatment for total 6 cycles.   - Proceed with C6D1 Mini-R-CHOP today (12/12)- she is tolerating treatment well with no side-effects   - Repeat PET 1/6 and follow-up with Dr. Yu 1/13    Ppx:  - Acyclovir 400 mg BID  - Neb Pentamidine monthly- last administered 12/9    Urinary Urgency  Increased urinary urgency but no pain, foul smelling odor or any other changes  - Will check UA today (12/12)    Thyroid nodule:  Incidental finding on PET-- 2.5 cm Left sided -- consider thyroid US after completion of chemotherapy       31  minutes spent on the date of the encounter doing chart review, review of test  results, interpretation of tests, patient visit, and documentation     VALENTINA Montenegro CNP  [facility] [address] [id number] [phone]      Again, thank you for allowing me to participate in the care of your patient.        Sincerely,        VALENTINA Montenegro CNP   Cosentyx Counseling:  I discussed with the patient the risks of Cosentyx including but not limited to worsening of Crohn's disease, immunosuppression, allergic reactions and infections.  The patient understands that monitoring is required including a PPD at baseline and must alert us or the primary physician if symptoms of infection or other concerning signs are noted.

## 2024-12-12 NOTE — PATIENT INSTRUCTIONS
Contact Numbers    [facility] Main Line/TRIAGE: [phone]    Call with chills and/or temperature greater than or equal to 100.5, uncontrolled nausea/vomiting, diarrhea, constipation, dizziness, shortness of breath, chest pain, bleeding, unexplained bruising, or any new/concerning symptoms, questions/concerns.     If you are having any concerning symptoms or wish to speak to a provider before your next infusion visit, please call your care coordinator or triage to notify them so we can adequately serve you.       After Hours: [phone]    If after hours, weekends, or holidays, call main hospital  and ask for Oncology doctor on call.      December 2024 Sunday Monday Tuesday Wednesday Thursday Friday Saturday   1     2     3     4     5     6     7       8     9    PENTAMADINE NEB  11:15 AM   (60 min.)   [facility] PFL PENT   [facility] Pulmonary Function Testing [place] 10     11    LAB CENTRAL   1:15 PM   (15 min.)   [facility] LAB DRAW   [facility] 12    RETURN ACTIVE TREATMENT  10:15 AM   (45 min.)   Hellen Donato APRN CNP   [facility]    ONC INFUSION 5 HR (300 MIN)  11:30 AM   (300 min.)    ONC INFUSION NURSE   [facility] 13     14       15     16     17     18     19     20     21       22     23     24     25     26     27     28       29     30     31 January 2025 Sunday Monday Tuesday Wednesday Thursday Friday Saturday                  1     2     3     4       5     6    LAB  11:00 AM   (15 min.)   [facility] LAB GOLD WAITING   [facility]    PET ONCOLOGY WHOLE BODY  11:30 AM   (30 min.)   UUPET1   [facility] 7     8     9    PENTAMADINE NEB  10:45 AM   (60 min.)   [facility] PFL PENT   [facility]monary Function Testing [place] 10     11       12     13    RETURN CCSL  11:45 AM   (30 min.)   Darya Yu MD    [facility] 14     15     16     17     18       19     20     21     22     23     24     25       26     27     28     29     30     31                          Lab Results:  No results found for this or any previous visit (from the past 12 hours).

## 2024-12-12 NOTE — PROGRESS NOTES
Infusion Nursing Note:  Cara Cool presents today for Cycle 6, day 1 Adriamycin, Vincristine, Cytoxan and Rituximab-abbs.    Patient seen by provider today: Yes: Hellen Donato NP    Note: Patient presents to clinic today feeling well with no questions.  Pt would like to proceed with therapy today. Pt did not request or require any intervention for pain today.    Intravenous Access:  Implanted Port.    Treatment Conditions:  Lab Results   Component Value Date    HGB 11.5 (L) 12/11/2024    WBC 5.3 12/11/2024    ANEU 8.0 11/21/2024    ANEUTAUTO 3.6 12/11/2024     12/11/2024        Lab Results   Component Value Date     12/11/2024    POTASSIUM 4.7 12/11/2024    MAG 1.7 07/17/2024    CR 0.84 12/11/2024    TREVOR 9.5 12/11/2024    BILITOTAL 0.5 12/11/2024    ALBUMIN 3.8 12/11/2024    ALT 7 12/11/2024    AST 23 12/11/2024     ECHO/MUGA completed 7/17/24 EF 70%.    Post Infusion Assessment:  Patient tolerated infusion without incident.  Blood return noted pre and post infusion.  Blood return noted during Adriamycin administration every 2 cc.  Blood return noted prior to/during/after Vincristine administration.  Site patent and intact, free from redness, edema or discomfort.  No evidence of extravasations.  Access discontinued per protocol.    Neulasta Onpro On-Body injector applied to RUQ of abdomen at 1315 with light facing up.  Writer discussed Neulasta injection would start on 12/13/2024 at 1615, approximately 27 hours after application applied today.  Written and Verbal instruction reviewed with patient.  Pt instructed when the dose delivery starts, it will take about 45 minutes to complete.  Pt aware Neulasta Onpro On-Body should have green flashing light and to call triage or on-call MD if injector flashes red or appears to be leaking. Pt aware to keep Onpro On-Body Neualsta 4 inches away from electrical equipment and to avoid showering 4 hours prior to injection.   Neulasta Onpro Lot number:  [id number]    Discharge Plan:   Patient declined prescription refills.  Discharge instructions reviewed with: Patient.  Patient and/or family verbalized understanding of discharge instructions and all questions answered.  AVS to patient via [employer].  Patient will return 1/13/2025 for next appointment with MD.   Patient discharged in stable condition accompanied by: self.  Departure Mode: Ambulatory.    Zahraa Mercer RN

## 2025-01-06 ENCOUNTER — HOSPITAL ENCOUNTER (OUTPATIENT)
Dept: PET IMAGING | Facility: CLINIC | Age: 85
Discharge: HOME | End: 2025-01-06
Attending: STUDENT IN AN ORGANIZED HEALTH CARE EDUCATION/TRAINING PROGRAM
Payer: COMMERCIAL

## 2025-01-06 ENCOUNTER — LAB (OUTPATIENT)
Dept: LAB | Facility: CLINIC | Age: 85
End: 2025-01-06
Attending: PEDIATRICS
Payer: COMMERCIAL

## 2025-01-06 DIAGNOSIS — C83.398 DIFFUSE LARGE B-CELL LYMPHOMA OF EXTRANODAL SITE: ICD-10-CM

## 2025-01-06 LAB
ALBUMIN SERPL BCG-MCNC: 4 G/DL (ref 3.5–5.2)
ALP SERPL-CCNC: 133 U/L (ref 40–150)
ALT SERPL W P-5'-P-CCNC: 11 U/L (ref 0–50)
ANION GAP SERPL CALCULATED.3IONS-SCNC: 12 MMOL/L (ref 7–15)
AST SERPL W P-5'-P-CCNC: 26 U/L (ref 0–45)
BASOPHILS # BLD AUTO: 0 10E3/UL (ref 0–0.2)
BASOPHILS NFR BLD AUTO: 1 %
BILIRUB SERPL-MCNC: 0.5 MG/DL
BUN SERPL-MCNC: 31.3 MG/DL (ref 8–23)
CALCIUM SERPL-MCNC: 9.4 MG/DL (ref 8.8–10.4)
CHLORIDE SERPL-SCNC: 107 MMOL/L (ref 98–107)
CREAT SERPL-MCNC: 0.98 MG/DL (ref 0.51–0.95)
EGFRCR SERPLBLD CKD-EPI 2021: 57 ML/MIN/1.73M2
EOSINOPHIL # BLD AUTO: 0.1 10E3/UL (ref 0–0.7)
EOSINOPHIL NFR BLD AUTO: 1 %
ERYTHROCYTE [DISTWIDTH] IN BLOOD BY AUTOMATED COUNT: 16.5 % (ref 10–15)
GLUCOSE SERPL-MCNC: 88 MG/DL (ref 70–99)
HCO3 SERPL-SCNC: 22 MMOL/L (ref 22–29)
HCT VFR BLD AUTO: 34.8 % (ref 35–47)
HGB BLD-MCNC: 10.8 G/DL (ref 11.7–15.7)
IMM GRANULOCYTES # BLD: 0.3 10E3/UL
IMM GRANULOCYTES NFR BLD: 5 %
LDH SERPL L TO P-CCNC: 372 U/L (ref 0–250)
LYMPHOCYTES # BLD AUTO: 0.7 10E3/UL (ref 0.8–5.3)
LYMPHOCYTES NFR BLD AUTO: 11 %
MCH RBC QN AUTO: 31 PG (ref 26.5–33)
MCHC RBC AUTO-ENTMCNC: 31 G/DL (ref 31.5–36.5)
MCV RBC AUTO: 100 FL (ref 78–100)
MONOCYTES # BLD AUTO: 1 10E3/UL (ref 0–1.3)
MONOCYTES NFR BLD AUTO: 15 %
NEUTROPHILS # BLD AUTO: 4.3 10E3/UL (ref 1.6–8.3)
NEUTROPHILS NFR BLD AUTO: 67 %
NRBC # BLD AUTO: 0 10E3/UL
NRBC BLD AUTO-RTO: 0 /100
PLATELET # BLD AUTO: 305 10E3/UL (ref 150–450)
POTASSIUM SERPL-SCNC: 4.5 MMOL/L (ref 3.4–5.3)
PROT SERPL-MCNC: 6.9 G/DL (ref 6.4–8.3)
RBC # BLD AUTO: 3.48 10E6/UL (ref 3.8–5.2)
SODIUM SERPL-SCNC: 141 MMOL/L (ref 135–145)
WBC # BLD AUTO: 6.4 10E3/UL (ref 4–11)

## 2025-01-06 PROCEDURE — 83615 LACTATE (LD) (LDH) ENZYME: CPT

## 2025-01-06 PROCEDURE — 78816 PET IMAGE W/CT FULL BODY: CPT | Mod: 26 | Performed by: RADIOLOGY

## 2025-01-06 PROCEDURE — 78816 PET IMAGE W/CT FULL BODY: CPT | Mod: PS

## 2025-01-06 PROCEDURE — 85014 HEMATOCRIT: CPT

## 2025-01-06 PROCEDURE — A9552 F18 FDG: HCPCS | Performed by: STUDENT IN AN ORGANIZED HEALTH CARE EDUCATION/TRAINING PROGRAM

## 2025-01-06 PROCEDURE — 343N000001 HC RX 343 MED OP 636: Performed by: STUDENT IN AN ORGANIZED HEALTH CARE EDUCATION/TRAINING PROGRAM

## 2025-01-06 PROCEDURE — 85004 AUTOMATED DIFF WBC COUNT: CPT

## 2025-01-06 PROCEDURE — 84132 ASSAY OF SERUM POTASSIUM: CPT

## 2025-01-06 RX ORDER — HEPARIN SODIUM (PORCINE) LOCK FLUSH IV SOLN 100 UNIT/ML 100 UNIT/ML
500 SOLUTION INTRAVENOUS ONCE
Status: COMPLETED | OUTPATIENT
Start: 2025-01-06 | End: 2025-01-06

## 2025-01-06 RX ORDER — FLUDEOXYGLUCOSE F 18 200 MCI/ML
10-18 INJECTION, SOLUTION INTRAVENOUS ONCE
Status: COMPLETED | OUTPATIENT
Start: 2025-01-06 | End: 2025-01-06

## 2025-01-06 RX ADMIN — HEPARIN SODIUM (PORCINE) LOCK FLUSH IV SOLN 100 UNIT/ML 500 UNITS: 100 SOLUTION at 11:26

## 2025-01-06 RX ADMIN — FLUDEOXYGLUCOSE F 18 10.83 MILLICURIE: 200 INJECTION, SOLUTION INTRAVENOUS at 11:30

## 2025-01-13 ENCOUNTER — ONCOLOGY VISIT (OUTPATIENT)
Dept: ONCOLOGY | Facility: CLINIC | Age: 85
End: 2025-01-13
Attending: STUDENT IN AN ORGANIZED HEALTH CARE EDUCATION/TRAINING PROGRAM
Payer: COMMERCIAL

## 2025-01-13 VITALS
OXYGEN SATURATION: 100 % | HEART RATE: 67 BPM | SYSTOLIC BLOOD PRESSURE: 125 MMHG | TEMPERATURE: 97.8 F | RESPIRATION RATE: 12 BRPM | DIASTOLIC BLOOD PRESSURE: 76 MMHG | BODY MASS INDEX: 39.57 KG/M2 | WEIGHT: 240.4 LBS

## 2025-01-13 DIAGNOSIS — C83.398 DIFFUSE LARGE B-CELL LYMPHOMA OF EXTRANODAL SITE: Primary | ICD-10-CM

## 2025-01-13 DIAGNOSIS — C83.30 DIFFUSE LARGE B-CELL LYMPHOMA, UNSPECIFIED BODY REGION (H): Primary | ICD-10-CM

## 2025-01-13 DIAGNOSIS — M89.9 LYTIC BONE LESIONS ON XRAY: ICD-10-CM

## 2025-01-13 DIAGNOSIS — C83.30 DIFFUSE LARGE B-CELL LYMPHOMA, UNSPECIFIED BODY REGION (H): ICD-10-CM

## 2025-01-13 PROCEDURE — 94640 AIRWAY INHALATION TREATMENT: CPT | Performed by: INTERNAL MEDICINE

## 2025-01-13 PROCEDURE — 94642 AEROSOL INHALATION TREATMENT: CPT | Performed by: INTERNAL MEDICINE

## 2025-01-13 PROCEDURE — G0463 HOSPITAL OUTPT CLINIC VISIT: HCPCS | Performed by: STUDENT IN AN ORGANIZED HEALTH CARE EDUCATION/TRAINING PROGRAM

## 2025-01-13 PROCEDURE — 99214 OFFICE O/P EST MOD 30 MIN: CPT | Mod: GC | Performed by: STUDENT IN AN ORGANIZED HEALTH CARE EDUCATION/TRAINING PROGRAM

## 2025-01-13 RX ORDER — ALBUTEROL SULFATE 90 UG/1
1-2 INHALANT RESPIRATORY (INHALATION)
Start: 2025-01-17

## 2025-01-13 RX ORDER — ALBUTEROL SULFATE 0.83 MG/ML
2.5 SOLUTION RESPIRATORY (INHALATION)
OUTPATIENT
Start: 2025-01-17

## 2025-01-13 RX ORDER — MEPERIDINE HYDROCHLORIDE 25 MG/ML
25 INJECTION INTRAMUSCULAR; INTRAVENOUS; SUBCUTANEOUS
OUTPATIENT
Start: 2025-01-17

## 2025-01-13 RX ORDER — DIPHENHYDRAMINE HYDROCHLORIDE 50 MG/ML
50 INJECTION INTRAMUSCULAR; INTRAVENOUS
Start: 2025-01-17

## 2025-01-13 RX ORDER — METHYLPREDNISOLONE SODIUM SUCCINATE 40 MG/ML
40 INJECTION INTRAMUSCULAR; INTRAVENOUS
Start: 2025-01-17

## 2025-01-13 RX ORDER — PENTAMIDINE ISETHIONATE 300 MG/300MG
300 INHALANT RESPIRATORY (INHALATION) ONCE
Status: COMPLETED | OUTPATIENT
Start: 2025-01-13 | End: 2025-01-13

## 2025-01-13 RX ORDER — PENTAMIDINE ISETHIONATE 300 MG/300MG
300 INHALANT RESPIRATORY (INHALATION) ONCE
Start: 2025-01-17 | End: 2025-01-17

## 2025-01-13 RX ORDER — DIPHENHYDRAMINE HYDROCHLORIDE 50 MG/ML
25 INJECTION INTRAMUSCULAR; INTRAVENOUS
Start: 2025-01-17

## 2025-01-13 RX ORDER — SENNOSIDES 8.6 MG
1 TABLET ORAL PRN
COMMUNITY

## 2025-01-13 RX ORDER — ALBUTEROL SULFATE 0.83 MG/ML
2.5 SOLUTION RESPIRATORY (INHALATION) ONCE
Status: COMPLETED | OUTPATIENT
Start: 2025-01-13 | End: 2025-01-13

## 2025-01-13 RX ORDER — EPINEPHRINE 1 MG/ML
0.3 INJECTION, SOLUTION, CONCENTRATE INTRAVENOUS EVERY 5 MIN PRN
OUTPATIENT
Start: 2025-01-17

## 2025-01-13 RX ORDER — ALBUTEROL SULFATE 0.83 MG/ML
2.5 SOLUTION RESPIRATORY (INHALATION) ONCE
Start: 2025-01-17 | End: 2025-01-17

## 2025-01-13 RX ADMIN — PENTAMIDINE ISETHIONATE 300 MG: 300 INHALANT RESPIRATORY (INHALATION) at 13:29

## 2025-01-13 RX ADMIN — ALBUTEROL SULFATE 2.5 MG: 0.83 SOLUTION RESPIRATORY (INHALATION) at 13:29

## 2025-01-13 ASSESSMENT — PAIN SCALES - GENERAL: PAINLEVEL_OUTOF10: NO PAIN (0)

## 2025-01-13 NOTE — LETTER
1/13/2025      Cara Cool  [address] [place] [id number]      Dear Colleague,    Thank you for referring your patient, Cara Cool, to the [facility]. Please see a copy of my visit note below.    Subjective  Cara is a 84 year old, presenting for the following health issues:  Oncology Clinic Visit (Diffuse large B-cell lymphoma of extranodal site)    HPI     Oncology History Overview Note   This is an 83 y/o female with a PMH of pAF and saddle PE 2021 on Xarelto, HLD, HTN, TIA, OA, autoimmune hepatitis (on azathioprine), IgM/IgG kappa MGUS (dx 11/2017, follows with Dr. Partha Sherman at [facility]), papillary transitional renal cell carcinoma (s/p cryoablation 6/7/2018), hyperparathyroidism s/p adenoma removal 12/2019, coming in for bx positive DLBCL germinal cell subtype with lytic lesions.     Patient presented to the ED 7/17/2024 with severe and progressive mid-back pain x1 week without recent falls or injuries. Initial imaging revealed lytic lesion of left T12 transverse process and T11 fracture. She was admitted for pain control and further work-up. Additional imaging (CT aortic survey, MRI thoracolumbar, bone scan) had evidence of widespread bony metastasis on thoracolumbar spine, proximal sacrum, bilateral ischium, ribs, and possibly L femur w/o clear evidence of primary source. She completed an outpatient bone bx 7/30 via neurosurgery.    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 The Ki-67 proliferation index is estimated to be 50-60% .  Echo shows preserved EF of 60 to 65%.  HIV hepatitis B negative.  FISH is pending for Bcl-2 and MYC rearrangement.  PET scan showsNumerous scattered hypermetabolic lesions throughout axial skeleton largest on most FDG avid is T11.  There is current extension into spinal canal and 2 neural foramina for T10/T11 and  T11/T12.  There is a hypermetabolic lesion on the liver that might be related to lymphoma.  Numerous hypermetabolic lesions in appendicular skeleton including the left humeral head and right mid femur.  Hypermetabolic activity in the short segment narrowing of sigmoid colon.  This scan was discussed with radiologist.  Due to the fact that PET scan is with CT without contrast, reliable assessment of spinal nerve roots was not done and MRI was recommended.  Patient did not have any weakness or neuropathy or sciatica concerning for nerve root involvement.  Patient is quite symptomatic from her disease in terms of bone pains and require treatment in time sensitive manner.  Considering patient's age I do not think patient can tolerate concurrent M R-CHOP.  Majority of patient's disease burden is symptomatic and external compression to nerve root and dural involvement can also be treated by R-CHOP.  So I elected to proceed with R mini CHOP therapy.      She received 2 cycles, tolerated it well. 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 Continues to endorse mild back pain similar to prior, not better but not worse. Otherwise feels fine. Endorses fatigue for 1-2 days after last cycle of chemotherapy but has recovered. No fevers, night sweats, infections, new lumps/bumps or tyesha loss. Continues to move around with cane. Endorses good appetite.           Objective   /76 (BP Location: Right arm, Patient Position: Sitting, Cuff Size: Adult Large)   Pulse 67   Temp 97.8  F (36.6  C) (Oral)   Resp 12   Wt 109 kg (240 lb 6.4 oz)   SpO2 100%    BMI 39.57 kg/m    Body mass index is 39.57 kg/m .  Physical Exam   GENERAL:  Alert oriented well nourished  HEAD: normocephalic atraumatic  SKIN:  no rash, hives, other lesions.  LYMPHATIC: no abnormal lymph nodes palable in cervical, axillary, or supraclavicular area  RESP:  No rales or rhonchi, breath sounds bilaterally equal and vesicular  CV:  No tachycardia, S1 S2 normal No murmur.  GI:  Abdomen soft nontender no hepato or splenomegaly  MUSCULOSKELETAL:  No visible joint redness or swelling, no point tenderness on palpation of spinal processes   NEURO:  No gross weakness gait normal  PSYCH: pleasant affect    Labs: I personally reviewed complete blood count, differential, renal function test, liver function tests LDH.  Mild anemia with hemoglobin 10.8. Platelets, WBC, LFTs and renal function within normal limits. LDH now elevated to 372, previously normal.      Imaging: I personally reviewed PETCT neck/chest/abdomen/pelvis and discussed with the patient.  PET CT with concern for residual disease. There is a tiny focus of abnormal uptake within the right iliac wing and mild residual uptake within the T11 osseous lesion. Additionally, there is intense uptake in sigmoid colon.       Assessment and Plan:    1) Diffuse large B cell lymphoma  Most recently completed 6 cycles mini-R-CHOP and tolerated therapy well. We reviewed results of PET-CT and concern for residual disease in T11 and possibly colon. Also noted to have to abnormal uptake within the right iliac wing, unclear if this is artifact. LDH also now rising concerning for disease progression. We will need to obtain bone biopsy of T11. If biopsy confirms residual disease, this would be primary refractory disease and goal of therapy moving forward would be palliative intent as she is not a CAR T cell therapy or autologous stem cell transplant candidate. We also discussed colonoscopy to evaluate for FDG uptake in colon. She is agreeable to plan.     Plan:  -  IR referral for biopsy  - colonoscopy   - follow up in 1-2 weeks after results of biopsy return     Patient discussed with Dr. Darya Yu MD  Heme/Onc Fellow     I saw and examined the patient with fellow. I discussed assessment and plan. I agree with findings documented in fellows note.    Total time spent on date of service in review of medical records, review of labs, history taking, physical exam, discussion of assessment and plan, counseling and patient education is 30 minutes.    Darya Yu MD  Attending Physician  Pager [phone]\    Again, thank you for allowing me to participate in the care of your patient.        Sincerely,        Darya Yu MD    Electronically signed

## 2025-01-13 NOTE — PROGRESS NOTES
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 Partha Sherman at [facility]), papillary transitional renal cell carcinoma (s/p cryoablation 6/7/2018), hyperparathyroidism s/p adenoma removal 12/2019, coming in for bx positive DLBCL germinal cell subtype with lytic lesions.     Patient presented to the ED 7/17/2024 with severe and progressive mid-back pain x1 week without recent falls or injuries. Initial imaging revealed lytic lesion of left T12 transverse process and T11 fracture. She was admitted for pain control and further work-up. Additional imaging (CT aortic survey, MRI thoracolumbar, bone scan) had evidence of widespread bony metastasis on thoracolumbar spine, proximal sacrum, bilateral ischium, ribs, and possibly L femur w/o clear evidence of primary source. She completed an outpatient bone bx 7/30 via neurosurgery.    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 The Ki-67 proliferation index is estimated to be 50-60% .  Echo shows preserved EF of 60 to 65%.  HIV hepatitis B negative.  FISH is pending for Bcl-2 and MYC rearrangement.  PET scan showsNumerous scattered hypermetabolic lesions throughout axial skeleton largest on most FDG avid is T11.  There is current extension into spinal canal and 2 neural foramina for T10/T11 and T11/T12.  There is a hypermetabolic lesion on the liver that might be related to lymphoma.  Numerous hypermetabolic lesions in appendicular skeleton including the left humeral head and right mid femur.  Hypermetabolic activity in the short  segment narrowing of sigmoid colon.  This scan was discussed with radiologist.  Due to the fact that PET scan is with CT without contrast, reliable assessment of spinal nerve roots was not done and MRI was recommended.  Patient did not have any weakness or neuropathy or sciatica concerning for nerve root involvement.  Patient is quite symptomatic from her disease in terms of bone pains and require treatment in time sensitive manner.  Considering patient's age I do not think patient can tolerate concurrent M R-CHOP.  Majority of patient's disease burden is symptomatic and external compression to nerve root and dural involvement can also be treated by R-CHOP.  So I elected to proceed with R mini CHOP therapy.      She received 2 cycles, tolerated it well. 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 Continues to endorse mild back pain similar to prior, not better but not worse. Otherwise feels fine. Endorses fatigue for 1-2 days after last cycle of chemotherapy but has recovered. No fevers, night sweats, infections, new lumps/bumps or tyesha loss. Continues to move around with cane. Endorses good appetite.           Objective    /76 (BP Location: Right arm, Patient Position: Sitting, Cuff Size: Adult Large)   Pulse 67   Temp 97.8  F (36.6  C) (Oral)   Resp 12   Wt 109 kg (240 lb 6.4 oz)   SpO2 100%   BMI 39.57 kg/m    Body mass index is 39.57 kg/m .  Physical Exam   GENERAL:  Alert oriented well nourished  HEAD: normocephalic atraumatic  SKIN:  no rash, hives, other lesions.  LYMPHATIC: no abnormal lymph nodes palable in cervical,  axillary, or supraclavicular area  RESP:  No rales or rhonchi, breath sounds bilaterally equal and vesicular  CV:  No tachycardia, S1 S2 normal No murmur.  GI:  Abdomen soft nontender no hepato or splenomegaly  MUSCULOSKELETAL:  No visible joint redness or swelling, no point tenderness on palpation of spinal processes   NEURO:  No gross weakness gait normal  PSYCH: pleasant affect    Labs: I personally reviewed complete blood count, differential, renal function test, liver function tests LDH.  Mild anemia with hemoglobin 10.8. Platelets, WBC, LFTs and renal function within normal limits. LDH now elevated to 372, previously normal.      Imaging: I personally reviewed PETCT neck/chest/abdomen/pelvis and discussed with the patient.  PET CT with concern for residual disease. There is a tiny focus of abnormal uptake within the right iliac wing and mild residual uptake within the T11 osseous lesion. Additionally, there is intense uptake in sigmoid colon.       Assessment and Plan:    1) Diffuse large B cell lymphoma  Most recently completed 6 cycles mini-R-CHOP and tolerated therapy well. We reviewed results of PET-CT and concern for residual disease in T11 and possibly colon. Also noted to have to abnormal uptake within the right iliac wing, unclear if this is artifact. LDH also now rising concerning for disease progression. We will need to obtain bone biopsy of T11. If biopsy confirms residual disease, this would be primary refractory disease and goal of therapy moving forward would be palliative intent as she is not a CAR T cell therapy or autologous stem cell transplant candidate. We also discussed colonoscopy to evaluate for FDG uptake in colon. She is agreeable to plan.     Plan:  - IR referral for biopsy  - colonoscopy   - follow up in 1-2 weeks after results of biopsy return     Patient discussed with Dr. Darya Yu MD  Heme/Onc Fellow     I saw and examined the patient with fellow. I discussed  assessment and plan. I agree with findings documented in fellows note.    Total time spent on date of service in review of medical records, review of labs, history taking, physical exam, discussion of assessment and plan, counseling and patient education is 30 minutes.    Darya Yu MD  Attending Physician  Pager [phone]\

## 2025-01-13 NOTE — PROGRESS NOTES
Cara Cool  Patient was seen today for a Pentamidine nebulizer tx ordered by Darya Brito.    Patient was first given 2.5 mg of albuterol nebulizer, after which Pentamidine 300 mg (Lot # [id number]) mixed with 6cc Sterile H20 was administered through a filtered nebulizer.    Pre-treatment: SpO2 = 98%   HR = 73   BBS = Clear   Post-treatment: SpO2 = 96%  HR = 71  BBS = Clear    No adverse side effects noted by the patient.    This service today was provided under the direct supervision of Dr. Min, who was available if needed.     Procedure was completed by ALAYNA MARTEL.

## 2025-01-13 NOTE — CONSULTS
"    Outpatient Neuro Radiology Referral  01/13/25    Referring Provider: Dr. Sana Yu with Oncology  Neuro Rad Referral Request: T11 biopsy    Recommendations/Plan:   Patient is approved and will be scheduled for a CT-guided T11 biopsy. See series 4, image 162.   Case and imaging was reviewed with Neuro Radiology provider Dr. Lechuga.  Recommendations also relayed Epic messaging.  Neuro Rad referral converted to procedure order. Surg path, flow-cytometry, & FISH entered under referrer.    If requesting team would like sample sent for anything else please use the IR order set \"IR RAD Biopsy or Fluid Aspirate Specimens\" to select your necessary diagnostic labs and pend for admission. If there are labs you desire that are not found in this order set or you have questions regarding specific diagnostic labs please call the associated lab personnel.     Brief History:    Cara Cool is a 84 year old female with history of DLBCL s/p mini R chop with recent PET imaging demonstrating FDG uptake at T11. Oncology is needing tissue confirmation. If this is residual lymphoma, then it is primary refractory disease, which needs tissue confirmation - per Dr. Yu.    Pertinent Medications:    Current Outpatient Medications   Medication Sig Dispense Refill    acyclovir (ZOVIRAX) 400 MG tablet Take 1 tablet (400 mg) by mouth every 12 hours. 60 tablet 11    alendronate (FOSAMAX) 70 MG tablet Take 70 mg by mouth every 7 days On Sundays      atorvastatin (LIPITOR) 20 MG tablet Take 20 mg by mouth daily      azaTHIOprine (IMURAN) 50 MG tablet Take 75 mg by mouth daily. (Patient not taking: Reported on 1/13/2025)      digoxin (LANOXIN) 125 MCG tablet Take 125 mcg by mouth daily. (Patient not taking: Reported on 1/13/2025)      hydrochlorothiazide (HYDRODIURIL) 50 MG tablet Take 50 mg by mouth daily      lidocaine-prilocaine (EMLA) 2.5-2.5 % external cream Apply topically daily as needed for moderate pain. 30 g 1    metoprolol " tartrate (LOPRESSOR) 25 MG tablet Take 0.5 tablets (12.5 mg) by mouth 2 times daily 60 tablet 0    nystatin (MYCOSTATIN) 541322 UNIT/ML suspension Take 5 mLs (500,000 Units) by mouth 4 times daily. (Patient not taking: Reported on 10/31/2024) 200 mL 1    ondansetron (ZOFRAN) 8 MG tablet Take 1 tablet (8 mg) by mouth every 8 hours as needed for nausea (vomiting). (Patient not taking: Reported on 1/13/2025) 30 tablet 2    oxyCODONE (ROXICODONE) 5 MG tablet Take 1 tablet (5 mg) by mouth every 4 hours as needed for severe pain (Patient not taking: Reported on 1/13/2025) 9 tablet 0    polyethylene glycol (MIRALAX) 17 GM/Dose powder Take 1 Capful by mouth daily as needed for constipation. (Patient not taking: Reported on 1/13/2025)      predniSONE (DELTASONE) 50 MG tablet Take 1 tablet (50 mg) by mouth daily. Take on Days 1 through 5. Take first dose in AM prior to chemotherapy. 10 tablet 5    prochlorperazine (COMPAZINE) 10 MG tablet Take 1 tablet (10 mg) by mouth every 6 hours as needed for nausea or vomiting. (Patient not taking: Reported on 10/31/2024) 30 tablet 2    sennosides (SENOKOT) 8.6 MG tablet Take 1 tablet by mouth as needed for constipation.      spironolactone (ALDACTONE) 50 MG tablet Take 50 mg by mouth daily      Vitamin D3 (VITAMIN D-1000 MAX ST) 25 mcg (1000 units) tablet Take 1,000 Units by mouth daily      XARELTO ANTICOAGULANT 20 MG TABS tablet Take 20 mg by mouth daily (with dinner).       No current facility-administered medications for this visit.     Facility-Administered Medications Ordered in Other Visits   Medication Dose Route Frequency Provider Last Rate Last Admin    albuterol (PROVENTIL) neb solution 2.5 mg  2.5 mg Nebulization Once Darya Yu MD        pentamidine (NEBUPENT) neb solution 300 mg  300 mg Inhalation Once Darya Yu MD           Pertinent Imaging Reviewed:    PET from 1/6/25    Most Recent Labs:  Lab Results   Component Value Date    WBC 6.4 01/06/2025     Lab  Results   Component Value Date    RBC 3.48 01/06/2025     Lab Results   Component Value Date    HGB 10.8 01/06/2025     Lab Results   Component Value Date    HCT 34.8 01/06/2025     Lab Results   Component Value Date     01/06/2025    Last Comprehensive Metabolic Panel:  Lab Results   Component Value Date     01/06/2025    POTASSIUM 4.5 01/06/2025    CHLORIDE 107 01/06/2025    CO2 22 01/06/2025    ANIONGAP 12 01/06/2025    GLC 88 01/06/2025    BUN 31.3 (H) 01/06/2025    CR 0.98 (H) 01/06/2025    GFRESTIMATED 57 (L) 01/06/2025    TREVOR 9.4 01/06/2025      INR   Date Value Ref Range Status   09/19/2024 1.59 (H) 0.85 - 1.15 Final          Authorizing: Sana Yu MD in [facility]     Referral: [id number] (Pending Review)       Expires: 4/13/2025 Priority: Urgent: 3-5 Days   Assign to: FABIOLA VERMA     Diagnosis: Diffuse large B-cell lymphoma, unspecified body region (H) [C83.30]     Order Specific Questions   What is the reason for the IR order request?   Biopsy            What type of biopsy is needed?   Bone            Which bone needs a biopsy?   T11            Patients clinical information/history?   hx of DLBCL s/p mini R chop, now with FDG uptake in T11, need biopsy to determine if there is residual lymphoma            Is this biopsy procedure for research?   No            Specimen orders should be placed per site protocol   Acknowledge            Is there a specific location the exam needs to be scheduled at?   No specific location required            Call Back [facility]            Is the patient on aspirin, Plavix or blood thinners?   Yes - Patient may be asked to stop taking medications           Fabiola Verma PA-C  Interventional Radiology   [phone] (IR RN triage)

## 2025-01-13 NOTE — NURSING NOTE
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    Weight as of this encounter: 109 kg (240 lb 6.4 oz). Body surface area is 2.24 meters squared.  No Pain (0) Comment: Data Unavailable   No LMP recorded. Patient has had a hysterectomy.  Allergies reviewed: Yes  Medications reviewed: Yes    Medications: Medication refills not needed today.  Pharmacy name entered into [employer]:    [facility] [address] [id number] - [place] [id number] AT [address]    Frailty Screening:   Is the patient here for a new oncology consult visit in cancer care? 2. No      Clinical concerns: Patient has been feeling pain at the site of her cancer when she moves or sits in a certain type of way. She describes the pain as dull and achy.       Alisha Delgado, EMT            "

## 2025-01-30 ENCOUNTER — MYC MEDICAL ADVICE (OUTPATIENT)
Dept: INTERVENTIONAL RADIOLOGY/VASCULAR | Facility: CLINIC | Age: 85
End: 2025-01-30
Payer: COMMERCIAL

## 2025-02-06 NOTE — TELEPHONE ENCOUNTER
PrismaStar message has been read. Left VM on patient's number to call with questions. Daughter declined instructions, will call back later when she feels better.

## 2025-02-10 ENCOUNTER — LAB (OUTPATIENT)
Dept: LAB | Facility: CLINIC | Age: 85
End: 2025-02-10
Attending: STUDENT IN AN ORGANIZED HEALTH CARE EDUCATION/TRAINING PROGRAM
Payer: COMMERCIAL

## 2025-02-10 DIAGNOSIS — C83.398 DIFFUSE LARGE B-CELL LYMPHOMA OF EXTRANODAL SITE: Primary | ICD-10-CM

## 2025-02-10 DIAGNOSIS — C83.30 DIFFUSE LARGE B-CELL LYMPHOMA, UNSPECIFIED BODY REGION (H): ICD-10-CM

## 2025-02-10 LAB
ALBUMIN SERPL BCG-MCNC: 3.8 G/DL (ref 3.5–5.2)
ALP SERPL-CCNC: 147 U/L (ref 40–150)
ALT SERPL W P-5'-P-CCNC: 13 U/L (ref 0–50)
ANION GAP SERPL CALCULATED.3IONS-SCNC: 9 MMOL/L (ref 7–15)
AST SERPL W P-5'-P-CCNC: 24 U/L (ref 0–45)
BASOPHILS # BLD AUTO: 0 10E3/UL (ref 0–0.2)
BASOPHILS NFR BLD AUTO: 0 %
BILIRUB SERPL-MCNC: 0.7 MG/DL
BUN SERPL-MCNC: 23.5 MG/DL (ref 8–23)
CALCIUM SERPL-MCNC: 9.1 MG/DL (ref 8.8–10.4)
CHLORIDE SERPL-SCNC: 111 MMOL/L (ref 98–107)
CREAT SERPL-MCNC: 0.9 MG/DL (ref 0.51–0.95)
EGFRCR SERPLBLD CKD-EPI 2021: 64 ML/MIN/1.73M2
EOSINOPHIL # BLD AUTO: 0.1 10E3/UL (ref 0–0.7)
EOSINOPHIL NFR BLD AUTO: 2 %
ERYTHROCYTE [DISTWIDTH] IN BLOOD BY AUTOMATED COUNT: 14.5 % (ref 10–15)
GLUCOSE SERPL-MCNC: 102 MG/DL (ref 70–99)
HCO3 SERPL-SCNC: 23 MMOL/L (ref 22–29)
HCT VFR BLD AUTO: 35.8 % (ref 35–47)
HGB BLD-MCNC: 11.3 G/DL (ref 11.7–15.7)
IMM GRANULOCYTES # BLD: 0 10E3/UL
IMM GRANULOCYTES NFR BLD: 0 %
LDH SERPL L TO P-CCNC: 213 U/L (ref 0–250)
LYMPHOCYTES # BLD AUTO: 1.6 10E3/UL (ref 0.8–5.3)
LYMPHOCYTES NFR BLD AUTO: 28 %
MCH RBC QN AUTO: 30.3 PG (ref 26.5–33)
MCHC RBC AUTO-ENTMCNC: 31.6 G/DL (ref 31.5–36.5)
MCV RBC AUTO: 96 FL (ref 78–100)
MONOCYTES # BLD AUTO: 0.7 10E3/UL (ref 0–1.3)
MONOCYTES NFR BLD AUTO: 13 %
NEUTROPHILS # BLD AUTO: 3.4 10E3/UL (ref 1.6–8.3)
NEUTROPHILS NFR BLD AUTO: 58 %
NRBC # BLD AUTO: 0 10E3/UL
NRBC BLD AUTO-RTO: 0 /100
PLATELET # BLD AUTO: 207 10E3/UL (ref 150–450)
POTASSIUM SERPL-SCNC: 4.3 MMOL/L (ref 3.4–5.3)
PROT SERPL-MCNC: 6.8 G/DL (ref 6.4–8.3)
RBC # BLD AUTO: 3.73 10E6/UL (ref 3.8–5.2)
SODIUM SERPL-SCNC: 143 MMOL/L (ref 135–145)
WBC # BLD AUTO: 5.9 10E3/UL (ref 4–11)

## 2025-02-10 PROCEDURE — 36591 DRAW BLOOD OFF VENOUS DEVICE: CPT

## 2025-02-10 PROCEDURE — 250N000011 HC RX IP 250 OP 636: Performed by: STUDENT IN AN ORGANIZED HEALTH CARE EDUCATION/TRAINING PROGRAM

## 2025-02-10 PROCEDURE — 82247 BILIRUBIN TOTAL: CPT

## 2025-02-10 PROCEDURE — 94640 AIRWAY INHALATION TREATMENT: CPT | Performed by: STUDENT IN AN ORGANIZED HEALTH CARE EDUCATION/TRAINING PROGRAM

## 2025-02-10 PROCEDURE — 85049 AUTOMATED PLATELET COUNT: CPT

## 2025-02-10 PROCEDURE — 94642 AEROSOL INHALATION TREATMENT: CPT | Performed by: STUDENT IN AN ORGANIZED HEALTH CARE EDUCATION/TRAINING PROGRAM

## 2025-02-10 PROCEDURE — 83615 LACTATE (LD) (LDH) ENZYME: CPT

## 2025-02-10 RX ORDER — METHYLPREDNISOLONE SODIUM SUCCINATE 40 MG/ML
40 INJECTION INTRAMUSCULAR; INTRAVENOUS
Start: 2025-02-14

## 2025-02-10 RX ORDER — MEPERIDINE HYDROCHLORIDE 25 MG/ML
25 INJECTION INTRAMUSCULAR; INTRAVENOUS; SUBCUTANEOUS
OUTPATIENT
Start: 2025-02-14

## 2025-02-10 RX ORDER — ALBUTEROL SULFATE 0.83 MG/ML
2.5 SOLUTION RESPIRATORY (INHALATION) ONCE
Status: COMPLETED | OUTPATIENT
Start: 2025-02-10 | End: 2025-02-10

## 2025-02-10 RX ORDER — ALBUTEROL SULFATE 0.83 MG/ML
2.5 SOLUTION RESPIRATORY (INHALATION) ONCE
Start: 2025-02-14 | End: 2025-02-14

## 2025-02-10 RX ORDER — DIPHENHYDRAMINE HYDROCHLORIDE 50 MG/ML
25 INJECTION INTRAMUSCULAR; INTRAVENOUS
Start: 2025-02-14

## 2025-02-10 RX ORDER — PENTAMIDINE ISETHIONATE 300 MG/300MG
300 INHALANT RESPIRATORY (INHALATION) ONCE
Start: 2025-02-14 | End: 2025-02-14

## 2025-02-10 RX ORDER — ALBUTEROL SULFATE 0.83 MG/ML
2.5 SOLUTION RESPIRATORY (INHALATION)
OUTPATIENT
Start: 2025-02-14

## 2025-02-10 RX ORDER — PENTAMIDINE ISETHIONATE 300 MG/300MG
300 INHALANT RESPIRATORY (INHALATION) ONCE
Status: COMPLETED | OUTPATIENT
Start: 2025-02-10 | End: 2025-02-10

## 2025-02-10 RX ORDER — EPINEPHRINE 1 MG/ML
0.3 INJECTION, SOLUTION, CONCENTRATE INTRAVENOUS EVERY 5 MIN PRN
OUTPATIENT
Start: 2025-02-14

## 2025-02-10 RX ORDER — ALBUTEROL SULFATE 90 UG/1
1-2 INHALANT RESPIRATORY (INHALATION)
Start: 2025-02-14

## 2025-02-10 RX ORDER — HEPARIN SODIUM (PORCINE) LOCK FLUSH IV SOLN 100 UNIT/ML 100 UNIT/ML
5 SOLUTION INTRAVENOUS ONCE
Status: COMPLETED | OUTPATIENT
Start: 2025-02-10 | End: 2025-02-10

## 2025-02-10 RX ORDER — DIPHENHYDRAMINE HYDROCHLORIDE 50 MG/ML
50 INJECTION INTRAMUSCULAR; INTRAVENOUS
Start: 2025-02-14

## 2025-02-10 RX ADMIN — ALBUTEROL SULFATE 2.5 MG: 0.83 SOLUTION RESPIRATORY (INHALATION) at 11:46

## 2025-02-10 RX ADMIN — PENTAMIDINE ISETHIONATE 300 MG: 300 INHALANT RESPIRATORY (INHALATION) at 11:46

## 2025-02-10 RX ADMIN — HEPARIN 5 ML: 100 SYRINGE at 12:30

## 2025-02-10 NOTE — NURSING NOTE
Chief Complaint   Patient presents with    Blood Draw     Port blood draw by lab RN       Port accessed with blood draw and heparin flush by lab RN.     Lizet Weeks RN

## 2025-02-10 NOTE — PROGRESS NOTES
Patient was seen today for a Pentamidine nebulizer tx ordered by Dr. Yu.    Patient was first given 2.5 mg of albuterol nebulizer, after which Pentamidine 300 mg (Lot # [id number]) mixed with 6cc Sterile H20 was administered through a filtered nebulizer.    Pre-treatment: SpO2 = 98%   HR = 70   BBS = clear   Post-treatment: SpO2 = 98%  HR = 70  BBS = clear    No adverse side effects noted by the patient.    This service today was provided under the direct supervision of Dr. Moreno, who was available if needed.     Procedure was completed by Khushboo Odonnell.

## 2025-02-11 RX ORDER — LIDOCAINE 40 MG/G
CREAM TOPICAL
Status: CANCELLED | OUTPATIENT
Start: 2025-02-11

## 2025-02-13 ENCOUNTER — HOSPITAL ENCOUNTER (OUTPATIENT)
Facility: CLINIC | Age: 85
Discharge: HOME | End: 2025-02-13
Attending: RADIOLOGY | Admitting: RADIOLOGY
Payer: COMMERCIAL

## 2025-02-13 ENCOUNTER — APPOINTMENT (OUTPATIENT)
Dept: MEDSURG UNIT | Facility: CLINIC | Age: 85
End: 2025-02-13
Attending: RADIOLOGY
Payer: COMMERCIAL

## 2025-02-13 ENCOUNTER — APPOINTMENT (OUTPATIENT)
Dept: INTERVENTIONAL RADIOLOGY/VASCULAR | Facility: CLINIC | Age: 85
End: 2025-02-13
Payer: COMMERCIAL

## 2025-02-13 VITALS
OXYGEN SATURATION: 100 % | BODY MASS INDEX: 40 KG/M2 | WEIGHT: 243 LBS | DIASTOLIC BLOOD PRESSURE: 62 MMHG | HEART RATE: 64 BPM | SYSTOLIC BLOOD PRESSURE: 113 MMHG | RESPIRATION RATE: 16 BRPM | TEMPERATURE: 98.1 F

## 2025-02-13 DIAGNOSIS — M89.9 LYTIC BONE LESIONS ON XRAY: ICD-10-CM

## 2025-02-13 DIAGNOSIS — C83.398 DIFFUSE LARGE B-CELL LYMPHOMA OF EXTRANODAL SITE: ICD-10-CM

## 2025-02-13 LAB
INR PPP: 1.05 (ref 0.85–1.15)
PATH REPORT.COMMENTS IMP SPEC: NORMAL
PATH REPORT.FINAL DX SPEC: NORMAL
PATH REPORT.MICROSCOPIC SPEC OTHER STN: NORMAL
PATH REPORT.RELEVANT HX SPEC: NORMAL

## 2025-02-13 PROCEDURE — 77012 CT SCAN FOR NEEDLE BIOPSY: CPT | Mod: 26 | Performed by: RADIOLOGY

## 2025-02-13 PROCEDURE — 77012 CT SCAN FOR NEEDLE BIOPSY: CPT

## 2025-02-13 PROCEDURE — 88184 FLOWCYTOMETRY/ TC 1 MARKER: CPT | Performed by: STUDENT IN AN ORGANIZED HEALTH CARE EDUCATION/TRAINING PROGRAM

## 2025-02-13 PROCEDURE — 99152 MOD SED SAME PHYS/QHP 5/>YRS: CPT | Performed by: RADIOLOGY

## 2025-02-13 PROCEDURE — 85610 PROTHROMBIN TIME: CPT | Performed by: PHYSICIAN ASSISTANT

## 2025-02-13 PROCEDURE — 250N000011 HC RX IP 250 OP 636: Performed by: RADIOLOGY

## 2025-02-13 PROCEDURE — 88307 TISSUE EXAM BY PATHOLOGIST: CPT | Mod: 26 | Performed by: PATHOLOGY

## 2025-02-13 PROCEDURE — 272N000155 HC KIT CR15

## 2025-02-13 PROCEDURE — 20225 BONE BIOPSY TROCAR/NDL DEEP: CPT | Performed by: RADIOLOGY

## 2025-02-13 PROCEDURE — 88342 IMHCHEM/IMCYTCHM 1ST ANTB: CPT | Mod: 26 | Performed by: PATHOLOGY

## 2025-02-13 PROCEDURE — 88188 FLOWCYTOMETRY/READ 9-15: CPT | Performed by: STUDENT IN AN ORGANIZED HEALTH CARE EDUCATION/TRAINING PROGRAM

## 2025-02-13 PROCEDURE — 36415 COLL VENOUS BLD VENIPUNCTURE: CPT | Performed by: PHYSICIAN ASSISTANT

## 2025-02-13 PROCEDURE — 88341 IMHCHEM/IMCYTCHM EA ADD ANTB: CPT | Mod: 26 | Performed by: PATHOLOGY

## 2025-02-13 PROCEDURE — 88311 DECALCIFY TISSUE: CPT | Mod: 26 | Performed by: PATHOLOGY

## 2025-02-13 PROCEDURE — 999N000142 HC STATISTIC PROCEDURE PREP ONLY

## 2025-02-13 PROCEDURE — 88341 IMHCHEM/IMCYTCHM EA ADD ANTB: CPT | Mod: TC | Performed by: STUDENT IN AN ORGANIZED HEALTH CARE EDUCATION/TRAINING PROGRAM

## 2025-02-13 PROCEDURE — 250N000009 HC RX 250: Performed by: RADIOLOGY

## 2025-02-13 PROCEDURE — 999N000133 HC STATISTIC PP CARE STAGE 2

## 2025-02-13 PROCEDURE — 250N000013 HC RX MED GY IP 250 OP 250 PS 637: Performed by: STUDENT IN AN ORGANIZED HEALTH CARE EDUCATION/TRAINING PROGRAM

## 2025-02-13 RX ORDER — LIDOCAINE 40 MG/G
CREAM TOPICAL
Status: DISCONTINUED | OUTPATIENT
Start: 2025-02-13 | End: 2025-02-13 | Stop reason: HOSPADM

## 2025-02-13 RX ORDER — ACETAMINOPHEN 325 MG/1
650 TABLET ORAL ONCE
Status: COMPLETED | OUTPATIENT
Start: 2025-02-13 | End: 2025-02-13

## 2025-02-13 RX ORDER — NALOXONE HYDROCHLORIDE 0.4 MG/ML
0.2 INJECTION, SOLUTION INTRAMUSCULAR; INTRAVENOUS; SUBCUTANEOUS
Status: DISCONTINUED | OUTPATIENT
Start: 2025-02-13 | End: 2025-02-13 | Stop reason: HOSPADM

## 2025-02-13 RX ORDER — FENTANYL CITRATE 50 UG/ML
25-50 INJECTION, SOLUTION INTRAMUSCULAR; INTRAVENOUS EVERY 5 MIN PRN
Status: DISCONTINUED | OUTPATIENT
Start: 2025-02-13 | End: 2025-02-13 | Stop reason: HOSPADM

## 2025-02-13 RX ORDER — FLUMAZENIL 0.1 MG/ML
0.2 INJECTION, SOLUTION INTRAVENOUS
Status: DISCONTINUED | OUTPATIENT
Start: 2025-02-13 | End: 2025-02-13 | Stop reason: HOSPADM

## 2025-02-13 RX ORDER — NALOXONE HYDROCHLORIDE 0.4 MG/ML
0.4 INJECTION, SOLUTION INTRAMUSCULAR; INTRAVENOUS; SUBCUTANEOUS
Status: DISCONTINUED | OUTPATIENT
Start: 2025-02-13 | End: 2025-02-13 | Stop reason: HOSPADM

## 2025-02-13 RX ORDER — HEPARIN SODIUM (PORCINE) LOCK FLUSH IV SOLN 100 UNIT/ML 100 UNIT/ML
5 SOLUTION INTRAVENOUS ONCE
Status: DISCONTINUED | OUTPATIENT
Start: 2025-02-13 | End: 2025-02-13 | Stop reason: HOSPADM

## 2025-02-13 RX ADMIN — FENTANYL CITRATE 25 MCG: 50 INJECTION, SOLUTION INTRAMUSCULAR; INTRAVENOUS at 10:17

## 2025-02-13 RX ADMIN — ACETAMINOPHEN 650 MG: 325 TABLET, FILM COATED ORAL at 11:26

## 2025-02-13 RX ADMIN — MIDAZOLAM 0.5 MG: 1 INJECTION INTRAMUSCULAR; INTRAVENOUS at 10:08

## 2025-02-13 RX ADMIN — FENTANYL CITRATE 25 MCG: 50 INJECTION, SOLUTION INTRAMUSCULAR; INTRAVENOUS at 09:41

## 2025-02-13 RX ADMIN — LIDOCAINE HYDROCHLORIDE 6 ML: 10 INJECTION, SOLUTION EPIDURAL; INFILTRATION; INTRACAUDAL; PERINEURAL at 10:09

## 2025-02-13 RX ADMIN — FENTANYL CITRATE 25 MCG: 50 INJECTION, SOLUTION INTRAMUSCULAR; INTRAVENOUS at 10:08

## 2025-02-13 RX ADMIN — FENTANYL CITRATE 25 MCG: 50 INJECTION, SOLUTION INTRAMUSCULAR; INTRAVENOUS at 09:49

## 2025-02-13 RX ADMIN — MIDAZOLAM 0.5 MG: 1 INJECTION INTRAMUSCULAR; INTRAVENOUS at 09:41

## 2025-02-13 RX ADMIN — MIDAZOLAM 1 MG: 1 INJECTION INTRAMUSCULAR; INTRAVENOUS at 09:57

## 2025-02-13 RX ADMIN — MIDAZOLAM 0.5 MG: 1 INJECTION INTRAMUSCULAR; INTRAVENOUS at 10:17

## 2025-02-13 RX ADMIN — MIDAZOLAM 0.5 MG: 1 INJECTION INTRAMUSCULAR; INTRAVENOUS at 09:49

## 2025-02-13 ASSESSMENT — ACTIVITIES OF DAILY LIVING (ADL)
ADLS_ACUITY_SCORE: 59

## 2025-02-13 NOTE — SEDATION DOCUMENTATION
Interventional Radiology Pre-Procedure Sedation Assessment   Time of Assessment: 9:09 AM    Expected Level: Moderate Sedation    Indication: Sedation is required for the following type of Procedure: Biopsy    Sedation and procedural consent: Risks, benefits and alternatives were discussed with Patient    PO Intake: Appropriately NPO for procedure    ASA Class: Class 2 - MILD SYSTEMIC DISEASE, NO ACUTE PROBLEMS, NO FUNCTIONAL LIMITATIONS.    Mallampati: Grade 3:  Soft palate visible, posterior pharyngeal wall not visible    Lungs: Normal breath sounds bilaterally.    Heart: Normal heart sounds and rate    History and physical reviewed and no updates needed. I have reviewed the lab findings, diagnostic data, medications, and the plan for sedation. I have determined this patient to be an appropriate candidate for the planned sedation and procedure and have reassessed the patient IMMEDIATELY PRIOR to sedation and procedure.    Joan Kearns MD

## 2025-02-13 NOTE — PROGRESS NOTES
Patient tolerated bedrest  VSS and puncture site remained dry and intact  She is eating and drinking, ambulating and voided.  Port a cath heplocked and deaccessed   Escorted patient and daughter to lobby for ride home.

## 2025-02-13 NOTE — PROGRESS NOTES
Cara arrived on 2A for T11 bone biopsy  Sisi, daughter at bedside  Port accessed and INR drawn  Awaiting results and consent

## 2025-02-13 NOTE — DISCHARGE INSTRUCTIONS
[facility]    Interventional Radiology  Patient Instructions Following Bone Biopsy    AFTER YOU GO HOME  If you were given sedation, for the first 24 hours: we recommend that an adult stay with you, DO NOT drive or operate machinery at home or at work, DO NOT smoke, DO NOT make any important or legal decisions.  DO NOT drink alcoholic beverages the day of your procedure  Drink plenty of fluids   Resume your regular diet, unless otherwise instructed by your Primary Physician  Relax and take it easy for 48 hours  DO NOT do any strenuous exercise or lifting (> 10 lbs) for 2-3 days following your procedure  Keep the dressing dry and in place for 24 hours. Replace with Band aid for 2 days.  Never leave a wet dressing in place.  Do not take a shower for at least 24 hours following your procedure. No tub bath, hot tub, or swimming for 5 days.  There should be minimum drainage from the biopsy site    CALL THE PHYSICIAN IF:  You start bleeding from the procedure site.  If you do start to bleed from that site, lie down flat and hold pressure on the site for a minimum of 10 minutes.  Your physician will tell you if you need to return to the hospital  You develop nausea or vomiting  You have excessive swelling, redness, or tenderness at the site  You have drainage that looks like it is infected.  You experience severe pain  You develop hives or a rash or unexplained itching  You develop shortness of breath  You develop a temperature of 101 degrees F or greater  You develop bloody clots or red urine after you are discharged  You develop chest pain or cough up blood, lightheadedness or fainting    ADDITIONAL INSTRUCTIONS: Continue to hold Xarelto for 24 hours. You may resume Friday February 14th anytime after 11am.    [facility] INTERVENTIONAL RADIOLOGY DEPARTMENT  Procedure Physician: Dr. Kearns                                     Date of procedure: February 13, 2025  Telephone Numbers: [phone]       Monday-Friday 7:30 am to 4:00 pm  [phone] After 4:00 pm Monday-Friday, Weekends & Holidays.   Ask for the Neuro Radiologist on call.  Someone is on call 24 hrs/day

## 2025-02-13 NOTE — IR NOTE
Patient Name: Cara Cool  Medical Record Number: [id number]  Today's Date: 2/13/2025    Procedure: Ct guided spine biopsy  Proceduralist: Dr. Kearns  Pathology present: No, pathology declined due to sample being bone.    Procedure Start: 0957  Procedure end: 1026  Sedation medications administered: 3 mg versed & 100 mcg fentanyl     Report given to:  RN  : N/A    Other Notes: Pt arrived to IR room CT 2 from . Consent reviewed. Pt denies any questions or concerns regarding procedure. Pt positioned prone and monitored per protocol. Pt tolerated procedure without any noted complications. Pt transferred back to .

## 2025-02-15 ENCOUNTER — TELEPHONE (OUTPATIENT)
Dept: ONCOLOGY | Facility: CLINIC | Age: 85
End: 2025-02-15
Payer: COMMERCIAL

## 2025-02-15 DIAGNOSIS — C83.30 DIFFUSE LARGE B-CELL LYMPHOMA, UNSPECIFIED BODY REGION (H): Primary | ICD-10-CM

## 2025-02-17 LAB
PATH REPORT.COMMENTS IMP SPEC: NORMAL
PATH REPORT.FINAL DX SPEC: NORMAL
PATH REPORT.GROSS SPEC: NORMAL
PATH REPORT.MICROSCOPIC SPEC OTHER STN: NORMAL
PATH REPORT.RELEVANT HX SPEC: NORMAL
PHOTO IMAGE: NORMAL

## 2025-02-20 ENCOUNTER — VIRTUAL VISIT (OUTPATIENT)
Dept: ONCOLOGY | Facility: CLINIC | Age: 85
End: 2025-02-20
Attending: STUDENT IN AN ORGANIZED HEALTH CARE EDUCATION/TRAINING PROGRAM
Payer: COMMERCIAL

## 2025-02-20 VITALS — HEIGHT: 67 IN | WEIGHT: 243 LBS | BODY MASS INDEX: 38.14 KG/M2

## 2025-02-20 DIAGNOSIS — C83.398 DIFFUSE LARGE B-CELL LYMPHOMA OF EXTRANODAL SITE: Primary | ICD-10-CM

## 2025-02-20 PROCEDURE — 98006 SYNCH AUDIO-VIDEO EST MOD 30: CPT | Performed by: STUDENT IN AN ORGANIZED HEALTH CARE EDUCATION/TRAINING PROGRAM

## 2025-02-20 ASSESSMENT — PAIN SCALES - GENERAL: PAINLEVEL_OUTOF10: NO PAIN (0)

## 2025-02-20 NOTE — PROGRESS NOTES
Virtual Visit Details    Type of service:  Video Visit   Video Start Time:  2:10 pm  Video End Time: 2:30 pm    Originating Location (pt. Location): Home    Distant Location (provider location):  On-site  Platform used for Video Visit: Altaf    CC: This is an 83 y/o female coming in for follow up of DLBCL.    HPI:    Oncology History Overview Note   This is an 83 y/o female with a PMH of pAF and saddle PE 2021 on Xarelto, HLD, HTN, TIA, OA, autoimmune hepatitis (on azathioprine), IgM/IgG kappa MGUS (dx 11/2017, follows with Dr. Partha Sherman at [facility]), papillary transitional renal cell carcinoma (s/p cryoablation 6/7/2018), hyperparathyroidism s/p adenoma removal 12/2019, coming in for bx positive DLBCL germinal cell subtype with lytic lesions.     Patient presented to the ED 7/17/2024 with severe and progressive mid-back pain x1 week without recent falls or injuries. Initial imaging revealed lytic lesion of left T12 transverse process and T11 fracture. She was admitted for pain control and further work-up. Additional imaging (CT aortic survey, MRI thoracolumbar, bone scan) had evidence of widespread bony metastasis on thoracolumbar spine, proximal sacrum, bilateral ischium, ribs, and possibly L femur w/o clear evidence of primary source. She completed an outpatient bone bx 7/30 via neurosurgery.    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 The Ki-67 proliferation index is estimated to be 50-60% .  Echo shows preserved EF of 60 to 65%.  HIV hepatitis B negative.  FISH is pending for Bcl-2 and MYC rearrangement.  PET scan showsNumerous scattered hypermetabolic lesions throughout axial skeleton largest on most FDG avid is T11.  There is current extension into spinal canal and 2 neural foramina for T10/T11 and T11/T12.  There is a hypermetabolic lesion on the liver that might be related to lymphoma.   Numerous hypermetabolic lesions in appendicular skeleton including the left humeral head and right mid femur.  Hypermetabolic activity in the short segment narrowing of sigmoid colon.  This scan was discussed with radiologist.  Due to the fact that PET scan is with CT without contrast, reliable assessment of spinal nerve roots was not done and MRI was recommended.  Patient did not have any weakness or neuropathy or sciatica concerning for nerve root involvement.  Patient is quite symptomatic from her disease in terms of bone pains and require treatment in time sensitive manner.  Considering patient's age I do not think patient can tolerate concurrent M R-CHOP.  Majority of patient's disease burden is symptomatic and external compression to nerve root and dural involvement can also be treated by R-CHOP.  So I elected to proceed with R mini CHOP therapy.      She received 2 cycles, tolerated it well. PET scan after 2 cycles shows near CR with residual mild activity on T11- SUV 5.8 PET scan after 6 cycles of RminiCHOP (1.9/2025) showed persistent hypermetabolic activity at T11 with SUVmax 5.3 She underwent core needle biopsy of T11 lesion with IR (2/5/2025). It was negative for lymphoma.      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 No fevers, chills, night sweats or weight loss, no lymphadenopathy. No pain.    Labs: I personally reviewed complete blood count, differential, renal function test, liver function tests LDH.  No cytopenias, LFTs within normal limits, renal function test within normal limits and LDH is normal as well.    Assessment and Plan:    1) Diffuse large B cell lymphoma in CR after RminiCHOP:  -  I personally reviewed surgical pathology report and discussed with patient. She does not have any evidence of lymphoma and hypermetabolic activity is due to residual healing.I will see her back in 3 months with labs.  - Stop acyclovir and bactrim as lymphopenia is now resolved.     2) Autoimmune hepatitis:  - LFTs stable for now. She is off azathioprime. I discussed that she should follow up with hepatology. Risk and benefit of azathioprim resumption were discussed.     Total time spent on date of service in review of medical records, review of labs, history taking, physical exam, discussion of assessment and plan, counseling and patient education is 30 minutes.    Darya Yu MD  Attending Physician  Pager [phone]

## 2025-02-20 NOTE — PROGRESS NOTES
Is the patient currently in the state of [place]? YES    Visit mode: VIDEO    If the visit is dropped, the patient can be reconnected by:VIDEO VISIT: Send to e-mail at: [email or web address]    Will anyone else be joining the visit? NO  (If patient encounters technical issues they should call [phone]    Are changes needed to the allergy or medication list? No    Are refills needed on medications prescribed by this physician? NO    Rooming Documentation:  Questionnaire(s) completed    Reason for visit: Video Visit (Follow up )    Penelope PHILIP

## 2025-02-20 NOTE — LETTER
2/20/2025      Cara Cool  [address] [place] [address]      Dear Colleague,    Thank you for referring your patient, Cara Cool, to the [facility]. Please see a copy of my visit note below.      Is the patient currently in the state of [place]? YES    Visit mode: VIDEO    If the visit is dropped, the patient can be reconnected by:VIDEO VISIT: Send to e-mail at: [email or web address]    Will anyone else be joining the visit? NO  (If patient encounters technical issues they should call [phone]    Are changes needed to the allergy or medication list? No    Are refills needed on medications prescribed by this physician? NO    Rooming Documentation:  Questionnaire(s) completed    Reason for visit: Video Visit (Follow up )    Penelope Alvarado VVF       Virtual Visit Details    Type of service:  Video Visit   Video Start Time:  2:10 pm  Video End Time: 2:30 pm    Originating Location (pt. Location): Home    Distant Location (provider location):  On-site  Platform used for Video Visit: Altaf    CC: This is an 83 y/o female coming in for follow up of DLBCL.    HPI:    Oncology History Overview Note   This is an 83 y/o female with a PMH of pAF and saddle PE 2021 on Xarelto, HLD, HTN, TIA, OA, autoimmune hepatitis (on azathioprine), IgM/IgG kappa MGUS (dx 11/2017, follows with Dr. 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     Patient presented to the ED 7/17/2024 with severe and progressive mid-back pain x1 week without recent falls or injuries. Initial imaging revealed lytic lesion of left T12 transverse process and T11 fracture. She was admitted for pain control and further work-up. Additional imaging (CT aortic survey, MRI thoracolumbar, bone scan) had evidence of widespread bony metastasis on  thoracolumbar spine, proximal sacrum, bilateral ischium, ribs, and possibly L femur w/o clear evidence of primary source. She completed an outpatient bone bx 7/30 via neurosurgery.    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 The Ki-67 proliferation index is estimated to be 50-60% .  Echo shows preserved EF of 60 to 65%.  HIV hepatitis B negative.  FISH is pending for Bcl-2 and MYC rearrangement.  PET scan showsNumerous scattered hypermetabolic lesions throughout axial skeleton largest on most FDG avid is T11.  There is current extension into spinal canal and 2 neural foramina for T10/T11 and T11/T12.  There is a hypermetabolic lesion on the liver that might be related to lymphoma.  Numerous hypermetabolic lesions in appendicular skeleton including the left humeral head and right mid femur.  Hypermetabolic activity in the short segment narrowing of sigmoid colon.  This scan was discussed with radiologist.  Due to the fact that PET scan is with CT without contrast, reliable assessment of spinal nerve roots was not done and MRI was recommended.  Patient did not have any weakness or neuropathy or sciatica concerning for nerve root involvement.  Patient is quite symptomatic from her disease in terms of bone pains and require treatment in time sensitive manner.  Considering patient's age I do not think patient can tolerate concurrent M R-CHOP.  Majority of patient's disease burden is symptomatic and external compression to nerve root and dural involvement can also be treated by R-CHOP.  So I elected to proceed with R mini CHOP therapy.      She received 2 cycles, tolerated it well. PET scan after 2 cycles shows near CR with residual mild activity on T11- SUV 5.8 PET scan after 6 cycles of RminiCHOP (1.9/2025) showed persistent hypermetabolic activity at T11 with SUVmax 5.3 She underwent core needle  biopsy of T11 lesion with IR (2/5/2025). It was negative for lymphoma.      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 No fevers, chills, night sweats or weight loss, no lymphadenopathy. No pain.    Labs: I personally reviewed complete blood count, differential, renal function test, liver function tests LDH.  No cytopenias, LFTs within normal limits, renal function test within normal limits and LDH is normal as well.    Assessment and Plan:    1) Diffuse large B cell lymphoma in CR after RminiCHOP:  - I personally reviewed surgical pathology report and discussed with patient. She does not have any evidence of lymphoma and hypermetabolic activity is due to residual healing.I will see her back in 3 months with labs.  - Stop acyclovir and bactrim as lymphopenia is now resolved.     2) Autoimmune hepatitis:  - LFTs stable for now. She is off azathioprime. I discussed that she should follow up with hepatology. Risk and benefit of azathioprim resumption were discussed.     Total time spent on date of service in review of medical records, review of labs, history taking, physical exam, discussion of assessment and plan, counseling and patient education is 30 minutes.    Darya Yu MD  Attending Physician  Pager [phone]        Again, thank you for allowing me to participate in the care of your patient.        Sincerely,        Darya Yu MD    Electronically signed

## 2025-03-20 ENCOUNTER — PATIENT OUTREACH (OUTPATIENT)
Dept: ONCOLOGY | Facility: CLINIC | Age: 85
End: 2025-03-20
Payer: COMMERCIAL

## 2025-03-20 NOTE — PROGRESS NOTES
[facility]: Cancer Care Chart Review                                                                                        Situation: Patient chart reviewed by Oncology RN Care Coordinator.     Background: Pt seen by Dr. Yu on 2/20/25.     Assessment: No coordination needed at this time by RNCC. Status set as Maintenance for enrollment.     Plan/Recommendations: Follow-up with provider in 6 months.     MANUEL CrouchN, RN  RN Care Coordinator   [phone]

## 2025-03-25 ENCOUNTER — MYC MEDICAL ADVICE (OUTPATIENT)
Dept: ONCOLOGY | Facility: CLINIC | Age: 85
End: 2025-03-25
Payer: COMMERCIAL

## 2025-03-26 NOTE — CONFIDENTIAL NOTE
Oncology Nurse Triage - Reporting Symptoms    Situation:   Cara reporting the following symptoms: paain       Background:   Treating Provider:   Dr Yu     Date of last office visit: 2/20/25 DR uY     Recent treatments: No      Assessment  Onset of symptoms: pain in back feels like a severe gas pain.Pain is right above the hip and towards the spine on the left side. But not over the spine. Pain is at its worst when she first gets up.   Was taking tylenol but did not feel like it was helping so no longer taking it.   Is not using heat or cold to area.    Is no longer taking acyclovir or prednisone.   She was told that her cancer was gone and she is a bit leary when having pain that it could mean her cancer is back.       Since pain is worst in the AM advised to try tylenol right before she goes to bed. States she told DR Yu about the pain when she saw her, but the pain is lasting longer than she thought it would.     Recommendations:

## 2025-06-09 ENCOUNTER — ONCOLOGY VISIT (OUTPATIENT)
Dept: ONCOLOGY | Facility: CLINIC | Age: 85
End: 2025-06-09
Attending: STUDENT IN AN ORGANIZED HEALTH CARE EDUCATION/TRAINING PROGRAM
Payer: COMMERCIAL

## 2025-06-09 VITALS
SYSTOLIC BLOOD PRESSURE: 148 MMHG | OXYGEN SATURATION: 96 % | BODY MASS INDEX: 40.4 KG/M2 | DIASTOLIC BLOOD PRESSURE: 79 MMHG | TEMPERATURE: 97.8 F | HEART RATE: 72 BPM | WEIGHT: 257.94 LBS | RESPIRATION RATE: 16 BRPM

## 2025-06-09 DIAGNOSIS — C83.398 DIFFUSE LARGE B-CELL LYMPHOMA OF EXTRANODAL SITE (H): ICD-10-CM

## 2025-06-09 LAB
ALBUMIN SERPL BCG-MCNC: 3.9 G/DL (ref 3.5–5.2)
ALP SERPL-CCNC: 155 U/L (ref 40–150)
ALT SERPL W P-5'-P-CCNC: 15 U/L (ref 0–50)
ANION GAP SERPL CALCULATED.3IONS-SCNC: 9 MMOL/L (ref 7–15)
AST SERPL W P-5'-P-CCNC: 30 U/L (ref 0–45)
BASOPHILS # BLD AUTO: 0 10E3/UL (ref 0–0.2)
BASOPHILS NFR BLD AUTO: 0 %
BILIRUB SERPL-MCNC: 0.7 MG/DL
BUN SERPL-MCNC: 23.1 MG/DL (ref 8–23)
CALCIUM SERPL-MCNC: 9.3 MG/DL (ref 8.8–10.4)
CHLORIDE SERPL-SCNC: 110 MMOL/L (ref 98–107)
CREAT SERPL-MCNC: 0.77 MG/DL (ref 0.51–0.95)
EGFRCR SERPLBLD CKD-EPI 2021: 76 ML/MIN/1.73M2
EOSINOPHIL # BLD AUTO: 0.1 10E3/UL (ref 0–0.7)
EOSINOPHIL NFR BLD AUTO: 1 %
ERYTHROCYTE [DISTWIDTH] IN BLOOD BY AUTOMATED COUNT: 16.4 % (ref 10–15)
GLUCOSE SERPL-MCNC: 87 MG/DL (ref 70–99)
HCO3 SERPL-SCNC: 21 MMOL/L (ref 22–29)
HCT VFR BLD AUTO: 35.2 % (ref 35–47)
HGB BLD-MCNC: 10.7 G/DL (ref 11.7–15.7)
IMM GRANULOCYTES # BLD: 0 10E3/UL
IMM GRANULOCYTES NFR BLD: 0 %
LDH SERPL L TO P-CCNC: 214 U/L (ref 0–250)
LYMPHOCYTES # BLD AUTO: 1.6 10E3/UL (ref 0.8–5.3)
LYMPHOCYTES NFR BLD AUTO: 28 %
MCH RBC QN AUTO: 26.2 PG (ref 26.5–33)
MCHC RBC AUTO-ENTMCNC: 30.4 G/DL (ref 31.5–36.5)
MCV RBC AUTO: 86 FL (ref 78–100)
MONOCYTES # BLD AUTO: 0.7 10E3/UL (ref 0–1.3)
MONOCYTES NFR BLD AUTO: 13 %
NEUTROPHILS # BLD AUTO: 3.3 10E3/UL (ref 1.6–8.3)
NEUTROPHILS NFR BLD AUTO: 58 %
NRBC # BLD AUTO: 0 10E3/UL
NRBC BLD AUTO-RTO: 0 /100
PLATELET # BLD AUTO: 280 10E3/UL (ref 150–450)
POTASSIUM SERPL-SCNC: 5.2 MMOL/L (ref 3.4–5.3)
PROT SERPL-MCNC: 6.9 G/DL (ref 6.4–8.3)
RBC # BLD AUTO: 4.08 10E6/UL (ref 3.8–5.2)
SODIUM SERPL-SCNC: 140 MMOL/L (ref 135–145)
WBC # BLD AUTO: 5.6 10E3/UL (ref 4–11)

## 2025-06-09 PROCEDURE — 250N000011 HC RX IP 250 OP 636: Performed by: STUDENT IN AN ORGANIZED HEALTH CARE EDUCATION/TRAINING PROGRAM

## 2025-06-09 PROCEDURE — 85025 COMPLETE CBC W/AUTO DIFF WBC: CPT | Performed by: STUDENT IN AN ORGANIZED HEALTH CARE EDUCATION/TRAINING PROGRAM

## 2025-06-09 PROCEDURE — G0463 HOSPITAL OUTPT CLINIC VISIT: HCPCS | Performed by: STUDENT IN AN ORGANIZED HEALTH CARE EDUCATION/TRAINING PROGRAM

## 2025-06-09 PROCEDURE — 83615 LACTATE (LD) (LDH) ENZYME: CPT | Performed by: STUDENT IN AN ORGANIZED HEALTH CARE EDUCATION/TRAINING PROGRAM

## 2025-06-09 PROCEDURE — 84132 ASSAY OF SERUM POTASSIUM: CPT | Performed by: STUDENT IN AN ORGANIZED HEALTH CARE EDUCATION/TRAINING PROGRAM

## 2025-06-09 PROCEDURE — 36591 DRAW BLOOD OFF VENOUS DEVICE: CPT | Performed by: STUDENT IN AN ORGANIZED HEALTH CARE EDUCATION/TRAINING PROGRAM

## 2025-06-09 PROCEDURE — 99214 OFFICE O/P EST MOD 30 MIN: CPT | Performed by: STUDENT IN AN ORGANIZED HEALTH CARE EDUCATION/TRAINING PROGRAM

## 2025-06-09 RX ORDER — HEPARIN SODIUM (PORCINE) LOCK FLUSH IV SOLN 100 UNIT/ML 100 UNIT/ML
5 SOLUTION INTRAVENOUS ONCE
Status: COMPLETED | OUTPATIENT
Start: 2025-06-09 | End: 2025-06-09

## 2025-06-09 RX ADMIN — Medication 5 ML: at 14:04

## 2025-06-09 ASSESSMENT — PAIN SCALES - GENERAL: PAINLEVEL_OUTOF10: NO PAIN (0)

## 2025-06-09 NOTE — PROGRESS NOTES
Meenu Marroquin is a 85 year old, presenting for the following health issues:  Blood Draw (Port blood draw by lab RN) and Oncology Clinic Visit (Non Hodgkin's Lymphoma)    HPI      Oncology History Overview Note   This is an 85 y/o female with a PMH of pAF and saddle PE 2021 on Xarelto, HLD, HTN, TIA, OA, autoimmune hepatitis (on azathioprine), IgM/IgG kappa MGUS (dx 11/2017, follows with Dr. 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     Patient presented to the ED 7/17/2024 with severe and progressive mid-back pain x1 week without recent falls or injuries. Initial imaging revealed lytic lesion of left T12 transverse process and T11 fracture. She was admitted for pain control and further work-up. Additional imaging (CT aortic survey, MRI thoracolumbar, bone scan) had evidence of widespread bony metastasis on thoracolumbar spine, proximal sacrum, bilateral ischium, ribs, and possibly L femur w/o clear evidence of primary source. She completed an outpatient bone bx 7/30 via neurosurgery.    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 The Ki-67 proliferation index is estimated to be 50-60% .  Echo shows preserved EF of 60 to 65%.  HIV hepatitis B negative.  FISH is pending for Bcl-2 and MYC rearrangement.  PET scan showsNumerous scattered hypermetabolic lesions throughout axial skeleton largest on most FDG avid is T11.  There is current extension into spinal canal and 2 neural foramina for T10/T11 and T11/T12.  There is a hypermetabolic lesion on the liver that might be related to lymphoma.  Numerous hypermetabolic lesions in appendicular skeleton including the left humeral head and right mid femur.  Hypermetabolic activity  in the short segment narrowing of sigmoid colon.  This scan was discussed with radiologist.  Due to the fact that PET scan is with CT without contrast, reliable assessment of spinal nerve roots was not done and MRI was recommended.  Patient did not have any weakness or neuropathy or sciatica concerning for nerve root involvement.  Patient is quite symptomatic from her disease in terms of bone pains and require treatment in time sensitive manner.  Considering patient's age I do not think patient can tolerate concurrent M R-CHOP.  Majority of patient's disease burden is symptomatic and external compression to nerve root and dural involvement can also be treated by R-CHOP.  So I elected to proceed with R mini CHOP therapy.      She received 2 cycles, tolerated it well. PET scan after 2 cycles shows near CR with residual mild activity on T11- SUV 5.8 PET scan after 6 cycles of RminiCHOP (1.9/2025) showed persistent hypermetabolic activity at T11 with SUVmax 5.3 She underwent core needle biopsy of T11 lesion with IR (2/5/2025). It was negative for lymphoma.      Diffuse large B-cell lymphoma of extranodal site (H)   8/16/2024 Initial Diagnosis    Diffuse large B-cell lymphoma of extranodal site (H)     8/28/2024 -  Chemotherapy    OP ONC Non-Hodgkin's Lymphoma - R-CHOP  Plan Provider: Darya Yu MD  Treatment goal: Curative  Line of treatment: First Line       Patient comes today for follow up. No fevers, chills, night sweats or weight loss, no lymphadenopathy. Knee pain related to history of arthritis. Back pain consistently getting better.         Objective    BP (!) 148/79   Pulse 72   Temp 97.8  F (36.6  C) (Oral)   Resp 16   Wt 117 kg (257 lb 15 oz)   SpO2 96%   BMI 40.40 kg/m    Body mass index is 40.4 kg/m .  Physical Exam   GENERAL:  Alert oriented well nourished  HEAD: normocephalic atraumatic  SKIN:  no rash, hives, other lesions.  LYMPHATIC: no abnormal lymph nodes palable in cervical, axillary,  supraclavicular or inguinal area.  RESP:  No rales or rhonchi, breath sounds bilaterally equal and vesicular  CV:  No tachycardia, S1 S2 normal No murmur.  GI:  Abdomen soft nontender no hepato or splenomegaly  MUSCULOSKELETAL:  No visible joint redness or swelling.  NEURO:  No gross weakness gait normal  PSYCH: pleasant affect    Labs: I personally reviewed complete blood count, differential, renal function test, liver function tests LDH.  No critical cytopenias, LFTs within normal limits, renal function test within normal limits and LDH is normal as well. Mild anemia    Assessment and Plan:    1) Diffuse large B cell lymphoma:  - She does not have any evidence of lymphoma recurrence or progression.  - I will see her back in 3 months with labs and scans.    2) Back pain getting much better now.    3) Knee pain:  - Probably related to history of arthritis and prior surgeries. I advised to discuss with PCP as she may need ortho referral.    Total time spent on date of service in review of medical records, review of labs, history taking, physical exam, discussion of assessment and plan, counseling and patient education is 30 minutes.    Darya Yu MD  Attending Physician  Pager [phone]                Signed Electronically by: Darya Yu MD

## 2025-06-09 NOTE — LETTER
6/9/2025      Cara Cool  [address] [place] [address]      Dear Colleague,    Thank you for referring your patient, Cara Cool, to the [facility]CER [facility]. Please see a copy of my visit note below.    Subjective  Cara is a 85 year old, presenting for the following health issues:  Blood Draw (Port blood draw by lab RN) and Oncology Clinic Visit (Non Hodgkin's Lymphoma)    HPI      Oncology History Overview Note   This is an 85 y/o female with a PMH of pAF and saddle PE 2021 on Xarelto, HLD, HTN, TIA, OA, autoimmune hepatitis (on azathioprine), IgM/IgG kappa MGUS (dx 11/2017, follows with Dr. Partha Sherman at 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     Patient presented to the ED 7/17/2024 with severe and progressive mid-back pain x1 week without recent falls or injuries. Initial imaging revealed lytic lesion of left T12 transverse process and T11 fracture. She was admitted for pain control and further work-up. Additional imaging (CT aortic survey, MRI thoracolumbar, bone scan) had evidence of widespread bony metastasis on thoracolumbar spine, proximal sacrum, bilateral ischium, ribs, and possibly L femur w/o clear evidence of primary source. She completed an outpatient bone bx 7/30 via neurosurgery.    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 The Ki-67 proliferation index is estimated to be 50-60% .  Echo shows preserved EF of 60 to 65%.  HIV hepatitis B negative.  FISH is pending for Bcl-2 and MYC rearrangement.  PET scan showsNumerous scattered hypermetabolic lesions throughout axial skeleton largest on most FDG avid is T11.  There is current extension into spinal canal and 2 neural foramina  for T10/T11 and T11/T12.  There is a hypermetabolic lesion on the liver that might be related to lymphoma.  Numerous hypermetabolic lesions in appendicular skeleton including the left humeral head and right mid femur.  Hypermetabolic activity in the short segment narrowing of sigmoid colon.  This scan was discussed with radiologist.  Due to the fact that PET scan is with CT without contrast, reliable assessment of spinal nerve roots was not done and MRI was recommended.  Patient did not have any weakness or neuropathy or sciatica concerning for nerve root involvement.  Patient is quite symptomatic from her disease in terms of bone pains and require treatment in time sensitive manner.  Considering patient's age I do not think patient can tolerate concurrent M R-CHOP.  Majority of patient's disease burden is symptomatic and external compression to nerve root and dural involvement can also be treated by R-CHOP.  So I elected to proceed with R mini CHOP therapy.      She received 2 cycles, tolerated it well. PET scan after 2 cycles shows near CR with residual mild activity on T11- SUV 5.8 PET scan after 6 cycles of RminiCHOP (1.9/2025) showed persistent hypermetabolic activity at T11 with SUVmax 5.3 She underwent core needle biopsy of T11 lesion with IR (2/5/2025). It was negative for lymphoma.      Diffuse large B-cell lymphoma of extranodal site (H)   8/16/2024 Initial Diagnosis    Diffuse large B-cell lymphoma of extranodal site (H)     8/28/2024 -  Chemotherapy    OP ONC Non-Hodgkin's Lymphoma - R-CHOP  Plan Provider: Darya Yu MD  Treatment goal: Curative  Line of treatment: First Line       Patient comes today for follow up. No fevers, chills, night sweats or weight loss, no lymphadenopathy. Knee pain related to history of arthritis. Back pain consistently getting better.         Objective   BP (!) 148/79   Pulse 72   Temp 97.8  F (36.6  C) (Oral)   Resp 16   Wt 117 kg (257 lb 15 oz)   SpO2 96%   BMI  40.40 kg/m    Body mass index is 40.4 kg/m .  Physical Exam   GENERAL:  Alert oriented well nourished  HEAD: normocephalic atraumatic  SKIN:  no rash, hives, other lesions.  LYMPHATIC: no abnormal lymph nodes palable in cervical, axillary, supraclavicular or inguinal area.  RESP:  No rales or rhonchi, breath sounds bilaterally equal and vesicular  CV:  No tachycardia, S1 S2 normal No murmur.  GI:  Abdomen soft nontender no hepato or splenomegaly  MUSCULOSKELETAL:  No visible joint redness or swelling.  NEURO:  No gross weakness gait normal  PSYCH: pleasant affect    Labs: I personally reviewed complete blood count, differential, renal function test, liver function tests LDH.  No critical cytopenias, LFTs within normal limits, renal function test within normal limits and LDH is normal as well. Mild anemia    Assessment and Plan:    1) Diffuse large B cell lymphoma:  - She does not have any evidence of lymphoma recurrence or progression.  - I will see her back in 3 months with labs and scans.    2) Back pain getting much better now.    3) Knee pain:  - Probably related to history of arthritis and prior surgeries. I advised to discuss with PCP as she may need ortho referral.    Total time spent on date of service in review of medical records, review of labs, history taking, physical exam, discussion of assessment and plan, counseling and patient education is 30 minutes.    Darya Yu MD  Attending Physician  Pager [phone]                Signed Electronically by: Darya Yu MD      Again, thank you for allowing me to participate in the care of your patient.        Sincerely,        Darya Yu MD    Electronically signed

## 2025-06-09 NOTE — NURSING NOTE
Chief Complaint   Patient presents with    Blood Draw     Port blood draw by lab RN       Port accessed with blood draw and heparin flush by lab RN. Vitals taken and next appointment arrived.    Lizet Weeks RN

## 2025-06-09 NOTE — NURSING NOTE
"Oncology Rooming Note    June 9, 2025 2:09 PM   Cara Cool is a 85 year old female who presents for:    Chief Complaint   Patient presents with    Blood Draw     Port blood draw by lab RN    Oncology Clinic Visit     Non Hodgkin's Lymphoma     Initial Vitals: BP (!) 148/79   Pulse 72   Temp 97.8  F (36.6  C) (Oral)   Resp 16   Wt 117 kg (257 lb 15 oz)   SpO2 96%   BMI 40.40 kg/m   Estimated body mass index is 40.4 kg/m  as calculated from the following:    Height as of 2/20/25: 1.702 m (5' 7\").    Weight as of this encounter: 117 kg (257 lb 15 oz). Body surface area is 2.35 meters squared.  No Pain (0) Comment: Data Unavailable   No LMP recorded. Patient has had a hysterectomy.  Allergies reviewed: Yes  Medications reviewed: Yes    Medications: Medication refills not needed today.  Pharmacy name entered into [employer]:    [facility] - [place], [place] - [address] [place] [address] [id number] - [address] AT [address]    Frailty Screening:   Is the patient here for a new oncology consult visit in cancer care? 2. No    PHQ9:  Did this patient require a PHQ9?: No      Clinical concerns: Patient isn't sure if they need any refills or not.     Jameel Lou LPN            "

## 2025-07-21 ENCOUNTER — LAB (OUTPATIENT)
Dept: LAB | Facility: CLINIC | Age: 85
End: 2025-07-21
Attending: STUDENT IN AN ORGANIZED HEALTH CARE EDUCATION/TRAINING PROGRAM
Payer: COMMERCIAL

## 2025-07-21 DIAGNOSIS — C83.398 DIFFUSE LARGE B-CELL LYMPHOMA OF EXTRANODAL SITE (H): ICD-10-CM

## 2025-07-21 LAB
ALBUMIN SERPL BCG-MCNC: 4 G/DL (ref 3.5–5.2)
ALP SERPL-CCNC: 178 U/L (ref 40–150)
ALT SERPL W P-5'-P-CCNC: 19 U/L (ref 0–50)
ANION GAP SERPL CALCULATED.3IONS-SCNC: 11 MMOL/L (ref 7–15)
AST SERPL W P-5'-P-CCNC: 32 U/L (ref 0–45)
BASOPHILS # BLD AUTO: 0 10E3/UL (ref 0–0.2)
BASOPHILS NFR BLD AUTO: 0 %
BILIRUB SERPL-MCNC: 0.8 MG/DL
BUN SERPL-MCNC: 23.9 MG/DL (ref 8–23)
CALCIUM SERPL-MCNC: 9.5 MG/DL (ref 8.8–10.4)
CHLORIDE SERPL-SCNC: 109 MMOL/L (ref 98–107)
CREAT SERPL-MCNC: 0.8 MG/DL (ref 0.51–0.95)
EGFRCR SERPLBLD CKD-EPI 2021: 73 ML/MIN/1.73M2
EOSINOPHIL # BLD AUTO: 0.2 10E3/UL (ref 0–0.7)
EOSINOPHIL NFR BLD AUTO: 3 %
ERYTHROCYTE [DISTWIDTH] IN BLOOD BY AUTOMATED COUNT: 17 % (ref 10–15)
GLUCOSE SERPL-MCNC: 87 MG/DL (ref 70–99)
HCO3 SERPL-SCNC: 20 MMOL/L (ref 22–29)
HCT VFR BLD AUTO: 35.7 % (ref 35–47)
HGB BLD-MCNC: 10.9 G/DL (ref 11.7–15.7)
IMM GRANULOCYTES # BLD: 0 10E3/UL
IMM GRANULOCYTES NFR BLD: 0 %
LDH SERPL L TO P-CCNC: 218 U/L (ref 0–250)
LYMPHOCYTES # BLD AUTO: 1.8 10E3/UL (ref 0.8–5.3)
LYMPHOCYTES NFR BLD AUTO: 34 %
MCH RBC QN AUTO: 26.4 PG (ref 26.5–33)
MCHC RBC AUTO-ENTMCNC: 30.5 G/DL (ref 31.5–36.5)
MCV RBC AUTO: 86 FL (ref 78–100)
MONOCYTES # BLD AUTO: 0.6 10E3/UL (ref 0–1.3)
MONOCYTES NFR BLD AUTO: 12 %
NEUTROPHILS # BLD AUTO: 2.7 10E3/UL (ref 1.6–8.3)
NEUTROPHILS NFR BLD AUTO: 51 %
NRBC # BLD AUTO: 0 10E3/UL
NRBC BLD AUTO-RTO: 0 /100
PLATELET # BLD AUTO: 316 10E3/UL (ref 150–450)
POTASSIUM SERPL-SCNC: 4.8 MMOL/L (ref 3.4–5.3)
PROT SERPL-MCNC: 7.3 G/DL (ref 6.4–8.3)
RBC # BLD AUTO: 4.13 10E6/UL (ref 3.8–5.2)
SODIUM SERPL-SCNC: 140 MMOL/L (ref 135–145)
WBC # BLD AUTO: 5.3 10E3/UL (ref 4–11)

## 2025-07-21 PROCEDURE — 82374 ASSAY BLOOD CARBON DIOXIDE: CPT

## 2025-07-21 PROCEDURE — 85025 COMPLETE CBC W/AUTO DIFF WBC: CPT

## 2025-07-21 PROCEDURE — 83615 LACTATE (LD) (LDH) ENZYME: CPT

## 2025-07-21 PROCEDURE — 250N000011 HC RX IP 250 OP 636: Performed by: STUDENT IN AN ORGANIZED HEALTH CARE EDUCATION/TRAINING PROGRAM

## 2025-07-21 PROCEDURE — 36591 DRAW BLOOD OFF VENOUS DEVICE: CPT

## 2025-07-21 RX ORDER — HEPARIN SODIUM (PORCINE) LOCK FLUSH IV SOLN 100 UNIT/ML 100 UNIT/ML
5 SOLUTION INTRAVENOUS ONCE
Status: COMPLETED | OUTPATIENT
Start: 2025-07-21 | End: 2025-07-21

## 2025-07-21 RX ADMIN — Medication 5 ML: at 13:11

## 2025-07-21 NOTE — NURSING NOTE
Chief Complaint   Patient presents with    Port Draw     Labs collected from port by RN.        Port accessed with 20 gauge 3/4 inch flat needle by RN, labs collected, line flushed with saline and heparin.      Ledy Casillas RN

## 2025-08-05 ENCOUNTER — TELEPHONE (OUTPATIENT)
Dept: CARDIOLOGY | Facility: CLINIC | Age: 85
End: 2025-08-05
Payer: COMMERCIAL

## 2025-08-26 ENCOUNTER — LAB (OUTPATIENT)
Dept: LAB | Facility: CLINIC | Age: 85
End: 2025-08-26
Payer: COMMERCIAL

## 2025-08-26 VITALS
SYSTOLIC BLOOD PRESSURE: 131 MMHG | OXYGEN SATURATION: 99 % | DIASTOLIC BLOOD PRESSURE: 63 MMHG | HEART RATE: 62 BPM | RESPIRATION RATE: 12 BRPM | WEIGHT: 254.6 LBS | BODY MASS INDEX: 39.88 KG/M2

## 2025-08-26 DIAGNOSIS — D47.2 IGM MONOCLONAL GAMMOPATHY OF UNCERTAIN SIGNIFICANCE: Primary | ICD-10-CM

## 2025-08-26 DIAGNOSIS — C83.398 DIFFUSE LARGE B-CELL LYMPHOMA OF EXTRANODAL SITE (H): ICD-10-CM

## 2025-08-26 PROCEDURE — 250N000011 HC RX IP 250 OP 636: Performed by: STUDENT IN AN ORGANIZED HEALTH CARE EDUCATION/TRAINING PROGRAM

## 2025-08-26 RX ORDER — HEPARIN SODIUM (PORCINE) LOCK FLUSH IV SOLN 100 UNIT/ML 100 UNIT/ML
5 SOLUTION INTRAVENOUS ONCE
Status: COMPLETED | OUTPATIENT
Start: 2025-08-26 | End: 2025-08-26

## 2025-08-26 RX ADMIN — Medication 5 ML: at 12:08

## 2025-08-26 ASSESSMENT — PAIN SCALES - GENERAL: PAINLEVEL_OUTOF10: NO PAIN (0)

## 2025-09-04 ENCOUNTER — ANCILLARY PROCEDURE (OUTPATIENT)
Dept: CT IMAGING | Facility: CLINIC | Age: 85
End: 2025-09-04
Attending: STUDENT IN AN ORGANIZED HEALTH CARE EDUCATION/TRAINING PROGRAM
Payer: COMMERCIAL

## 2025-09-04 DIAGNOSIS — C83.398 DIFFUSE LARGE B-CELL LYMPHOMA OF EXTRANODAL SITE (H): ICD-10-CM

## 2025-09-04 LAB
CREAT BLD-MCNC: 0.9 MG/DL (ref 0.5–1)
EGFRCR SERPLBLD CKD-EPI 2021: >60 ML/MIN/1.73M2

## 2025-09-04 RX ORDER — HEPARIN SODIUM (PORCINE) LOCK FLUSH IV SOLN 100 UNIT/ML 100 UNIT/ML
500 SOLUTION INTRAVENOUS ONCE
Status: COMPLETED | OUTPATIENT
Start: 2025-09-04 | End: 2025-09-04

## 2025-09-04 RX ORDER — IOPAMIDOL 755 MG/ML
124 INJECTION, SOLUTION INTRAVASCULAR ONCE
Status: COMPLETED | OUTPATIENT
Start: 2025-09-04 | End: 2025-09-04

## 2025-09-04 RX ADMIN — IOPAMIDOL 124 ML: 755 INJECTION, SOLUTION INTRAVASCULAR at 11:29

## 2025-09-04 RX ADMIN — HEPARIN SODIUM (PORCINE) LOCK FLUSH IV SOLN 100 UNIT/ML 500 UNITS: 100 SOLUTION at 11:38

## (undated) RX ORDER — PENTAMIDINE ISETHIONATE 300 MG/300MG
INHALANT RESPIRATORY (INHALATION)
Status: DISPENSED
Start: 2025-01-13

## (undated) RX ORDER — ACETAMINOPHEN 325 MG/1
TABLET ORAL
Status: DISPENSED
Start: 2025-02-13

## (undated) RX ORDER — HEPARIN SODIUM (PORCINE) LOCK FLUSH IV SOLN 100 UNIT/ML 100 UNIT/ML
SOLUTION INTRAVENOUS
Status: DISPENSED
Start: 2025-09-04

## (undated) RX ORDER — PENTAMIDINE ISETHIONATE 300 MG/300MG
INHALANT RESPIRATORY (INHALATION)
Status: DISPENSED
Start: 2024-12-09

## (undated) RX ORDER — CEFAZOLIN SODIUM 2 G/100ML
INJECTION, SOLUTION INTRAVENOUS
Status: DISPENSED
Start: 2024-09-16

## (undated) RX ORDER — FENTANYL CITRATE 50 UG/ML
INJECTION, SOLUTION INTRAMUSCULAR; INTRAVENOUS
Status: DISPENSED
Start: 2024-07-30

## (undated) RX ORDER — FENTANYL CITRATE 50 UG/ML
INJECTION, SOLUTION INTRAMUSCULAR; INTRAVENOUS
Status: DISPENSED
Start: 2025-02-13

## (undated) RX ORDER — HEPARIN SODIUM (PORCINE) LOCK FLUSH IV SOLN 100 UNIT/ML 100 UNIT/ML
SOLUTION INTRAVENOUS
Status: DISPENSED
Start: 2025-02-13

## (undated) RX ORDER — ALBUTEROL SULFATE 0.83 MG/ML
SOLUTION RESPIRATORY (INHALATION)
Status: DISPENSED
Start: 2025-02-10

## (undated) RX ORDER — PENTAMIDINE ISETHIONATE 300 MG/300MG
INHALANT RESPIRATORY (INHALATION)
Status: DISPENSED
Start: 2024-10-07

## (undated) RX ORDER — ALBUTEROL SULFATE 0.83 MG/ML
SOLUTION RESPIRATORY (INHALATION)
Status: DISPENSED
Start: 2024-08-30

## (undated) RX ORDER — ALBUTEROL SULFATE 0.83 MG/ML
SOLUTION RESPIRATORY (INHALATION)
Status: DISPENSED
Start: 2025-01-13

## (undated) RX ORDER — PENTAMIDINE ISETHIONATE 300 MG/300MG
INHALANT RESPIRATORY (INHALATION)
Status: DISPENSED
Start: 2024-11-07

## (undated) RX ORDER — BUPIVACAINE HYDROCHLORIDE 5 MG/ML
INJECTION, SOLUTION EPIDURAL; INTRACAUDAL
Status: DISPENSED
Start: 2024-07-30

## (undated) RX ORDER — SODIUM CHLORIDE 9 MG/ML
INJECTION, SOLUTION INTRAVENOUS
Status: DISPENSED
Start: 2024-09-16

## (undated) RX ORDER — FENTANYL CITRATE 50 UG/ML
INJECTION, SOLUTION INTRAMUSCULAR; INTRAVENOUS
Status: DISPENSED
Start: 2024-09-16

## (undated) RX ORDER — LIDOCAINE HYDROCHLORIDE 10 MG/ML
INJECTION, SOLUTION EPIDURAL; INFILTRATION; INTRACAUDAL; PERINEURAL
Status: DISPENSED
Start: 2024-07-30

## (undated) RX ORDER — PENTAMIDINE ISETHIONATE 300 MG/300MG
INHALANT RESPIRATORY (INHALATION)
Status: DISPENSED
Start: 2024-08-30

## (undated) RX ORDER — LIDOCAINE HYDROCHLORIDE 10 MG/ML
INJECTION, SOLUTION EPIDURAL; INFILTRATION; INTRACAUDAL; PERINEURAL
Status: DISPENSED
Start: 2024-09-16

## (undated) RX ORDER — LIDOCAINE HYDROCHLORIDE 10 MG/ML
INJECTION, SOLUTION EPIDURAL; INFILTRATION; INTRACAUDAL; PERINEURAL
Status: DISPENSED
Start: 2025-02-13

## (undated) RX ORDER — PENTAMIDINE ISETHIONATE 300 MG/300MG
INHALANT RESPIRATORY (INHALATION)
Status: DISPENSED
Start: 2025-02-10

## (undated) RX ORDER — ALBUTEROL SULFATE 0.83 MG/ML
SOLUTION RESPIRATORY (INHALATION)
Status: DISPENSED
Start: 2024-10-07

## (undated) RX ORDER — HEPARIN SODIUM (PORCINE) LOCK FLUSH IV SOLN 100 UNIT/ML 100 UNIT/ML
SOLUTION INTRAVENOUS
Status: DISPENSED
Start: 2024-09-16

## (undated) RX ORDER — SODIUM CHLORIDE 9 MG/ML
INJECTION, SOLUTION INTRAVENOUS
Status: DISPENSED
Start: 2024-07-30

## (undated) RX ORDER — ALBUTEROL SULFATE 0.83 MG/ML
SOLUTION RESPIRATORY (INHALATION)
Status: DISPENSED
Start: 2024-12-09

## (undated) RX ORDER — ALBUTEROL SULFATE 0.83 MG/ML
SOLUTION RESPIRATORY (INHALATION)
Status: DISPENSED
Start: 2024-11-07